# Patient Record
Sex: MALE | Race: WHITE | Employment: UNEMPLOYED | URBAN - METROPOLITAN AREA
[De-identification: names, ages, dates, MRNs, and addresses within clinical notes are randomized per-mention and may not be internally consistent; named-entity substitution may affect disease eponyms.]

---

## 2017-02-07 ENCOUNTER — TELEPHONE (OUTPATIENT)
Dept: PAIN MANAGEMENT | Age: 32
End: 2017-02-07

## 2017-02-08 ENCOUNTER — TELEPHONE (OUTPATIENT)
Dept: PAIN MANAGEMENT | Age: 32
End: 2017-02-08

## 2017-02-10 ENCOUNTER — TELEPHONE (OUTPATIENT)
Dept: PAIN MANAGEMENT | Age: 32
End: 2017-02-10

## 2017-02-10 RX ORDER — METHADONE HYDROCHLORIDE 5 MG/1
5 TABLET ORAL EVERY 12 HOURS PRN
Qty: 30 TABLET | Refills: 0 | Status: SHIPPED | OUTPATIENT
Start: 2017-02-10 | End: 2017-02-14

## 2017-02-14 ENCOUNTER — HOSPITAL ENCOUNTER (OUTPATIENT)
Dept: PAIN MANAGEMENT | Age: 32
Discharge: HOME | End: 2017-02-14
Attending: PAIN MEDICINE | Admitting: PAIN MEDICINE
Payer: MEDICAID

## 2017-02-14 VITALS
HEART RATE: 104 BPM | SYSTOLIC BLOOD PRESSURE: 140 MMHG | RESPIRATION RATE: 16 BRPM | HEIGHT: 75 IN | OXYGEN SATURATION: 95 % | TEMPERATURE: 98 F | DIASTOLIC BLOOD PRESSURE: 87 MMHG | BODY MASS INDEX: 36.06 KG/M2 | WEIGHT: 290 LBS

## 2017-02-14 DIAGNOSIS — G43.919 INTRACTABLE MIGRAINE WITHOUT STATUS MIGRAINOSUS, UNSPECIFIED MIGRAINE TYPE: Primary | ICD-10-CM

## 2017-02-14 DIAGNOSIS — M54.2 CERVICALGIA: ICD-10-CM

## 2017-02-14 DIAGNOSIS — Q98.4 KLINEFELTER SYNDROME: ICD-10-CM

## 2017-02-14 DIAGNOSIS — Z79.891 ENCOUNTER FOR LONG-TERM OPIATE ANALGESIC USE: ICD-10-CM

## 2017-02-14 DIAGNOSIS — M47.812 CERVICAL SPONDYLOSIS WITHOUT MYELOPATHY: ICD-10-CM

## 2017-02-14 DIAGNOSIS — R51.9 BILATERAL HEADACHES: ICD-10-CM

## 2017-02-14 PROCEDURE — 99214 OFFICE O/P EST MOD 30 MIN: CPT | Performed by: PAIN MEDICINE

## 2017-02-14 PROCEDURE — 99213 OFFICE O/P EST LOW 20 MIN: CPT

## 2017-02-14 ASSESSMENT — PAIN DESCRIPTION - LOCATION: LOCATION: HEAD

## 2017-02-14 ASSESSMENT — ENCOUNTER SYMPTOMS
EYE WATERING: 0
NAUSEA: 0
GASTROINTESTINAL NEGATIVE: 1
BACK PAIN: 0
BLURRED VISION: 0
DOUBLE VISION: 1
EYE PAIN: 0
RESPIRATORY NEGATIVE: 1
VOMITING: 0

## 2017-02-14 ASSESSMENT — PAIN DESCRIPTION - PAIN TYPE: TYPE: CHRONIC PAIN

## 2017-02-14 ASSESSMENT — PAIN DESCRIPTION - ONSET: ONSET: ON-GOING

## 2017-02-14 ASSESSMENT — PAIN DESCRIPTION - ORIENTATION: ORIENTATION: RIGHT;LEFT

## 2017-02-14 ASSESSMENT — PAIN SCALES - GENERAL: PAINLEVEL_OUTOF10: 7

## 2017-02-14 ASSESSMENT — PAIN DESCRIPTION - PROGRESSION: CLINICAL_PROGRESSION: NOT CHANGED

## 2017-02-14 ASSESSMENT — PAIN DESCRIPTION - FREQUENCY: FREQUENCY: CONTINUOUS

## 2017-03-20 ENCOUNTER — HOSPITAL ENCOUNTER (OUTPATIENT)
Dept: PAIN MANAGEMENT | Age: 32
Discharge: HOME | End: 2017-03-20
Payer: MEDICAID

## 2017-03-20 VITALS
HEART RATE: 93 BPM | RESPIRATION RATE: 16 BRPM | SYSTOLIC BLOOD PRESSURE: 121 MMHG | DIASTOLIC BLOOD PRESSURE: 81 MMHG | BODY MASS INDEX: 36.06 KG/M2 | WEIGHT: 290 LBS | TEMPERATURE: 98 F | OXYGEN SATURATION: 95 % | HEIGHT: 75 IN

## 2017-03-20 DIAGNOSIS — R51.9 BILATERAL HEADACHES: ICD-10-CM

## 2017-03-20 DIAGNOSIS — Z79.891 ENCOUNTER FOR LONG-TERM OPIATE ANALGESIC USE: ICD-10-CM

## 2017-03-20 DIAGNOSIS — G43.919 INTRACTABLE MIGRAINE WITHOUT STATUS MIGRAINOSUS, UNSPECIFIED MIGRAINE TYPE: Primary | ICD-10-CM

## 2017-03-20 DIAGNOSIS — M47.812 CERVICAL SPONDYLOSIS WITHOUT MYELOPATHY: ICD-10-CM

## 2017-03-20 DIAGNOSIS — M54.2 CERVICALGIA: ICD-10-CM

## 2017-03-20 DIAGNOSIS — Q98.4 KLINEFELTER SYNDROME: ICD-10-CM

## 2017-03-20 DIAGNOSIS — R51.9 GENERALIZED HEADACHES: ICD-10-CM

## 2017-03-20 PROCEDURE — 99214 OFFICE O/P EST MOD 30 MIN: CPT | Performed by: PAIN MEDICINE

## 2017-03-20 PROCEDURE — 99213 OFFICE O/P EST LOW 20 MIN: CPT

## 2017-03-20 RX ORDER — BUPRENORPHINE 5 UG/H
1 PATCH TRANSDERMAL WEEKLY
Qty: 4 PATCH | Refills: 0 | Status: SHIPPED | OUTPATIENT
Start: 2017-03-20 | End: 2017-05-12 | Stop reason: SDUPTHER

## 2017-03-20 ASSESSMENT — ENCOUNTER SYMPTOMS
EYES NEGATIVE: 1
EYE WATERING: 0
BACK PAIN: 0
SHORTNESS OF BREATH: 0
VOMITING: 0
EYE PAIN: 0
BLURRED VISION: 0
ABDOMINAL PAIN: 0
GASTROINTESTINAL NEGATIVE: 1
COUGH: 0
RESPIRATORY NEGATIVE: 1
CONSTIPATION: 0
NAUSEA: 0

## 2017-03-20 ASSESSMENT — PAIN DESCRIPTION - ORIENTATION: ORIENTATION: RIGHT;LEFT

## 2017-03-20 ASSESSMENT — PAIN SCALES - GENERAL: PAINLEVEL_OUTOF10: 7

## 2017-03-20 ASSESSMENT — PAIN DESCRIPTION - PAIN TYPE: TYPE: CHRONIC PAIN

## 2017-03-20 ASSESSMENT — PAIN DESCRIPTION - PROGRESSION: CLINICAL_PROGRESSION: NOT CHANGED

## 2017-03-20 ASSESSMENT — PAIN DESCRIPTION - ONSET: ONSET: ON-GOING

## 2017-03-20 ASSESSMENT — PAIN DESCRIPTION - FREQUENCY: FREQUENCY: CONTINUOUS

## 2017-03-20 ASSESSMENT — PAIN DESCRIPTION - LOCATION: LOCATION: HEAD

## 2017-03-28 ENCOUNTER — TELEPHONE (OUTPATIENT)
Dept: PAIN MANAGEMENT | Age: 32
End: 2017-03-28

## 2017-03-29 ENCOUNTER — TELEPHONE (OUTPATIENT)
Dept: PAIN MANAGEMENT | Age: 32
End: 2017-03-29

## 2017-03-31 ENCOUNTER — TELEPHONE (OUTPATIENT)
Dept: PAIN MANAGEMENT | Age: 32
End: 2017-03-31

## 2017-05-12 ENCOUNTER — HOSPITAL ENCOUNTER (OUTPATIENT)
Dept: PAIN MANAGEMENT | Age: 32
Discharge: HOME | End: 2017-05-12
Payer: MEDICAID

## 2017-05-12 VITALS
RESPIRATION RATE: 16 BRPM | BODY MASS INDEX: 36.06 KG/M2 | HEART RATE: 108 BPM | OXYGEN SATURATION: 97 % | SYSTOLIC BLOOD PRESSURE: 116 MMHG | WEIGHT: 290 LBS | DIASTOLIC BLOOD PRESSURE: 85 MMHG | TEMPERATURE: 98 F | HEIGHT: 75 IN

## 2017-05-12 DIAGNOSIS — M54.2 CERVICALGIA: ICD-10-CM

## 2017-05-12 DIAGNOSIS — Z79.891 ENCOUNTER FOR LONG-TERM OPIATE ANALGESIC USE: ICD-10-CM

## 2017-05-12 DIAGNOSIS — R51.9 BILATERAL HEADACHES: ICD-10-CM

## 2017-05-12 DIAGNOSIS — G43.919 INTRACTABLE MIGRAINE WITHOUT STATUS MIGRAINOSUS, UNSPECIFIED MIGRAINE TYPE: Primary | ICD-10-CM

## 2017-05-12 DIAGNOSIS — R51.9 GENERALIZED HEADACHES: ICD-10-CM

## 2017-05-12 DIAGNOSIS — Q98.4 KLINEFELTER SYNDROME: ICD-10-CM

## 2017-05-12 DIAGNOSIS — M47.812 CERVICAL SPONDYLOSIS WITHOUT MYELOPATHY: ICD-10-CM

## 2017-05-12 PROCEDURE — 99214 OFFICE O/P EST MOD 30 MIN: CPT | Performed by: PAIN MEDICINE

## 2017-05-12 PROCEDURE — G0463 HOSPITAL OUTPT CLINIC VISIT: HCPCS

## 2017-05-12 PROCEDURE — 99213 OFFICE O/P EST LOW 20 MIN: CPT

## 2017-05-12 RX ORDER — BUPRENORPHINE 5 UG/H
1 PATCH TRANSDERMAL WEEKLY
Qty: 4 PATCH | Refills: 0 | Status: SHIPPED | OUTPATIENT
Start: 2017-06-13 | End: 2017-08-08 | Stop reason: SDUPTHER

## 2017-05-12 ASSESSMENT — PAIN DESCRIPTION - FREQUENCY: FREQUENCY: CONTINUOUS

## 2017-05-12 ASSESSMENT — ENCOUNTER SYMPTOMS
EYE PAIN: 0
BACK PAIN: 0
PHOTOPHOBIA: 1
EYE WATERING: 0
BLURRED VISION: 0
NAUSEA: 0
GASTROINTESTINAL NEGATIVE: 1
HEARTBURN: 0
VOMITING: 0
SHORTNESS OF BREATH: 1
CONSTIPATION: 0
COUGH: 1
ABDOMINAL PAIN: 0

## 2017-05-12 ASSESSMENT — PAIN SCALES - GENERAL: PAINLEVEL_OUTOF10: 5

## 2017-05-12 ASSESSMENT — PAIN DESCRIPTION - LOCATION: LOCATION: HEAD

## 2017-05-12 ASSESSMENT — PAIN DESCRIPTION - PROGRESSION: CLINICAL_PROGRESSION: NOT CHANGED

## 2017-05-12 ASSESSMENT — PAIN DESCRIPTION - DESCRIPTORS: DESCRIPTORS: HEADACHE

## 2017-05-12 ASSESSMENT — PAIN DESCRIPTION - PAIN TYPE: TYPE: CHRONIC PAIN

## 2017-05-12 ASSESSMENT — PAIN DESCRIPTION - ONSET: ONSET: ON-GOING

## 2017-05-12 ASSESSMENT — PAIN DESCRIPTION - ORIENTATION: ORIENTATION: RIGHT;LEFT

## 2017-05-23 ENCOUNTER — HOSPITAL ENCOUNTER (OUTPATIENT)
Dept: PAIN MANAGEMENT | Age: 32
Discharge: HOME | End: 2017-05-23
Payer: MEDICAID

## 2017-05-23 DIAGNOSIS — G43.919 INTRACTABLE MIGRAINE WITHOUT STATUS MIGRAINOSUS, UNSPECIFIED MIGRAINE TYPE: Primary | ICD-10-CM

## 2017-05-23 DIAGNOSIS — R51.9 BILATERAL HEADACHES: ICD-10-CM

## 2017-06-07 ENCOUNTER — HOSPITAL ENCOUNTER (OUTPATIENT)
Dept: PAIN MANAGEMENT | Age: 32
Discharge: HOME | End: 2017-06-07
Payer: MEDICAID

## 2017-06-07 DIAGNOSIS — R51.9 BILATERAL HEADACHES: ICD-10-CM

## 2017-06-07 DIAGNOSIS — R51.9 GENERALIZED HEADACHES: ICD-10-CM

## 2017-06-07 DIAGNOSIS — M54.2 CERVICALGIA: ICD-10-CM

## 2017-06-07 DIAGNOSIS — Z79.891 ENCOUNTER FOR LONG-TERM OPIATE ANALGESIC USE: ICD-10-CM

## 2017-06-07 DIAGNOSIS — G43.919 INTRACTABLE MIGRAINE WITHOUT STATUS MIGRAINOSUS, UNSPECIFIED MIGRAINE TYPE: Primary | ICD-10-CM

## 2017-06-07 PROCEDURE — G0463 HOSPITAL OUTPT CLINIC VISIT: HCPCS

## 2017-06-07 PROCEDURE — 99213 OFFICE O/P EST LOW 20 MIN: CPT

## 2017-06-07 PROCEDURE — 99213 OFFICE O/P EST LOW 20 MIN: CPT | Performed by: NURSE PRACTITIONER

## 2017-07-11 ENCOUNTER — HOSPITAL ENCOUNTER (OUTPATIENT)
Dept: PAIN MANAGEMENT | Age: 32
Discharge: HOME | End: 2017-07-11
Payer: MEDICAID

## 2017-07-11 VITALS
TEMPERATURE: 98.2 F | RESPIRATION RATE: 16 BRPM | WEIGHT: 290 LBS | SYSTOLIC BLOOD PRESSURE: 145 MMHG | BODY MASS INDEX: 36.06 KG/M2 | DIASTOLIC BLOOD PRESSURE: 94 MMHG | OXYGEN SATURATION: 96 % | HEART RATE: 102 BPM | HEIGHT: 75 IN

## 2017-07-11 DIAGNOSIS — M47.812 CERVICAL SPONDYLOSIS WITHOUT MYELOPATHY: Primary | ICD-10-CM

## 2017-07-11 DIAGNOSIS — Z79.891 ENCOUNTER FOR LONG-TERM OPIATE ANALGESIC USE: ICD-10-CM

## 2017-07-11 DIAGNOSIS — M54.2 CERVICALGIA: ICD-10-CM

## 2017-07-11 DIAGNOSIS — R51.9 GENERALIZED HEADACHES: ICD-10-CM

## 2017-07-11 DIAGNOSIS — R51.9 BILATERAL HEADACHES: ICD-10-CM

## 2017-07-11 DIAGNOSIS — G43.919 INTRACTABLE MIGRAINE WITHOUT STATUS MIGRAINOSUS, UNSPECIFIED MIGRAINE TYPE: ICD-10-CM

## 2017-07-11 PROCEDURE — 99213 OFFICE O/P EST LOW 20 MIN: CPT | Performed by: NURSE PRACTITIONER

## 2017-07-11 PROCEDURE — 99213 OFFICE O/P EST LOW 20 MIN: CPT

## 2017-07-11 ASSESSMENT — ENCOUNTER SYMPTOMS
GASTROINTESTINAL NEGATIVE: 1
EYES NEGATIVE: 1
SHORTNESS OF BREATH: 1

## 2017-08-08 ENCOUNTER — HOSPITAL ENCOUNTER (OUTPATIENT)
Dept: PAIN MANAGEMENT | Age: 32
Discharge: HOME | End: 2017-08-08
Payer: MEDICAID

## 2017-08-08 VITALS
HEIGHT: 75 IN | BODY MASS INDEX: 34.82 KG/M2 | SYSTOLIC BLOOD PRESSURE: 139 MMHG | HEART RATE: 94 BPM | WEIGHT: 280 LBS | RESPIRATION RATE: 16 BRPM | DIASTOLIC BLOOD PRESSURE: 84 MMHG

## 2017-08-08 DIAGNOSIS — Z79.891 ENCOUNTER FOR LONG-TERM OPIATE ANALGESIC USE: Primary | ICD-10-CM

## 2017-08-08 DIAGNOSIS — R51.9 GENERALIZED HEADACHES: ICD-10-CM

## 2017-08-08 DIAGNOSIS — G43.519 INTRACTABLE PERSISTENT MIGRAINE AURA WITHOUT CEREBRAL INFARCTION AND WITHOUT STATUS MIGRAINOSUS: ICD-10-CM

## 2017-08-08 PROCEDURE — 99213 OFFICE O/P EST LOW 20 MIN: CPT

## 2017-08-08 PROCEDURE — 80307 DRUG TEST PRSMV CHEM ANLYZR: CPT

## 2017-08-08 PROCEDURE — 99214 OFFICE O/P EST MOD 30 MIN: CPT | Performed by: NURSE PRACTITIONER

## 2017-08-08 RX ORDER — BUPRENORPHINE 5 UG/H
1 PATCH TRANSDERMAL WEEKLY
Qty: 4 PATCH | Refills: 0 | Status: SHIPPED | OUTPATIENT
Start: 2017-08-11 | End: 2017-10-10 | Stop reason: SDUPTHER

## 2017-08-08 ASSESSMENT — PAIN SCALES - GENERAL: PAINLEVEL_OUTOF10: 6

## 2017-08-08 ASSESSMENT — ENCOUNTER SYMPTOMS
BLURRED VISION: 0
SHORTNESS OF BREATH: 0
COUGH: 0
PHOTOPHOBIA: 1
CONSTIPATION: 0

## 2017-08-19 LAB
6-ACETYLMORPHINE, UR: NOT DETECTED
7-AMINOCLONAZEPAM, URINE: NOT DETECTED
ALPHA-OH-ALPRAZ, URINE: NOT DETECTED
ALPRAZOLAM, URINE: NOT DETECTED
AMPHETAMINES, URINE: NOT DETECTED
BARBITURATES, URINE: NOT DETECTED
BENZOYLECGONINE, UR: NOT DETECTED
BUPRENORPHINE URINE: NOT DETECTED
CARISOPRODOL, UR: NOT DETECTED
CLONAZEPAM, URINE: NOT DETECTED
CODEINE, URINE: NOT DETECTED
CREATININE URINE: 190.1 MG/DL (ref 20–400)
DIAZEPAM, URINE: NOT DETECTED
DRUGS EXPECTED, UR: NORMAL
EER HI RES INTERP UR: NORMAL
ETHYL GLUCURONIDE UR: NOT DETECTED
FENTANYL URINE: NOT DETECTED
HYDROCODONE, URINE: NOT DETECTED
HYDROMORPHONE, URINE: NOT DETECTED
LORAZEPAM, URINE: NOT DETECTED
MARIJUANA METAB, UR: NOT DETECTED
MDA, UR: NOT DETECTED
MDEA, EVE, UR: NOT DETECTED
MDMA URINE: NOT DETECTED
MEPERIDINE METAB, UR: NOT DETECTED
METHADONE, URINE: NOT DETECTED
METHAMPHETAMINE, URINE: NOT DETECTED
METHYLPHENIDATE: NOT DETECTED
MIDAZOLAM, URINE: NOT DETECTED
MORPHINE URINE: NOT DETECTED
NORBUPRENORPHINE, URINE: NOT DETECTED
NORDIAZEPAM, URINE: NOT DETECTED
NORFENTANYL, URINE: NOT DETECTED
NORHYDROCODONE, URINE: NOT DETECTED
NOROXYCODONE, URINE: NOT DETECTED
NOROXYMORPHONE, URINE: NOT DETECTED
OXAZEPAM, URINE: NOT DETECTED
OXYCODONE URINE: NOT DETECTED
OXYMORPHONE, URINE: NOT DETECTED
PAIN MANAGEMENT DRUG PANEL INTERP, URINE: NORMAL
PAIN MGT DRUG PANEL, HI RES, UR: NORMAL
PCP,URINE: NOT DETECTED
PHENTERMINE, UR: NOT DETECTED
PROPOXYPHENE, URINE: NOT DETECTED
TAPENTADOL, URINE: PRESENT
TAPENTADOL-O-SULFATE, URINE: PRESENT
TEMAZEPAM, URINE: NOT DETECTED
TRAMADOL, URINE: NOT DETECTED
ZOLPIDEM, URINE: NOT DETECTED

## 2017-09-11 ENCOUNTER — TELEPHONE (OUTPATIENT)
Dept: PAIN MANAGEMENT | Age: 32
End: 2017-09-11

## 2017-09-12 ENCOUNTER — HOSPITAL ENCOUNTER (OUTPATIENT)
Dept: PAIN MANAGEMENT | Age: 32
Discharge: HOME | End: 2017-09-12
Payer: MEDICAID

## 2017-09-12 VITALS — SYSTOLIC BLOOD PRESSURE: 140 MMHG | RESPIRATION RATE: 16 BRPM | DIASTOLIC BLOOD PRESSURE: 85 MMHG | HEART RATE: 95 BPM

## 2017-09-12 DIAGNOSIS — Z79.891 ENCOUNTER FOR LONG-TERM OPIATE ANALGESIC USE: ICD-10-CM

## 2017-09-12 DIAGNOSIS — G43.519 INTRACTABLE PERSISTENT MIGRAINE AURA WITHOUT CEREBRAL INFARCTION AND WITHOUT STATUS MIGRAINOSUS: Primary | ICD-10-CM

## 2017-09-12 PROCEDURE — 99213 OFFICE O/P EST LOW 20 MIN: CPT | Performed by: NURSE PRACTITIONER

## 2017-09-12 PROCEDURE — 99213 OFFICE O/P EST LOW 20 MIN: CPT

## 2017-09-12 ASSESSMENT — ENCOUNTER SYMPTOMS
NAUSEA: 0
SHORTNESS OF BREATH: 0
COUGH: 0
PHOTOPHOBIA: 1
CONSTIPATION: 0
BACK PAIN: 1
BLURRED VISION: 0

## 2017-10-09 ENCOUNTER — HOSPITAL ENCOUNTER (OUTPATIENT)
Dept: PAIN MANAGEMENT | Age: 32
Discharge: HOME | End: 2017-10-09
Payer: MEDICAID

## 2017-10-09 VITALS
OXYGEN SATURATION: 98 % | HEIGHT: 75 IN | TEMPERATURE: 98.4 F | DIASTOLIC BLOOD PRESSURE: 77 MMHG | HEART RATE: 96 BPM | WEIGHT: 280 LBS | BODY MASS INDEX: 34.82 KG/M2 | SYSTOLIC BLOOD PRESSURE: 137 MMHG | RESPIRATION RATE: 18 BRPM

## 2017-10-09 DIAGNOSIS — Z79.891 ENCOUNTER FOR LONG-TERM OPIATE ANALGESIC USE: ICD-10-CM

## 2017-10-09 DIAGNOSIS — R51.9 GENERALIZED HEADACHES: ICD-10-CM

## 2017-10-09 DIAGNOSIS — G43.919 INTRACTABLE MIGRAINE WITHOUT STATUS MIGRAINOSUS, UNSPECIFIED MIGRAINE TYPE: ICD-10-CM

## 2017-10-09 DIAGNOSIS — Q98.4 KLINEFELTER SYNDROME: ICD-10-CM

## 2017-10-09 DIAGNOSIS — R51.9 BILATERAL HEADACHES: ICD-10-CM

## 2017-10-09 DIAGNOSIS — G43.519 INTRACTABLE PERSISTENT MIGRAINE AURA WITHOUT CEREBRAL INFARCTION AND WITHOUT STATUS MIGRAINOSUS: Primary | ICD-10-CM

## 2017-10-09 DIAGNOSIS — M54.2 CERVICALGIA: ICD-10-CM

## 2017-10-09 DIAGNOSIS — M47.812 CERVICAL SPONDYLOSIS WITHOUT MYELOPATHY: ICD-10-CM

## 2017-10-09 PROCEDURE — 99213 OFFICE O/P EST LOW 20 MIN: CPT

## 2017-10-09 PROCEDURE — 99214 OFFICE O/P EST MOD 30 MIN: CPT | Performed by: PAIN MEDICINE

## 2017-10-09 RX ORDER — BUPRENORPHINE 10 UG/H
PATCH, EXTENDED RELEASE TRANSDERMAL
Refills: 0 | COMMUNITY
Start: 2017-08-11 | End: 2017-10-09 | Stop reason: SDUPTHER

## 2017-10-09 RX ORDER — TESTOSTERONE CYPIONATE 200 MG/ML
INJECTION INTRAMUSCULAR
Refills: 5 | COMMUNITY
Start: 2017-09-21 | End: 2018-11-28 | Stop reason: ALTCHOICE

## 2017-10-09 RX ORDER — BUPRENORPHINE 10 UG/H
PATCH, EXTENDED RELEASE TRANSDERMAL
Qty: 1 PATCH | Refills: 0 | Status: SHIPPED | OUTPATIENT
Start: 2017-10-09 | End: 2017-10-09 | Stop reason: SDUPTHER

## 2017-10-09 RX ORDER — BUPRENORPHINE 10 UG/H
PATCH, EXTENDED RELEASE TRANSDERMAL
Qty: 4 PATCH | Refills: 0 | Status: SHIPPED | OUTPATIENT
Start: 2017-10-09 | End: 2017-10-10 | Stop reason: CLARIF

## 2017-10-09 RX ORDER — SERTRALINE HYDROCHLORIDE 100 MG/1
TABLET, FILM COATED ORAL
Refills: 5 | COMMUNITY
Start: 2017-09-12 | End: 2019-09-13

## 2017-10-09 ASSESSMENT — PAIN SCALES - GENERAL: PAINLEVEL_OUTOF10: 6

## 2017-10-09 ASSESSMENT — PAIN DESCRIPTION - ONSET: ONSET: ON-GOING

## 2017-10-09 ASSESSMENT — PAIN DESCRIPTION - LOCATION: LOCATION: HEAD

## 2017-10-09 ASSESSMENT — PAIN DESCRIPTION - DESCRIPTORS: DESCRIPTORS: HEADACHE

## 2017-10-09 ASSESSMENT — ENCOUNTER SYMPTOMS
COUGH: 1
EYE PAIN: 0
SHORTNESS OF BREATH: 1
PHOTOPHOBIA: 1
VOMITING: 0
BACK PAIN: 0
BLURRED VISION: 0
NAUSEA: 0
EYE WATERING: 0
ABDOMINAL PAIN: 0

## 2017-10-09 ASSESSMENT — PAIN DESCRIPTION - PAIN TYPE: TYPE: CHRONIC PAIN

## 2017-10-09 ASSESSMENT — PAIN DESCRIPTION - ORIENTATION: ORIENTATION: RIGHT

## 2017-10-09 ASSESSMENT — PAIN DESCRIPTION - PROGRESSION: CLINICAL_PROGRESSION: NOT CHANGED

## 2017-10-09 ASSESSMENT — PAIN DESCRIPTION - FREQUENCY: FREQUENCY: CONTINUOUS

## 2017-10-09 NOTE — PROGRESS NOTES
tried triptans, oral narcotics, acetaminophen, antidepressants, NSAIDs, darkened room, cold packs, Excedrin and beta blockers for the symptoms. The treatment provided mild relief. His past medical history is significant for migraine headaches and migraines in the family. There is no history of recent head traumas. Patient relates current medications are helping the pain. Patient reports taking pain medications as prescribed, denies obtaining medications from different sources and denies use of illegal drugs. Patient denies side effects from medications like nausea, vomiting, constipation or drowsiness. Patient reports current activities of daily living ar possible due to medications and would like to continue them. OARRS compliant? yes  Concern for prescription abuse?no    Current Pain Assessment  Pain Assessment  Pain Assessment: 0-10  Pain Level: 6  Pain Type: Chronic pain  Pain Location: Head  Pain Orientation: Right  Pain Radiating Towards: none  Pain Descriptors: Headache  Pain Frequency: Continuous  Pain Onset: On-going  Clinical Progression: Not changed  Effect of Pain on Daily Activities: unable to get out of bed most days till pain from headache subsided  Patient's Stated Pain Goal: 2 (decrease pain and increase activty)  Pain Intervention(s): Medication (see eMar), Cold applied                    ADVERSE MEDICATION EFFECTS:   Constipation: no  Bowel Regimen: no  Diet: common adult  Appetite:  ok  Sedation:  Yes, Butrans does    Urinary Retention: no     FOCUSED PAIN SCALE:  Highest : 10  Lowest :2  Average: Range-depending on headache  When and What  was your last procedure:  Did have injections in the past by a different pain clinic   Was your procedure effective:  did not help     ACTIVITY/SOCIAL/EMOTIONAL:  Sleep Pattern: 8 hours per night.  generally restful sleep  Energy Level:  Tired/Fatigued  Currently attending Physical Therapy:  No  Home Exercises: tries to walk  Mobility: no current mobility problem   Currently seeing a Psychiatrist or Psychologist:  No  Emotional Issues: denies                      Mood: depressed , on zoloft, denies suicidal thoughts     ABERRANT BEHAVIORS SINCE LAST VISIT:  Have you ever been treated in another Pain Clinic no  Refills for prescriptions appropriate: yes  Lost rx/pills: yes, keeps them safe now  Taking more medication than prescribed:  no  Are you receiving PAIN medications from  other doctors: no  Last Urine/Serum Drug Screen : 8/2017DRUGS EXPECTED:   BUTRANS (BUPRENORPHINE) [7/27/17]   NUCYNTA (TAPENTADOL) [8/8/17 AM]   ________________________________________________________________   CONSISTENT with medications and timing provided:   NUCYNTA (TAPENTADOL) : based on tapentadol, tapentadol-o-sulfate   BUTRANS (BUPRENORPHINE) : based on the absence of buprenorphine   and metabolites   Was Serum/UDS as anticipated? yes  Brought pill bottles in :yes, no butrans since he did not get any last month  Was Pill count appropriate? :yes   Are currently pregnant? not applicable  Recent ER visits: No           Past Medical History      Diagnosis Date    Asthma     Depression     Headache     Klinefelter syndrome        Surgical History  Past Surgical History:   Procedure Laterality Date    UPPP UVULOPALATOPHARYGOPLASTY         Medications  Current Outpatient Prescriptions   Medication Sig Dispense Refill    [START ON 10/12/2017] tapentadol (NUCYNTA) 50 MG TABS Take 1 tablet by mouth every 8 hours .  Earliest Fill Date: 10/12/17 90 tablet 0    BUTRANS 10 MCG/HR PTWK APPLY ONE PATCH TO A HAIRLESS AREA EVERY WEEK 1 patch 0    sertraline (ZOLOFT) 100 MG tablet TAKE ONE TABLET BY MOUTH ONCE A DAY EVERY MORNING  5    testosterone cypionate (DEPOTESTOTERONE CYPIONATE) 200 MG/ML injection INJECT 1CC  INTRA-MUSCULARLY EVERY 14 DAYS  5    Albuterol (VENTOLIN IN) Inhale into the lungs      baclofen (LIORESAL) 10 MG tablet Take 10 mg by mouth 3 times daily      Constitutional: He is oriented to person, place, and time. He appears well-developed and well-nourished. Has sunglasses on due to photophobia   HENT:   Head: Normocephalic and atraumatic. Palpation did not reveal any tenderness over the temporal arteries are over the frontal sinuses. There   Eyes: Conjunctivae and EOM are normal. Pupils are equal, round, and reactive to light. Neck: Normal range of motion. Neck supple. No JVD present. No tracheal deviation present. No thyromegaly present. Cardiovascular: Normal rate and regular rhythm. Pulmonary/Chest: Breath sounds normal. No apnea. No respiratory distress. Abdominal: He exhibits no distension. Musculoskeletal: Normal range of motion. He exhibits no edema or tenderness. Neurological: He is alert and oriented to person, place, and time. He has normal strength and normal reflexes. He displays no atrophy and no tremor. No cranial nerve deficit. He exhibits normal muscle tone. He displays a negative Romberg sign. Coordination normal.   Reflex Scores:       Tricep reflexes are 2+ on the right side and 2+ on the left side. Bicep reflexes are 2+ on the right side and 2+ on the left side. Skin: Skin is warm, dry and intact. No rash noted. No erythema. Psychiatric: He has a normal mood and affect. His speech is normal and behavior is normal. Judgment and thought content normal. Cognition and memory are normal.   Nursing note and vitals reviewed. Back Exam     Tenderness   The patient is experiencing no tenderness. Range of Motion   Extension: normal   Flexion: normal   Lateral Bend Right: normal   Lateral Bend Left: normal   Rotation Right: normal   Rotation Left: normal     Comments:  Palpation did not reveal tenderness over the paraspinal region in the cervical area. There is a slight decrease in the cervical lordosis. Moments of the neck are painless and normal in range.             DATA  Labs:    8/19/2017 12:34 AM - Bull, [place] Incoming Lab Results From [facility]     Component Results     Component Value Ref Range & Units Status Collected Lab   Pain Management Drug Panel Interp, Urine Consistent   Final 08/08/2017  1:30 PM [facility]   (NOTE)   ________________________________________________________________   DRUGS EXPECTED:   BUTRANS (BUPRENORPHINE) [7/27/17]   NUCYNTA (TAPENTADOL) [8/8/17 AM]   ________________________________________________________________   CONSISTENT with medications and timing provided:   NUCYNTA (TAPENTADOL) : based on tapentadol, tapentadol-o-sulfate   BUTRANS (BUPRENORPHINE) : based on the absence of buprenorphine   and metabolites   ________________________________________________________________   INTERPRETIVE INFORMATION:Pain Mgt Abel, High Res/EMIT, Ur, Interp   Interpretation depends on accuracy and completeness of patient        Imaging:  Radiology Images and Reports reviewed where indicated and necessary  X-ray of the cervical spine:  Findings:  There is normal alignment without significant alignment shift upon flexion.  There is no acute fracture or subluxation.  The dens is intact. Oblique views demonstrate the neural foramen to be widely patent bilaterally       The vertebral bodies demonstrate normal height.  The intervertebral disc spaces are well maintained.  There is no prevertebral soft tissue swelling.       Impression: Unremarkable cervical spine radiographs.       Final report electronically signed by Mann Coker M.D. on 9/21/2016       ASSESSMENT    Rossy Cardona is a 36 y.o. male with     1. Intractable persistent migraine aura without cerebral infarction and without status migrainosus    2. Encounter for long-term opiate analgesic use    3. Generalized headaches    4. Intractable migraine without status migrainosus, unspecified migraine type    5. Cervicalgia    6. Bilateral headaches    7. Cervical spondylosis without myelopathy    8.  Klinefelter syndrome          Patient Active Problem List Diagnosis    Intractable persistent migraine aura without cerebral infarction and without status migrainosus    Bilateral headaches    Encounter for long-term opiate analgesic use    Cervicalgia    Cervical spondylosis without myelopathy       PLAN    We will continue current pain medications  Current medications are being tolerated without any Adverse side effects. Orders Placed This Encounter   Medications    tapentadol (NUCYNTA) 50 MG TABS     Sig: Take 1 tablet by mouth every 8 hours . Earliest Fill Date: 10/12/17     Dispense:  90 tablet     Refill:  0    BUTRANS 10 MCG/HR PTWK     Sig: APPLY ONE PATCH TO A HAIRLESS AREA EVERY WEEK     Dispense:  1 patch     Refill:  0     Urine drug screens have been appropriate. No aberrant activity noted. Analgesia is achieved. Activities of daily living are possible because of medications. Safe use of medications explained to patient. Counselling/Preventive measures for pain  Control:    [x]  Spine strengthening exercises are discussed with patient in detail. [x] Ill effects of being on chronic pain medications such as sleep disturbances, hormonal changes, withdrawal symptoms,  chronic opioid dependence and tolerance were discussed with patient. I had asked the patient to minimize medication use and utilize pain medications only for uncontrolled rest pain or pain with exertional activities. I advised patient not to self escalate pain medications without consulting with us. At each of patient's future visits we will try to taper pain medications, while adjusting the adjunct medications, and re-evaluating for Physical Therapy to improve spinal and joint strength. We will continue to have discussions to decrease pain medications as tolerated. We discussed the same at today's visit and have not been to implement it, as the patient's pain is not under control with current medications.      Decision Making Process : Patient's health history and

## 2017-10-10 ENCOUNTER — TELEPHONE (OUTPATIENT)
Dept: PAIN MANAGEMENT | Age: 32
End: 2017-10-10

## 2017-10-10 RX ORDER — BUPRENORPHINE 5 UG/H
1 PATCH TRANSDERMAL WEEKLY
Qty: 4 PATCH | Refills: 0 | Status: SHIPPED | OUTPATIENT
Start: 2017-10-10 | End: 2017-11-13 | Stop reason: SDUPTHER

## 2017-11-13 ENCOUNTER — HOSPITAL ENCOUNTER (OUTPATIENT)
Dept: PAIN MANAGEMENT | Age: 32
Discharge: HOME | End: 2017-11-13
Payer: MEDICAID

## 2017-11-13 VITALS
SYSTOLIC BLOOD PRESSURE: 128 MMHG | OXYGEN SATURATION: 96 % | RESPIRATION RATE: 16 BRPM | WEIGHT: 280 LBS | BODY MASS INDEX: 34.82 KG/M2 | TEMPERATURE: 98.3 F | HEART RATE: 74 BPM | DIASTOLIC BLOOD PRESSURE: 67 MMHG | HEIGHT: 75 IN

## 2017-11-13 DIAGNOSIS — Z79.891 ENCOUNTER FOR LONG-TERM OPIATE ANALGESIC USE: ICD-10-CM

## 2017-11-13 DIAGNOSIS — G43.919 INTRACTABLE MIGRAINE WITHOUT STATUS MIGRAINOSUS, UNSPECIFIED MIGRAINE TYPE: ICD-10-CM

## 2017-11-13 DIAGNOSIS — R51.9 BILATERAL HEADACHES: ICD-10-CM

## 2017-11-13 DIAGNOSIS — G43.519 INTRACTABLE PERSISTENT MIGRAINE AURA WITHOUT CEREBRAL INFARCTION AND WITHOUT STATUS MIGRAINOSUS: Primary | ICD-10-CM

## 2017-11-13 DIAGNOSIS — M54.2 CERVICALGIA: ICD-10-CM

## 2017-11-13 DIAGNOSIS — R51.9 GENERALIZED HEADACHES: ICD-10-CM

## 2017-11-13 PROCEDURE — 99213 OFFICE O/P EST LOW 20 MIN: CPT

## 2017-11-13 PROCEDURE — 99213 OFFICE O/P EST LOW 20 MIN: CPT | Performed by: NURSE PRACTITIONER

## 2017-11-13 RX ORDER — BUPRENORPHINE 5 UG/H
1 PATCH TRANSDERMAL WEEKLY
Qty: 4 PATCH | Refills: 0 | Status: SHIPPED | OUTPATIENT
Start: 2017-11-21 | End: 2018-01-15 | Stop reason: SDUPTHER

## 2017-11-13 ASSESSMENT — ENCOUNTER SYMPTOMS
GASTROINTESTINAL NEGATIVE: 1
PHOTOPHOBIA: 1
SHORTNESS OF BREATH: 1

## 2017-11-13 NOTE — PROGRESS NOTES
[facility]  Progress  Note    Patient is here today to review medication contract. Chief Complaint: headache    PMH      HPI: Patient to return to the pain clinic with a chief complaint of headaches. The headache involves entire head patient's headache is constant. He is cystoscopy chronic migraine headaches. The headaches are associated with photophobia as well as phonophobia. Patient apparently has been to headache [place] in [facility]. Patient also gives history of occipital nerve blocks without much improvement in the pain. Patient currently is on Butrans as well as Nucynta for control of headaches. They're helping a certain extent. Patient uses Butrans patch 15 days on and 15 days off in order to decrease the chance of developing tolerance patient reports this regimen is helping to control some of his symptoms and he is more functional. Patient denies any change in his symptoms. No Ed visits. Sleep is good. Activity the same. Pain a \"6\" today, left sides. Migraine    This is a chronic problem. The current episode started more than 1 year ago. The problem occurs constantly. The problem has been unchanged. The pain does not radiate. The pain quality is similar to prior headaches. The quality of the pain is described as throbbing. The pain is at a severity of 6/10. Associated symptoms include photophobia. The symptoms are aggravated by bright light. He has tried darkened room and cold packs for the symptoms. The treatment provided mild relief. His past medical history is significant for migraine headaches.      Possible side effects, risk of tolerance and or dependence and alternative treatments discussed    Obtaining appropriate analgesic effect of treatment   No signs of potential drug abuse or diversion identified    Treatment goals:  Functional status:  Get rid of headaches       Aberrancy  None   Analgesia pain  Pain 6  Adverse  Effects :  none  ADL;s :some walking        Patient denies any new neurological symptoms. No bowel or bladder incontinence, no weakness, and no falling. Controlled Substances Monitoring:     Attestation: The Prescription Monitoring Report for this patient was reviewed today. KASSANDRA Linares)  Documentation: Possible medication side effects, risk of tolerance and/or dependence, and alternative treatments discussed., Obtaining appropriate analgesic effect of treatment., No signs of potential drug abuse or diversion identified., Existing medication contract. (Linh Sweeney, KASSANDRA)  Pill count: appropriate 1 butrans patch left, to apply tomorrow    Review of OARRS does not show any aberrant prescription behavior. Medication is helping the patient stay active. Patient denies any side effects and reports adequate analgesia. No sign of misuse/abuse. Past Medical History:   Diagnosis Date    Asthma     Depression     Headache(784.0)     Klinefelter syndrome        Past Surgical History:   Procedure Laterality Date    UPPP UVULOPALATOPHARYGOPLASTY         Allergies   Allergen Reactions    Food      Bananas, eggs, milk, cherries    Seasonal      Hay fever           Current Outpatient Prescriptions:     tapentadol (NUCYNTA) 50 MG TABS, Take 1 tablet by mouth every 8 hours . , Disp: 90 tablet, Rfl: 0    [START ON 11/21/2017] buprenorphine (BUTRANS) 5 MCG/HR PTWK, Place 1 patch onto the skin once a week ., Disp: 4 patch, Rfl: 0    sertraline (ZOLOFT) 100 MG tablet, TAKE ONE TABLET BY MOUTH ONCE A DAY EVERY MORNING, Disp: , Rfl: 5    testosterone cypionate (DEPOTESTOTERONE CYPIONATE) 200 MG/ML injection, INJECT 1CC  INTRA-MUSCULARLY EVERY 14 DAYS, Disp: , Rfl: 5    anastrozole (ARIMIDEX) 1 MG tablet, Take 1 mg by mouth daily. , Disp: , Rfl:     Albuterol (VENTOLIN IN), Inhale into the lungs, Disp: , Rfl:     baclofen (LIORESAL) 10 MG tablet, Take 10 mg by mouth 3 times daily, Disp: , Rfl:     cyclobenzaprine (FLEXERIL) 10 MG tablet, , Disp: , Rfl:     Family History   Problem Relation Age of Onset    High Blood Pressure Father     Mental Illness Sister     High Blood Pressure Paternal Grandmother     Cancer Paternal Grandmother     Heart Disease Paternal Grandfather     High Blood Pressure Paternal Grandfather     Stroke Paternal Grandfather        Social History     Social History    Marital status: Single     Spouse name: N/A    Number of children: N/A    Years of education: N/A     Occupational History    disability      Social History Main Topics    Smoking status: Never Smoker    Smokeless tobacco: Never Used    Alcohol use Yes      Comment: rare , hard cider or wine    Drug use: No    Sexual activity: No     Other Topics Concern    Not on file     Social History Narrative    No narrative on file       Review of Systems:  Review of Systems   Constitution: Negative. HENT: Negative. Eyes: Positive for photophobia. Cardiovascular: Negative. Respiratory: Positive for shortness of breath. Skin: Negative. Musculoskeletal: Negative. Gastrointestinal: Negative. Neurological: Negative. Psychiatric/Behavioral:        MANAGING         Physical Exam:  /67   Pulse 74   Temp 98.3 °F (36.8 °C) (Oral)   Resp 16   Ht 6' 3\" (1.905 m)   Wt 280 lb (127 kg)   SpO2 96%   BMI 35.00 kg/m²     Physical Exam   Constitutional: He is oriented to person, place, and time and well-developed, well-nourished, and in no distress. obese   HENT:   Head: Normocephalic. No tenderness on palpation   Eyes: EOM are normal. Pupils are equal, round, and reactive to light. Neck: Normal range of motion. Neck supple. Neurological: He is alert and oriented to person, place, and time. He has normal strength. Gait normal.   Reflex Scores:       Tricep reflexes are 2+ on the right side and 2+ on the left side. Bicep reflexes are 2+ on the right side and 2+ on the left side.        Brachioradialis reflexes are 2+ on the clinical use. The results are not intended to be used as the sole means for   clinical diagnosis or patient management decisions. EER Hi Res Interp Ur See Note   Final 08/08/2017  1:30 PM [facility]   (NOTE)   Access [facility] Enhanced Report using either link below:   -Direct access: [email or web address]   -Enter Username, Password: [email or web address]   Username: [email or web address]   Password: [email or web address]   Performed by [place] [id number], [address] [facility] [phone]   www. Aleja Ltitlejohn MD, Lab. Director   Performed at [facility]: MARIA ELENA WALSH [address] [place] [address] [phone]    Testing Performed By       Assessment:  Problem List Items Addressed This Visit     Intractable persistent migraine aura without cerebral infarction and without status migrainosus - Primary    Relevant Medications    tapentadol (NUCYNTA) 50 MG TABS    buprenorphine (BUTRANS) 5 MCG/HR PTWK (Start on 11/21/2017)    Bilateral headaches    Relevant Medications    tapentadol (NUCYNTA) 50 MG TABS    buprenorphine (BUTRANS) 5 MCG/HR PTWK (Start on 11/21/2017)    Encounter for long-term opiate analgesic use    Cervicalgia      Other Visit Diagnoses     Generalized headaches        Relevant Medications    tapentadol (NUCYNTA) 50 MG TABS    buprenorphine (BUTRANS) 5 MCG/HR PTWK (Start on 11/21/2017)    Intractable migraine without status migrainosus, unspecified migraine type        Relevant Medications    tapentadol (NUCYNTA) 50 MG TABS    buprenorphine (BUTRANS) 5 MCG/HR PTWK (Start on 11/21/2017)            Treatment Plan:  DISCUSSION: Treatment options discussed with patient and all questions answered to patient's satisfaction.     Patient\"s mother brought letter from [employer] , patient needs prior authorization for [employer], will give to  and ask her to call pt;s mother  Once approved or if not approved  TREATMENT OPTIONS:   Return in 4 weeks  Continue opioid

## 2017-12-12 ENCOUNTER — HOSPITAL ENCOUNTER (OUTPATIENT)
Dept: PAIN MANAGEMENT | Age: 32
Discharge: HOME | End: 2017-12-12
Payer: MEDICAID

## 2017-12-12 VITALS
OXYGEN SATURATION: 98 % | HEART RATE: 90 BPM | WEIGHT: 280 LBS | RESPIRATION RATE: 16 BRPM | BODY MASS INDEX: 34.82 KG/M2 | HEIGHT: 75 IN | SYSTOLIC BLOOD PRESSURE: 116 MMHG | DIASTOLIC BLOOD PRESSURE: 73 MMHG

## 2017-12-12 DIAGNOSIS — Z79.891 ENCOUNTER FOR LONG-TERM OPIATE ANALGESIC USE: ICD-10-CM

## 2017-12-12 DIAGNOSIS — M54.2 CERVICALGIA: ICD-10-CM

## 2017-12-12 DIAGNOSIS — G43.519 INTRACTABLE PERSISTENT MIGRAINE AURA WITHOUT CEREBRAL INFARCTION AND WITHOUT STATUS MIGRAINOSUS: Primary | ICD-10-CM

## 2017-12-12 DIAGNOSIS — G43.919 INTRACTABLE MIGRAINE WITHOUT STATUS MIGRAINOSUS, UNSPECIFIED MIGRAINE TYPE: ICD-10-CM

## 2017-12-12 DIAGNOSIS — M47.812 CERVICAL SPONDYLOSIS WITHOUT MYELOPATHY: ICD-10-CM

## 2017-12-12 DIAGNOSIS — R51.9 BILATERAL HEADACHES: ICD-10-CM

## 2017-12-12 PROCEDURE — 99213 OFFICE O/P EST LOW 20 MIN: CPT

## 2017-12-12 PROCEDURE — 99213 OFFICE O/P EST LOW 20 MIN: CPT | Performed by: NURSE PRACTITIONER

## 2017-12-12 ASSESSMENT — ENCOUNTER SYMPTOMS
RESPIRATORY NEGATIVE: 1
EYES NEGATIVE: 1
GASTROINTESTINAL NEGATIVE: 1

## 2017-12-12 NOTE — PROGRESS NOTES
KASSANDRA Whitt)  Review of OARRS does not show any aberrant prescription behavior. Medication is helping the patient stay active. Patient denies any side effects and reports adequate analgesia. No sign of misuse/abuse. Past Medical History:   Diagnosis Date    Asthma     Depression     Headache(784.0)     Klinefelter syndrome        Past Surgical History:   Procedure Laterality Date    UPPP UVULOPALATOPHARYGOPLASTY         Allergies   Allergen Reactions    Food      Bananas, eggs, milk, cherries    Seasonal      Hay fever           Current Outpatient Prescriptions:     [START ON 12/13/2017] tapentadol (NUCYNTA) 50 MG TABS, Take 1 tablet by mouth every 8 hours . Earliest Fill Date: 12/13/17, Disp: 90 tablet, Rfl: 0    buprenorphine (BUTRANS) 5 MCG/HR PTWK, Place 1 patch onto the skin once a week ., Disp: 4 patch, Rfl: 0    sertraline (ZOLOFT) 100 MG tablet, TAKE ONE TABLET BY MOUTH ONCE A DAY EVERY MORNING, Disp: , Rfl: 5    testosterone cypionate (DEPOTESTOTERONE CYPIONATE) 200 MG/ML injection, INJECT 1CC  INTRA-MUSCULARLY EVERY 14 DAYS, Disp: , Rfl: 5    anastrozole (ARIMIDEX) 1 MG tablet, Take 1 mg by mouth daily. , Disp: , Rfl:     Albuterol (VENTOLIN IN), Inhale into the lungs, Disp: , Rfl:     baclofen (LIORESAL) 10 MG tablet, Take 10 mg by mouth 3 times daily, Disp: , Rfl:     cyclobenzaprine (FLEXERIL) 10 MG tablet, , Disp: , Rfl:     Family History   Problem Relation Age of Onset    High Blood Pressure Father     Mental Illness Sister     High Blood Pressure Paternal Grandmother     Cancer Paternal Grandmother     Heart Disease Paternal Grandfather     High Blood Pressure Paternal Grandfather     Stroke Paternal Grandfather        Social History     Social History    Marital status: Single     Spouse name: N/A    Number of children: N/A    Years of education: N/A     Occupational History    disability      Social History Main Topics    Smoking status: Never Smoker    Smokeless tobacco: Never Used    Alcohol use Yes      Comment: rare , hard cider or wine    Drug use: No    Sexual activity: No     Other Topics Concern    Not on file     Social History Narrative    No narrative on file       Review of Systems:  Review of Systems   Constitution: Negative. HENT: Negative. Eyes: Negative. Cardiovascular: Negative. Respiratory: Negative. Skin: Negative. Musculoskeletal: Negative. Gastrointestinal: Negative. Genitourinary: Negative. Neurological: Positive for headaches. Psychiatric/Behavioral: Positive for depression. Managing         Physical Exam:  /73   Pulse 90   Resp 16   Ht 6' 3\" (1.905 m)   Wt 280 lb (127 kg)   SpO2 98%   BMI 35.00 kg/m²     Physical Exam   Constitutional: He is oriented to person, place, and time and well-developed, well-nourished, and in no distress. HENT:   Head: Normocephalic. Eyes: Pupils are equal, round, and reactive to light. Neck: Normal range of motion. Neck supple. Pulmonary/Chest: Effort normal.   Musculoskeletal:        Cervical back: Normal.   Neurological: He is alert and oriented to person, place, and time. He has normal strength. Gait normal.   Reflex Scores:       Tricep reflexes are 2+ on the right side and 2+ on the left side. Bicep reflexes are 2+ on the right side and 2+ on the left side. Brachioradialis reflexes are 2+ on the right side and 2+ on the left side. Skin: Skin is warm, dry and intact.    Psychiatric: Affect and judgment normal.       Record/Diagnostics Review:    As above, I did review the imaging    Ass8/19/2017 12:34 AM - Bull, pn Incoming Lab Results From [employer] [facility]     Component Results     Component Value Ref Range & Units Status Collected Lab   Pain Management Drug Panel Interp, Urine Consistent   [facility] 08[facility]08/2017  1[facility]30 PM [facility]   ([facility]   ________________________________________________________________   DRUGS EXPECTED:   BUTRANS (BUPRENORPHINE) [7/27/17] Res, Ur See Below   Final 08/08/2017  1:30 PM [facility]   (NOTE)   Methodology: Qualitative Enzyme Immunoassay and Qualitative Liquid   Chromatography-Time of Flight-Mass Spectrometry, Quantitative   Spectrophotometry   The absence of expected drug(s) and/or drug metabolite(s) may   indicate non-compliance, inappropriate timing of specimen   collection relative to drug administration, poor drug absorption,   diluted/adulterated urine, or limitations of testing. The   concentration must be greater than or equal to the cutoff to be   reported as present.  If specific drug concentrations are   required, contact the laboratory within two weeks of specimen   collection to request quantification by a second analytical   technique. Interpretive questions should be directed to the   laboratory. Results based on immunoassay detection that do not match clinical   expectations should be   interpreted with caution. Confirmatory testing by mass   spectrometry for immunoassay-based results is available, if   ordered within two weeks of specimen collection. Additional   charges apply. For medical purposes only; not valid for forensic use. This test was developed and its performance characteristics   determined by Darin Walter. The U.S. Food and Drug   Administration has not approved or cleared this test; however, FDA   clearance or approval is not currently required for clinical use. The results are not intended to be used as the sole means for   clinical diagnosis or patient management decisions. EER Hi Res Interp Ur See Note   Final 08/08/2017  1:30 PM [facility]   (NOTE)   Access [facility] Enhanced Report using either link below:   -Direct access: [email or web address]   -Enter Username, Password: [email or web address]   Username: [email or web address]   Password: [email or web address]   Performed by Lexi Amador , [address] [facility] [phone]   www. Alina Otoole MD, Lab.  Director

## 2018-01-15 ENCOUNTER — HOSPITAL ENCOUNTER (OUTPATIENT)
Dept: PAIN MANAGEMENT | Age: 33
Discharge: HOME | End: 2018-01-15
Payer: MEDICAID

## 2018-01-15 VITALS
HEART RATE: 87 BPM | SYSTOLIC BLOOD PRESSURE: 112 MMHG | BODY MASS INDEX: 36.06 KG/M2 | RESPIRATION RATE: 16 BRPM | OXYGEN SATURATION: 96 % | WEIGHT: 290 LBS | HEIGHT: 75 IN | TEMPERATURE: 97.9 F | DIASTOLIC BLOOD PRESSURE: 78 MMHG

## 2018-01-15 DIAGNOSIS — M47.812 CERVICAL SPONDYLOSIS WITHOUT MYELOPATHY: ICD-10-CM

## 2018-01-15 DIAGNOSIS — M54.2 CERVICALGIA: ICD-10-CM

## 2018-01-15 DIAGNOSIS — Z79.891 ENCOUNTER FOR LONG-TERM OPIATE ANALGESIC USE: ICD-10-CM

## 2018-01-15 DIAGNOSIS — R51.9 GENERALIZED HEADACHES: ICD-10-CM

## 2018-01-15 DIAGNOSIS — Q98.4 KLINEFELTER SYNDROME: ICD-10-CM

## 2018-01-15 DIAGNOSIS — G43.919 INTRACTABLE MIGRAINE WITHOUT STATUS MIGRAINOSUS, UNSPECIFIED MIGRAINE TYPE: ICD-10-CM

## 2018-01-15 DIAGNOSIS — R51.9 BILATERAL HEADACHES: ICD-10-CM

## 2018-01-15 DIAGNOSIS — G43.519 INTRACTABLE PERSISTENT MIGRAINE AURA WITHOUT CEREBRAL INFARCTION AND WITHOUT STATUS MIGRAINOSUS: Primary | ICD-10-CM

## 2018-01-15 PROCEDURE — 99214 OFFICE O/P EST MOD 30 MIN: CPT | Performed by: PAIN MEDICINE

## 2018-01-15 RX ORDER — BUPRENORPHINE 5 UG/H
1 PATCH TRANSDERMAL WEEKLY
Qty: 4 PATCH | Refills: 0 | Status: SHIPPED | OUTPATIENT
Start: 2018-01-22 | End: 2018-04-09 | Stop reason: SDUPTHER

## 2018-01-15 ASSESSMENT — ENCOUNTER SYMPTOMS
VOMITING: 0
EYE WATERING: 0
EYES NEGATIVE: 1
EYE PAIN: 0
GASTROINTESTINAL NEGATIVE: 1
BLURRED VISION: 0
BACK PAIN: 0
ABDOMINAL PAIN: 0
NAUSEA: 0
RESPIRATORY NEGATIVE: 1

## 2018-01-15 ASSESSMENT — PAIN SCALES - GENERAL: PAINLEVEL_OUTOF10: 7

## 2018-01-15 ASSESSMENT — PAIN DESCRIPTION - FREQUENCY: FREQUENCY: CONTINUOUS

## 2018-01-15 ASSESSMENT — PAIN DESCRIPTION - DESCRIPTORS: DESCRIPTORS: HEADACHE

## 2018-01-15 ASSESSMENT — PAIN DESCRIPTION - PAIN TYPE: TYPE: CHRONIC PAIN

## 2018-01-15 ASSESSMENT — PAIN DESCRIPTION - PROGRESSION: CLINICAL_PROGRESSION: NOT CHANGED

## 2018-01-15 ASSESSMENT — PAIN DESCRIPTION - LOCATION: LOCATION: HEAD

## 2018-01-25 ENCOUNTER — TELEPHONE (OUTPATIENT)
Dept: PAIN MANAGEMENT | Age: 33
End: 2018-01-25

## 2018-01-30 ENCOUNTER — TELEPHONE (OUTPATIENT)
Dept: PAIN MANAGEMENT | Age: 33
End: 2018-01-30

## 2018-02-07 ENCOUNTER — TELEPHONE (OUTPATIENT)
Dept: PAIN MANAGEMENT | Age: 33
End: 2018-02-07

## 2018-02-08 ENCOUNTER — TELEPHONE (OUTPATIENT)
Dept: PAIN MANAGEMENT | Age: 33
End: 2018-02-08

## 2018-02-14 ENCOUNTER — HOSPITAL ENCOUNTER (OUTPATIENT)
Dept: PAIN MANAGEMENT | Age: 33
Discharge: HOME | End: 2018-02-14
Payer: MEDICAID

## 2018-02-14 VITALS
DIASTOLIC BLOOD PRESSURE: 92 MMHG | RESPIRATION RATE: 16 BRPM | BODY MASS INDEX: 36.68 KG/M2 | OXYGEN SATURATION: 97 % | HEIGHT: 75 IN | TEMPERATURE: 97.8 F | HEART RATE: 105 BPM | SYSTOLIC BLOOD PRESSURE: 130 MMHG | WEIGHT: 295 LBS

## 2018-02-14 DIAGNOSIS — G43.519 INTRACTABLE PERSISTENT MIGRAINE AURA WITHOUT CEREBRAL INFARCTION AND WITHOUT STATUS MIGRAINOSUS: ICD-10-CM

## 2018-02-14 DIAGNOSIS — R51.9 BILATERAL HEADACHES: ICD-10-CM

## 2018-02-14 DIAGNOSIS — G43.919 INTRACTABLE MIGRAINE WITHOUT STATUS MIGRAINOSUS, UNSPECIFIED MIGRAINE TYPE: ICD-10-CM

## 2018-02-14 DIAGNOSIS — Q98.4 KLINEFELTER SYNDROME: ICD-10-CM

## 2018-02-14 DIAGNOSIS — M54.2 CERVICALGIA: ICD-10-CM

## 2018-02-14 DIAGNOSIS — Z79.891 ENCOUNTER FOR LONG-TERM OPIATE ANALGESIC USE: ICD-10-CM

## 2018-02-14 DIAGNOSIS — M47.812 CERVICAL SPONDYLOSIS WITHOUT MYELOPATHY: ICD-10-CM

## 2018-02-14 DIAGNOSIS — R51.9 GENERALIZED HEADACHES: ICD-10-CM

## 2018-02-14 PROCEDURE — 99213 OFFICE O/P EST LOW 20 MIN: CPT

## 2018-02-14 PROCEDURE — 99213 OFFICE O/P EST LOW 20 MIN: CPT | Performed by: NURSE PRACTITIONER

## 2018-02-14 ASSESSMENT — ENCOUNTER SYMPTOMS
NAUSEA: 1
PHOTOPHOBIA: 1
RESPIRATORY NEGATIVE: 1

## 2018-02-14 NOTE — PROGRESS NOTES
Rfl:     Family History   Problem Relation Age of Onset    High Blood Pressure Father     Mental Illness Sister     High Blood Pressure Paternal Grandmother     Cancer Paternal Grandmother     Heart Disease Paternal Grandfather     High Blood Pressure Paternal Grandfather     Stroke Paternal Grandfather        Social History     Social History    Marital status: Single     Spouse name: N/A    Number of children: N/A    Years of education: N/A     Occupational History    disability      Social History Main Topics    Smoking status: Never Smoker    Smokeless tobacco: Never Used    Alcohol use Yes      Comment: rare , hard cider or wine    Drug use: No    Sexual activity: No     Other Topics Concern    Not on file     Social History Narrative    No narrative on file       Review of Systems:  Review of Systems   Constitution: Negative. Eyes: Positive for photophobia. Respiratory: Negative. Skin: Negative. Musculoskeletal: Negative. Gastrointestinal: Positive for nausea. Genitourinary: Negative. Neurological: Positive for vertigo. Psychiatric/Behavioral: Positive for depression. Physical Exam:  BP (!) 130/92   Pulse 105   Temp 97.8 °F (36.6 °C) (Oral)   Resp 16   Ht 6' 3\" (1.905 m)   Wt 295 lb (133.8 kg)   SpO2 97%   BMI 36.87 kg/m²     Physical Exam  Constitutional: He is oriented to person, place, and time and well-developed, well-nourished, and in no distress. Sunglasses on due to photophobia  HENT:   Head: Normocephalic. Eyes: Pupils are equal, round, and reactive to light. Neck: Normal range of motion. Neck supple. Pulmonary/Chest: Effort normal.   Musculoskeletal:        Cervical back: Normal.   Neurological: He is alert and oriented to person, place, and time. He has normal strength. Gait normal.   Reflex Scores:       Tricep reflexes are 2+ on the right side and 2+ on the left side.        Bicep reflexes are 2+ on the right side and 2+ on the left side.       Brachioradialis reflexes are 2+ on the right side and 2+ on the left side. Skin: Skin is warm, dry and intact. Psychiatric: Affect and judgment normal.     Record/Diagnostics Review:    As ab8/19/2017 12:34 AM - Bull, pn Incoming Lab Results From [facility]     Component Results     Component Value Ref Range & Units Status Collected Lab   Pain Management Drug Panel Interp, Urine Consistent   Final 08/08/2017  1:30 PM [facility]   (NOTE)   ________________________________________________________________   DRUGS EXPECTED:   BUTRANS (BUPRENORPHINE) [7/27/17]   NUCYNTA (TAPENTADOL) [8/8/17 AM]   ________________________________________________________________   CONSISTENT with medications and timing provided:   NUCYNTA (TAPENTADOL) : based on tapentadol, tapentadol-o-sulfate   BUTRANS (BUPRENORPHINE) : based on the absence of buprenorphine   and metabolites   ________________________________________________________________   INTERPRETIVE INFORMATION:Pain Mgt Abel, High Res/EMIT, Ur, Interp   Interpretation depends on accuracy and completeness of patient   medication information submitted by client. 6-Acetylmorphine, Ur Not Detected   Final 08/08/2017  1:30 PM [facility]   7-Aminoclonazepam, Urine Not Detected   Final 08/08/2017  1:30 PM [facility]   Alpha-OH-Alpraz, Urine Not Detected   Final 08/08/2017  1:30 PM [facility]   Alprazolam, Urine Not Detected   Final 08/08/2017  1:30 PM [facility]   Amphetamines, urine Not Detected   Final 08/08/2017  1:30 PM [facility]   Barbiturates, Ur Not Detected   Final 08/08/2017  1:30 PM [facility]   Benzoylecgonine, Ur Not Detected   Final 08/08/2017  1:30 PM [facility]   Buprenorphine Urine Not Detected   Final 08/08/2017  1:30 PM [facility]   Carisoprodol, Ur Not Detected   Final 08/08/2017  1:30 PM [facility]   (NOTE)   The carisoprodol immunoassay has cross-reactivity to carisoprodol   and meprobamate.     Clonazepam, Urine Not Detected   Final 08/08/2017  1:30 PM [facility]   Codeine, Urine Not Detected   Final 08/08/2017  1:30 PM [facility]   MDA, Ur Not Detected   Final 08/08/2017  1:30 PM [facility]   Diazepam, Urine Not Detected   Final 08/08/2017  1:30 PM [facility]   Ethyl Glucuronide Ur Not Detected   Final 08/08/2017  1:30 PM [facility]   Fentanyl, Ur Not Detected   Final 08/08/2017  1:30 PM [facility]   Hydrocodone, Urine Not Detected   Final 08/08/2017  1:30 PM [facility]   Hydromorphone, Urine Not Detected   Final 08/08/2017  1:30 PM [facility]   Lorazepam, Urine Not Detected   Final 08/08/2017  1:30 PM [facility]   Marijuana Metab, Ur Not Detected   Final 08/08/2017  1:30 PM [facility]   MDEA, CINDY, Ur Not Detected   Final 08/08/2017  1:30 PM [facility]   MDMA URINE Not Detected   Final 08/08/2017  1:30 PM [facility]   Meperidine Metab, Ur Not Detected   Final 08/08/2017  1:30 PM [facility]   Methadone, Urine Not Detected   Final 08/08/2017  1:30 PM [facility]   Methamphetamine, Urine Not Detected   Final 08/08/2017  1:30 PM [facility]   Methylphenidate Not Detected   Final 08/08/2017  1:30 PM [facility]   Midazolam, Urine Not Detected   Final 08/08/2017  1:30 PM [facility]   Morphine Urine Not Detected   Final 08/08/2017  1:30 PM [facility]   Norbuprenorphine, Urine Not Detected   Final 08/08/2017  1:30 PM [facility]   Nordiazepam, Urine Not Detected   Final 08/08/2017  1:30 PM [facility]   Norfentanyl, Urine Not Detected   Final 08/08/2017  1:30 PM [facility]   NORHYDROCODONE, URINE Not Detected   Final 08/08/2017  1:30 PM [facility]   Noroxycodone, Urine Not Detected   Final 08/08/2017  1:30 PM [facility]   NOROXYMORPHONE, URINE Not Detected   Final 08/08/2017  1:30 PM [facility]   Oxazepam, Urine Not Detected   Final 08/08/2017  1:30 PM [facility]   Oxycodone Urine Not Detected   Final 08/08/2017  1:30 PM [facility]   Oxymorphone, Urine Not Detected   Final 08/08/2017  1:30 PM [facility]   PCP, Urine Not Detected   Final 08/08/2017  1:30 PM [facility]   Phentermine, Ur Not Detected   Final 08/08/2017  1:30 PM [facility]   Propoxyphene, Urine Not Detected   Final 08/08/2017 medications only for uncontrolled rest pain or pain with exertional activities. I advised patient not to self escalate pain medications without consulting with us. At each of patient's future visits we will try to taper pain medications, while adjusting the adjunct medications, and re-evaluating for Physical Therapy to improve spinal and joint strength. We will continue to have discussions to decrease pain medications as tolerated.      Butrans patch has been denied by his [employer], patient and his mother  Were told prior authorization forms were completed and faxed to Insurance Co    TREATMENT OPTIONS:   Continue opioid therapy. Script written for [employer] requirements met. Satisfactory pain management plan. Medication helps with personal goals and self care needs. Patient is stable on current regimen of meds and medication is effectively managing pain, we will continue current medications without changes. As always, we encourage daily stretching and strengthening exercises, and recommend minimizing use of pain medications unless patient cannot get through daily activities due to pain.   Follow up appointment made for 4 weeks  Return in 4 weeks

## 2018-02-20 ENCOUNTER — TELEPHONE (OUTPATIENT)
Dept: PAIN MANAGEMENT | Age: 33
End: 2018-02-20

## 2018-02-20 NOTE — TELEPHONE ENCOUNTER
Retrieved voice message left by pt's mother yesterday. She reports they have received determination r/t appeal submitted before 02/07/2018. Butrans patch is now covered for 6 months.  Beck Bustillos RN

## 2018-03-13 ENCOUNTER — HOSPITAL ENCOUNTER (OUTPATIENT)
Dept: PAIN MANAGEMENT | Age: 33
Discharge: HOME | End: 2018-03-13
Payer: MEDICAID

## 2018-03-13 VITALS
SYSTOLIC BLOOD PRESSURE: 151 MMHG | WEIGHT: 295 LBS | DIASTOLIC BLOOD PRESSURE: 76 MMHG | HEIGHT: 75 IN | HEART RATE: 81 BPM | BODY MASS INDEX: 36.68 KG/M2 | OXYGEN SATURATION: 97 % | RESPIRATION RATE: 16 BRPM | TEMPERATURE: 98.5 F

## 2018-03-13 DIAGNOSIS — Q98.4 KLINEFELTER SYNDROME: ICD-10-CM

## 2018-03-13 DIAGNOSIS — Z79.891 ENCOUNTER FOR LONG-TERM OPIATE ANALGESIC USE: ICD-10-CM

## 2018-03-13 DIAGNOSIS — M47.812 CERVICAL SPONDYLOSIS WITHOUT MYELOPATHY: ICD-10-CM

## 2018-03-13 DIAGNOSIS — M54.2 CERVICALGIA: ICD-10-CM

## 2018-03-13 DIAGNOSIS — R51.9 BILATERAL HEADACHES: ICD-10-CM

## 2018-03-13 DIAGNOSIS — G43.919 INTRACTABLE MIGRAINE WITHOUT STATUS MIGRAINOSUS, UNSPECIFIED MIGRAINE TYPE: ICD-10-CM

## 2018-03-13 DIAGNOSIS — G43.519 INTRACTABLE PERSISTENT MIGRAINE AURA WITHOUT CEREBRAL INFARCTION AND WITHOUT STATUS MIGRAINOSUS: Primary | ICD-10-CM

## 2018-03-13 DIAGNOSIS — R51.9 GENERALIZED HEADACHES: ICD-10-CM

## 2018-03-13 PROCEDURE — 99213 OFFICE O/P EST LOW 20 MIN: CPT

## 2018-03-13 PROCEDURE — 99213 OFFICE O/P EST LOW 20 MIN: CPT | Performed by: NURSE PRACTITIONER

## 2018-03-13 RX ORDER — BUPRENORPHINE 5 UG/H
PATCH, EXTENDED RELEASE TRANSDERMAL
Refills: 0 | COMMUNITY
Start: 2018-02-19 | End: 2018-06-11 | Stop reason: SDUPTHER

## 2018-03-13 ASSESSMENT — ENCOUNTER SYMPTOMS
EYES NEGATIVE: 1
RESPIRATORY NEGATIVE: 1
GASTROINTESTINAL NEGATIVE: 1

## 2018-03-13 NOTE — PROGRESS NOTES
Hydromorphone, Urine Not Detected    Final 08/08/2017  1:30 PM [facility]   Lorazepam, Urine Not Detected    Final 08/08/2017  1:30 PM [facility]   Marijuana Metab, Ur Not Detected    Final 08/08/2017  1:30 PM [facility]   MDEA, CINDY, Ur Not Detected    Final 08/08/2017  1:30 PM [facility]   MDMA URINE Not Detected    Final 08/08/2017  1:30 PM [facility]   Meperidine Metab, Ur Not Detected    Final 08/08/2017  1:30 PM [facility]   Methadone, Urine Not Detected    Final 08/08/2017  1:30 PM [facility]   Methamphetamine, Urine Not Detected    Final 08/08/2017  1:30 PM [facility]   Methylphenidate Not Detected    Final 08/08/2017  1:30 PM [facility]   Midazolam, Urine Not Detected    Final 08/08/2017  1:30 PM [facility]   Morphine Urine Not Detected    Final 08/08/2017  1:30 PM [facility]   Norbuprenorphine, Urine Not Detected    Final 08/08/2017  1:30 PM [facility]   Nordiazepam, Urine Not Detected    Final 08/08/2017  1:30 PM [facility]   Norfentanyl, Urine Not Detected    Final 08/08/2017  1:30 PM [facility]   NORHYDROCODONE, URINE Not Detected    Final 08/08/2017  1:30 PM [facility]   Noroxycodone, Urine Not Detected    Final 08/08/2017  1:30 PM [facility]   NOROXYMORPHONE, URINE Not Detected    Final 08/08/2017  1:30 PM [facility]   Oxazepam, Urine Not Detected    Final 08/08/2017  1:30 PM [facility]   Oxycodone Urine Not Detected    Final 08/08/2017  1:30 PM [facility]   Oxymorphone, Urine Not Detected    Final 08/08/2017  1:30 PM [facility]   PCP, Urine Not Detected    Final 08/08/2017  1:30 PM [facility]   Phentermine, Ur Not Detected    Final 08/08/2017  1:30 PM [facility]   Propoxyphene, Urine Not Detected    Final 08/08/2017  1:30 PM [facility]   Tapentadol-O-Sulfate, Urine Present    Final 08/08/2017  1:30 PM [facility]   Tapentadol, Urine Present    Final 08/08/2017  1:30 PM [facility]   Temazepam, Urine Not Detected    Final 08/08/2017  1:30 PM [facility]   Tramadol, Urine Not Detected    Final 08/08/2017  1:30 PM [facility]   Zolpidem, Urine Not Detected    Final 08/08/2017  1:30 PM

## 2018-04-09 ENCOUNTER — HOSPITAL ENCOUNTER (OUTPATIENT)
Dept: PAIN MANAGEMENT | Age: 33
Discharge: HOME | End: 2018-04-09
Payer: MEDICAID

## 2018-04-09 DIAGNOSIS — M47.812 CERVICAL SPONDYLOSIS WITHOUT MYELOPATHY: ICD-10-CM

## 2018-04-09 DIAGNOSIS — R51.9 BILATERAL HEADACHES: ICD-10-CM

## 2018-04-09 DIAGNOSIS — G43.519 INTRACTABLE PERSISTENT MIGRAINE AURA WITHOUT CEREBRAL INFARCTION AND WITHOUT STATUS MIGRAINOSUS: ICD-10-CM

## 2018-04-09 DIAGNOSIS — M54.2 CERVICALGIA: ICD-10-CM

## 2018-04-09 DIAGNOSIS — R51.9 GENERALIZED HEADACHES: ICD-10-CM

## 2018-04-09 DIAGNOSIS — G43.919 INTRACTABLE MIGRAINE WITHOUT STATUS MIGRAINOSUS, UNSPECIFIED MIGRAINE TYPE: ICD-10-CM

## 2018-04-09 DIAGNOSIS — Q98.4 KLINEFELTER SYNDROME: ICD-10-CM

## 2018-04-09 DIAGNOSIS — Z79.891 ENCOUNTER FOR LONG-TERM OPIATE ANALGESIC USE: Primary | ICD-10-CM

## 2018-04-09 PROCEDURE — 99213 OFFICE O/P EST LOW 20 MIN: CPT

## 2018-04-09 PROCEDURE — 99213 OFFICE O/P EST LOW 20 MIN: CPT | Performed by: NURSE PRACTITIONER

## 2018-04-09 RX ORDER — BUPRENORPHINE 5 UG/H
1 PATCH TRANSDERMAL WEEKLY
Qty: 4 PATCH | Refills: 0 | Status: SHIPPED | OUTPATIENT
Start: 2018-04-09 | End: 2018-05-09

## 2018-04-09 ASSESSMENT — ENCOUNTER SYMPTOMS
EYE PAIN: 0
COUGH: 0
SHORTNESS OF BREATH: 0
BACK PAIN: 1
CONSTIPATION: 0

## 2018-05-07 ENCOUNTER — HOSPITAL ENCOUNTER (OUTPATIENT)
Dept: PAIN MANAGEMENT | Age: 33
Discharge: HOME | End: 2018-05-07
Payer: MEDICAID

## 2018-05-07 DIAGNOSIS — M47.812 CERVICAL SPONDYLOSIS WITHOUT MYELOPATHY: ICD-10-CM

## 2018-05-07 DIAGNOSIS — M54.2 CERVICALGIA: ICD-10-CM

## 2018-05-07 DIAGNOSIS — R51.9 GENERALIZED HEADACHES: ICD-10-CM

## 2018-05-07 DIAGNOSIS — G43.919 INTRACTABLE MIGRAINE WITHOUT STATUS MIGRAINOSUS, UNSPECIFIED MIGRAINE TYPE: ICD-10-CM

## 2018-05-07 DIAGNOSIS — G43.519 INTRACTABLE PERSISTENT MIGRAINE AURA WITHOUT CEREBRAL INFARCTION AND WITHOUT STATUS MIGRAINOSUS: ICD-10-CM

## 2018-05-07 DIAGNOSIS — Q98.4 KLINEFELTER SYNDROME: ICD-10-CM

## 2018-05-07 DIAGNOSIS — Z79.891 ENCOUNTER FOR LONG-TERM OPIATE ANALGESIC USE: ICD-10-CM

## 2018-05-07 DIAGNOSIS — R51.9 BILATERAL HEADACHES: Primary | ICD-10-CM

## 2018-05-07 PROCEDURE — 99213 OFFICE O/P EST LOW 20 MIN: CPT | Performed by: NURSE PRACTITIONER

## 2018-05-07 PROCEDURE — 99213 OFFICE O/P EST LOW 20 MIN: CPT

## 2018-05-07 ASSESSMENT — ENCOUNTER SYMPTOMS
SHORTNESS OF BREATH: 0
CONSTIPATION: 0
COUGH: 0

## 2018-06-11 ENCOUNTER — HOSPITAL ENCOUNTER (OUTPATIENT)
Dept: PAIN MANAGEMENT | Age: 33
Discharge: HOME | End: 2018-06-11
Payer: MEDICAID

## 2018-06-11 VITALS
DIASTOLIC BLOOD PRESSURE: 89 MMHG | RESPIRATION RATE: 16 BRPM | OXYGEN SATURATION: 98 % | WEIGHT: 295 LBS | SYSTOLIC BLOOD PRESSURE: 143 MMHG | HEART RATE: 116 BPM | TEMPERATURE: 98.6 F | BODY MASS INDEX: 36.68 KG/M2 | HEIGHT: 75 IN

## 2018-06-11 DIAGNOSIS — G43.519 INTRACTABLE PERSISTENT MIGRAINE AURA WITHOUT CEREBRAL INFARCTION AND WITHOUT STATUS MIGRAINOSUS: ICD-10-CM

## 2018-06-11 DIAGNOSIS — R51.9 BILATERAL HEADACHES: Primary | ICD-10-CM

## 2018-06-11 DIAGNOSIS — M47.812 CERVICAL SPONDYLOSIS WITHOUT MYELOPATHY: ICD-10-CM

## 2018-06-11 DIAGNOSIS — Q98.4 KLINEFELTER SYNDROME: ICD-10-CM

## 2018-06-11 DIAGNOSIS — Z79.891 ENCOUNTER FOR LONG-TERM OPIATE ANALGESIC USE: ICD-10-CM

## 2018-06-11 DIAGNOSIS — M54.2 CERVICALGIA: ICD-10-CM

## 2018-06-11 DIAGNOSIS — G43.919 INTRACTABLE MIGRAINE WITHOUT STATUS MIGRAINOSUS, UNSPECIFIED MIGRAINE TYPE: ICD-10-CM

## 2018-06-11 DIAGNOSIS — R51.9 GENERALIZED HEADACHES: ICD-10-CM

## 2018-06-11 PROCEDURE — 99213 OFFICE O/P EST LOW 20 MIN: CPT

## 2018-06-11 PROCEDURE — 99213 OFFICE O/P EST LOW 20 MIN: CPT | Performed by: NURSE PRACTITIONER

## 2018-06-11 RX ORDER — BUPRENORPHINE 5 UG/H
PATCH, EXTENDED RELEASE TRANSDERMAL
Qty: 4 PATCH | Refills: 0 | Status: SHIPPED | OUTPATIENT
Start: 2018-06-11 | End: 2018-07-10 | Stop reason: SDUPTHER

## 2018-06-11 ASSESSMENT — ENCOUNTER SYMPTOMS
SHORTNESS OF BREATH: 1
GASTROINTESTINAL NEGATIVE: 1

## 2018-07-10 ENCOUNTER — HOSPITAL ENCOUNTER (OUTPATIENT)
Dept: PAIN MANAGEMENT | Age: 33
Discharge: HOME | End: 2018-07-10
Payer: MEDICAID

## 2018-07-10 VITALS
TEMPERATURE: 98 F | RESPIRATION RATE: 16 BRPM | HEART RATE: 122 BPM | DIASTOLIC BLOOD PRESSURE: 93 MMHG | HEIGHT: 75 IN | BODY MASS INDEX: 36.68 KG/M2 | OXYGEN SATURATION: 95 % | SYSTOLIC BLOOD PRESSURE: 151 MMHG | WEIGHT: 295 LBS

## 2018-07-10 DIAGNOSIS — M47.812 CERVICAL SPONDYLOSIS WITHOUT MYELOPATHY: ICD-10-CM

## 2018-07-10 DIAGNOSIS — M54.2 CERVICALGIA: ICD-10-CM

## 2018-07-10 DIAGNOSIS — R51.9 BILATERAL HEADACHES: ICD-10-CM

## 2018-07-10 DIAGNOSIS — Z79.891 ENCOUNTER FOR LONG-TERM OPIATE ANALGESIC USE: ICD-10-CM

## 2018-07-10 DIAGNOSIS — G43.919 INTRACTABLE MIGRAINE WITHOUT STATUS MIGRAINOSUS, UNSPECIFIED MIGRAINE TYPE: ICD-10-CM

## 2018-07-10 DIAGNOSIS — R51.9 GENERALIZED HEADACHES: ICD-10-CM

## 2018-07-10 DIAGNOSIS — Q98.4 KLINEFELTER SYNDROME: ICD-10-CM

## 2018-07-10 DIAGNOSIS — G43.519 INTRACTABLE PERSISTENT MIGRAINE AURA WITHOUT CEREBRAL INFARCTION AND WITHOUT STATUS MIGRAINOSUS: ICD-10-CM

## 2018-07-10 LAB
AMPHETAMINE SCREEN URINE: POSITIVE
BARBITURATE SCREEN URINE: NEGATIVE
BENZODIAZEPINE SCREEN, URINE: NEGATIVE
BUPRENORPHINE URINE: ABNORMAL
CANNABINOID SCREEN URINE: NEGATIVE
COCAINE METABOLITE, URINE: NEGATIVE
MDMA URINE: ABNORMAL
METHADONE SCREEN, URINE: NEGATIVE
METHAMPHETAMINE, URINE: ABNORMAL
OPIATES, URINE: NEGATIVE
OXYCODONE SCREEN URINE: NEGATIVE
PHENCYCLIDINE, URINE: NEGATIVE
PROPOXYPHENE, URINE: ABNORMAL
TEST INFORMATION: ABNORMAL
TRICYCLIC ANTIDEPRESSANTS, UR: ABNORMAL

## 2018-07-10 PROCEDURE — 80307 DRUG TEST PRSMV CHEM ANLYZR: CPT

## 2018-07-10 PROCEDURE — 99214 OFFICE O/P EST MOD 30 MIN: CPT | Performed by: PAIN MEDICINE

## 2018-07-10 PROCEDURE — 99213 OFFICE O/P EST LOW 20 MIN: CPT

## 2018-07-10 RX ORDER — BUPRENORPHINE 5 UG/H
PATCH, EXTENDED RELEASE TRANSDERMAL
Qty: 4 PATCH | Refills: 0 | Status: SHIPPED | OUTPATIENT
Start: 2018-07-10 | End: 2018-08-06

## 2018-07-10 ASSESSMENT — ENCOUNTER SYMPTOMS
EYE WATERING: 0
BLURRED VISION: 0
RESPIRATORY NEGATIVE: 1
BACK PAIN: 0
VOMITING: 0
HEARTBURN: 1
ABDOMINAL PAIN: 0
EYE PAIN: 0
NAUSEA: 0
EYES NEGATIVE: 1

## 2018-07-10 ASSESSMENT — PAIN DESCRIPTION - PAIN TYPE: TYPE: CHRONIC PAIN

## 2018-07-10 ASSESSMENT — PAIN DESCRIPTION - PROGRESSION: CLINICAL_PROGRESSION: GRADUALLY WORSENING

## 2018-07-10 ASSESSMENT — PAIN DESCRIPTION - FREQUENCY: FREQUENCY: CONTINUOUS

## 2018-07-10 ASSESSMENT — PAIN SCALES - GENERAL: PAINLEVEL_OUTOF10: 6

## 2018-07-10 ASSESSMENT — PAIN DESCRIPTION - LOCATION: LOCATION: HEAD

## 2018-07-10 ASSESSMENT — PAIN DESCRIPTION - ORIENTATION: ORIENTATION: LEFT

## 2018-07-10 ASSESSMENT — PAIN DESCRIPTION - ONSET: ONSET: ON-GOING

## 2018-07-11 ASSESSMENT — ENCOUNTER SYMPTOMS
SHORTNESS OF BREATH: 0
COUGH: 0

## 2018-07-13 LAB
6-ACETYLMORPHINE, UR: NOT DETECTED
7-AMINOCLONAZEPAM, URINE: NOT DETECTED
ALPHA-OH-ALPRAZ, URINE: NOT DETECTED
ALPRAZOLAM, URINE: NOT DETECTED
AMPHETAMINES, URINE: NOT DETECTED
BARBITURATES, URINE: NOT DETECTED
BENZOYLECGONINE, UR: NOT DETECTED
BUPRENORPHINE URINE: NOT DETECTED
CARISOPRODOL, UR: NOT DETECTED
CLONAZEPAM, URINE: NOT DETECTED
CODEINE, URINE: NOT DETECTED
CREATININE URINE: 341 MG/DL (ref 20–400)
DIAZEPAM, URINE: NOT DETECTED
DRUGS EXPECTED, UR: NORMAL
EER HI RES INTERP UR: NORMAL
ETHYL GLUCURONIDE UR: NOT DETECTED
FENTANYL URINE: NOT DETECTED
HYDROCODONE, URINE: NOT DETECTED
HYDROMORPHONE, URINE: NOT DETECTED
LORAZEPAM, URINE: NOT DETECTED
MARIJUANA METAB, UR: NOT DETECTED
MDA, UR: NOT DETECTED
MDEA, EVE, UR: NOT DETECTED
MDMA URINE: NOT DETECTED
MEPERIDINE METAB, UR: NOT DETECTED
METHADONE, URINE: NOT DETECTED
METHAMPHETAMINE, URINE: NOT DETECTED
METHYLPHENIDATE: NOT DETECTED
MIDAZOLAM, URINE: NOT DETECTED
MORPHINE URINE: NOT DETECTED
NORBUPRENORPHINE, URINE: NOT DETECTED
NORDIAZEPAM, URINE: NOT DETECTED
NORFENTANYL, URINE: NOT DETECTED
NORHYDROCODONE, URINE: NOT DETECTED
NOROXYCODONE, URINE: NOT DETECTED
NOROXYMORPHONE, URINE: NOT DETECTED
OXAZEPAM, URINE: NOT DETECTED
OXYCODONE URINE: NOT DETECTED
OXYMORPHONE, URINE: NOT DETECTED
PAIN MANAGEMENT DRUG PANEL INTERP, URINE: NORMAL
PAIN MGT DRUG PANEL, HI RES, UR: NORMAL
PCP,URINE: NOT DETECTED
PHENTERMINE, UR: NOT DETECTED
PROPOXYPHENE, URINE: NOT DETECTED
TAPENTADOL, URINE: PRESENT
TAPENTADOL-O-SULFATE, URINE: PRESENT
TEMAZEPAM, URINE: NOT DETECTED
TRAMADOL, URINE: NOT DETECTED
ZOLPIDEM, URINE: NOT DETECTED

## 2018-07-16 ENCOUNTER — TELEPHONE (OUTPATIENT)
Dept: PAIN MANAGEMENT | Age: 33
End: 2018-07-16

## 2018-07-16 NOTE — TELEPHONE ENCOUNTER
[id number] Retrieved from  a fax from [employer], indicating patient's Zoe Crowder is approved for coverage until 10/11/2018. Left voice message at [facility] [facility], [facility], [place] advising of same.  Clint Hopper

## 2018-08-06 ENCOUNTER — HOSPITAL ENCOUNTER (OUTPATIENT)
Dept: PAIN MANAGEMENT | Age: 33
Discharge: HOME | End: 2018-08-06
Payer: MEDICAID

## 2018-08-06 VITALS
TEMPERATURE: 98.4 F | RESPIRATION RATE: 16 BRPM | HEIGHT: 75 IN | BODY MASS INDEX: 34.82 KG/M2 | HEART RATE: 92 BPM | DIASTOLIC BLOOD PRESSURE: 79 MMHG | WEIGHT: 280 LBS | OXYGEN SATURATION: 97 % | SYSTOLIC BLOOD PRESSURE: 142 MMHG

## 2018-08-06 DIAGNOSIS — Z79.891 ENCOUNTER FOR LONG-TERM OPIATE ANALGESIC USE: ICD-10-CM

## 2018-08-06 DIAGNOSIS — R51.9 GENERALIZED HEADACHES: ICD-10-CM

## 2018-08-06 DIAGNOSIS — G43.919 INTRACTABLE MIGRAINE WITHOUT STATUS MIGRAINOSUS, UNSPECIFIED MIGRAINE TYPE: ICD-10-CM

## 2018-08-06 DIAGNOSIS — Q98.4 KLINEFELTER SYNDROME: ICD-10-CM

## 2018-08-06 DIAGNOSIS — M54.2 CERVICALGIA: ICD-10-CM

## 2018-08-06 DIAGNOSIS — M47.812 CERVICAL SPONDYLOSIS WITHOUT MYELOPATHY: ICD-10-CM

## 2018-08-06 DIAGNOSIS — R51.9 BILATERAL HEADACHES: Primary | ICD-10-CM

## 2018-08-06 DIAGNOSIS — G43.519 INTRACTABLE PERSISTENT MIGRAINE AURA WITHOUT CEREBRAL INFARCTION AND WITHOUT STATUS MIGRAINOSUS: ICD-10-CM

## 2018-08-06 PROCEDURE — 99213 OFFICE O/P EST LOW 20 MIN: CPT

## 2018-08-06 PROCEDURE — 99213 OFFICE O/P EST LOW 20 MIN: CPT | Performed by: NURSE PRACTITIONER

## 2018-08-06 ASSESSMENT — ENCOUNTER SYMPTOMS
GASTROINTESTINAL NEGATIVE: 1
SHORTNESS OF BREATH: 1
PHOTOPHOBIA: 1

## 2018-08-06 NOTE — PROGRESS NOTES
Not Detected   Final 07/10/2018  3:00 PM [facility]   Carisoprodol, Ur Not Detected   Final 07/10/2018  3:00 PM [facility]   (NOTE)   The carisoprodol immunoassay has cross-reactivity to carisoprodol   and meprobamate.     Clonazepam, Urine Not Detected   Final 07/10/2018  3:00 PM [facility]   Codeine, Urine Not Detected   Final 07/10/2018  3:00 PM [facility]   MDA, Ur Not Detected   Final 07/10/2018  3:00 PM [facility]   Diazepam, Urine Not Detected   Final 07/10/2018  3:00 PM [facility]   Ethyl Glucuronide Ur Not Detected   Final 07/10/2018  3:00 PM [facility]   Fentanyl, Ur Not Detected   Final 07/10/2018  3:00 PM [facility]   Hydrocodone, Urine Not Detected   Final 07/10/2018  3:00 PM [facility]   Hydromorphone, Urine Not Detected   Final 07/10/2018  3:00 PM [facility]   Lorazepam, Urine Not Detected   Final 07/10/2018  3:00 PM [facility]   Marijuana Metab, Ur Not Detected   Final 07/10/2018  3:00 PM [facility]   MDEA, CINDY, Ur Not Detected   Final 07/10/2018  3:00 PM [facility]   MDMA, Urine Not Detected   Final 07/10/2018  3:00 PM [facility]   Meperidine Metab, Ur Not Detected   Final 07/10/2018  3:00 PM [facility]   Methadone, Urine Not Detected   Final 07/10/2018  3:00 PM [facility]   Methamphetamine, Urine Not Detected   Final 07/10/2018  3:00 PM [facility]   Methylphenidate Not Detected   Final 07/10/2018  3:00 PM [facility]   Midazolam, Urine Not Detected   Final 07/10/2018  3:00 PM [facility]   Morphine Urine Not Detected   Final 07/10/2018  3:00 PM [facility]   Norbuprenorphine, Urine Not Detected   Final 07/10/2018  3:00 PM [facility]   Nordiazepam, Urine Not Detected   Final 07/10/2018  3:00 PM [facility]   Norfentanyl, Urine Not Detected   Final 07/10/2018  3:00 PM [facility]   NORHYDROCODONE, URINE Not Detected   Final 07/10/2018  3:00 PM [facility]   Noroxycodone, Urine Not Detected   Final 07/10/2018  3:00 PM [facility]   NOROXYMORPHONE, URINE Not Detected   Final 07/10/2018  3:00 PM [facility]   Oxazepam, Urine Not Detected   Final 07/10/2018  3:00 PM [facility]   Oxycodone Urine Not Detected   Final 07/10/2018  3:00 PM [facility]   Oxymorphone, Urine Not purposes only; not valid for forensic use. This test was developed and its performance characteristics   determined by Darin Walter. The U.S. Food and Drug   Administration has not approved or cleared this test; however, FDA   clearance or approval is not currently required for clinical use. The results are not intended to be used as the sole means for   clinical diagnosis or patient management decisions. EER Hi Res Interp Ur See Note   Final 07/10/2018  3:00 PM [facility]   (NOTE)   Access [facility] Enhanced Report using either link below:   -Direct access: [email or web address]   -Enter Username, Password: [email or web address]   Username: [email or web address]   Password: [email or web address]   Performed by Darin Walter,   [address] [phone]   www. Eloy Snow MD, Lab. Director            Past Medical History:   Diagnosis Date    Asthma     Depression     Headache(784.0)     Klinefelter syndrome        Past Surgical History:   Procedure Laterality Date    UPPP UVULOPALATOPHARYGOPLASTY         Allergies   Allergen Reactions    Food      Bananas, eggs, milk, cherries    Seasonal      Hay fever           Current Outpatient Prescriptions:     [START ON 8/10/2018] tapentadol (NUCYNTA) 50 MG TABS, Take 1 tablet by mouth every 8 hours for 30 days. ., Disp: 90 tablet, Rfl: 0    BUTRANS 5 MCG/HR PTWK, APPLY ONE PATCH TO A HAIRLESS AREA EVERY WEEK., Disp: 4 patch, Rfl: 0    loratadine-pseudoephedrine (CLARITIN-D 12 HOUR) 5-120 MG per extended release tablet, Take 1 tablet by mouth 2 times daily, Disp: , Rfl:     sertraline (ZOLOFT) 100 MG tablet, TAKE ONE TABLET BY MOUTH ONCE A DAY EVERY MORNING, Disp: , Rfl: 5    testosterone cypionate (DEPOTESTOTERONE CYPIONATE) 200 MG/ML injection, INJECT 1CC  INTRA-MUSCULARLY EVERY 14 DAYS, Disp: , Rfl: 5    anastrozole (ARIMIDEX) 1 MG tablet, Take 1 mg by mouth daily. , Disp: , Rfl:     VENTOLIN  (90 Base) MCG/ACT exertional activities. I advised patient not to self-escalate pain medications without consulting with us. At each of patient's future visits we will try to taper pain medications, while adjusting the adjunct medications, and re-evaluating for Physical Therapy to improve spinal and joint strength. We will continue to have discussions to decrease pain medications as tolerated. Counseled patient on effects their pain medication and /or their medical condition may have on their  ability to drive or operate machinery.  Instructed not to drive or operate machinery if drowsy    He did not need refill on Butrans patch,  He had 2 patches left TREATMENT OPTIONS:   Return in 4 weeks  Medication Agreement Requirements Met  Continue Opioid therapy  Script written for  nucynta  Follow up appointment made

## 2018-09-06 ENCOUNTER — HOSPITAL ENCOUNTER (OUTPATIENT)
Dept: PAIN MANAGEMENT | Age: 33
Discharge: HOME | End: 2018-09-06
Payer: MEDICAID

## 2018-09-06 VITALS
HEIGHT: 75 IN | BODY MASS INDEX: 35.43 KG/M2 | HEART RATE: 61 BPM | SYSTOLIC BLOOD PRESSURE: 122 MMHG | TEMPERATURE: 99.1 F | RESPIRATION RATE: 15 BRPM | OXYGEN SATURATION: 97 % | DIASTOLIC BLOOD PRESSURE: 72 MMHG | WEIGHT: 285 LBS

## 2018-09-06 DIAGNOSIS — M47.812 CERVICAL SPONDYLOSIS WITHOUT MYELOPATHY: ICD-10-CM

## 2018-09-06 DIAGNOSIS — G43.519 INTRACTABLE PERSISTENT MIGRAINE AURA WITHOUT CEREBRAL INFARCTION AND WITHOUT STATUS MIGRAINOSUS: ICD-10-CM

## 2018-09-06 DIAGNOSIS — R51.9 GENERALIZED HEADACHES: ICD-10-CM

## 2018-09-06 DIAGNOSIS — M54.2 CERVICALGIA: ICD-10-CM

## 2018-09-06 DIAGNOSIS — G43.919 INTRACTABLE MIGRAINE WITHOUT STATUS MIGRAINOSUS, UNSPECIFIED MIGRAINE TYPE: ICD-10-CM

## 2018-09-06 DIAGNOSIS — Z79.891 ENCOUNTER FOR LONG-TERM OPIATE ANALGESIC USE: ICD-10-CM

## 2018-09-06 DIAGNOSIS — R51.9 BILATERAL HEADACHES: ICD-10-CM

## 2018-09-06 DIAGNOSIS — Q98.4 KLINEFELTER SYNDROME: ICD-10-CM

## 2018-09-06 PROCEDURE — 99214 OFFICE O/P EST MOD 30 MIN: CPT | Performed by: PAIN MEDICINE

## 2018-09-06 PROCEDURE — 99213 OFFICE O/P EST LOW 20 MIN: CPT

## 2018-09-06 RX ORDER — NADOLOL 40 MG/1
TABLET ORAL
Refills: 5 | COMMUNITY
Start: 2018-08-17

## 2018-09-06 RX ORDER — BUPRENORPHINE 5 UG/H
1 PATCH TRANSDERMAL WEEKLY
Qty: 4 PATCH | Refills: 0 | Status: SHIPPED | OUTPATIENT
Start: 2018-09-07 | End: 2018-11-28 | Stop reason: SDUPTHER

## 2018-09-06 RX ORDER — BUPRENORPHINE 5 UG/H
PATCH, EXTENDED RELEASE TRANSDERMAL
Qty: 4 PATCH | Refills: 0 | Status: CANCELLED | OUTPATIENT
Start: 2018-09-06 | End: 2018-10-03

## 2018-09-06 ASSESSMENT — ENCOUNTER SYMPTOMS
SHORTNESS OF BREATH: 1
GASTROINTESTINAL NEGATIVE: 1
EYES NEGATIVE: 1
WHEEZING: 1

## 2018-09-06 ASSESSMENT — PAIN - FUNCTIONAL ASSESSMENT: PAIN_FUNCTIONAL_ASSESSMENT: 0-10

## 2018-09-06 ASSESSMENT — PAIN DESCRIPTION - DESCRIPTORS: DESCRIPTORS: ACHING

## 2018-09-06 NOTE — PROGRESS NOTES
Patient relates current medications are helping the pain. Patient reports taking pain medications as prescribed, denies obtaining medications from different sources and denies use of illegal drugs. Patient denies side effects from medications like nausea, vomiting, constipation or drowsiness. Patient reports current activities of daily living ar possible due to medications and would like to continue them. OARRS compliant? yes  Concern for prescription abuse?no    Current Pain Assessment  Pain Assessment  Pain Assessment: 0-10  Pain Level: 6  Pain Type: Chronic pain  Pain Location: Head  Pain Orientation: Left  Pain Radiating Towards: none  Pain Descriptors: Aching  Pain Frequency: Continuous  Pain Onset: On-going  Effect of Pain on Daily Activities: severe headaches makes if difficuot to do ADL  Patient's Stated Pain Goal: 2 (to be able to be actove without severe pain)  Pain Intervention(s): Medication (see eMar), Repositioned, Rest, Cold applied                    ADVERSE MEDICATION EFFECTS:   Constipation: no  Bowel Regimen: No:   Diet: common adult  Appetite:  ok  Sedation:  no  Urinary Retention: no    FOCUSED PAIN SCALE:  Highest : 10  Lowest :3  Average: Range-6  When and What  was your last procedure:      Was your procedure effective:  not applicable    ACTIVITY/SOCIAL/EMOTIONAL:  Sleep Pattern: 6 hours per night.  generally restful sleep  Energy Level:  Normal  Currently attending Physical Therapy:  No  Home Exercises: rarely housecleaning  Mobility: no current mobility problem   Currently seeing a Psychiatrist or Psychologist:  No  Emotional Issues: normal   Mood: appropriate     ABERRANT BEHAVIORS SINCE LAST VISIT:  Have you ever been treated in another Pain Clinic yes  Refills for prescriptions appropriate: yes  Lost rx/pills: no  Taking more medication than prescribed:  no  Are you receiving PAIN medications from  other doctors: no  Last Urine/Serum Drug Screen :7/10/18  Was Serum/UDS as edema. Neurological: He is alert and oriented to person, place, and time. He has normal reflexes. He displays no atrophy and no tremor. No cranial nerve deficit or sensory deficit. He exhibits normal muscle tone. Coordination and gait normal.   Reflex Scores:       Tricep reflexes are 2+ on the right side and 2+ on the left side. Bicep reflexes are 2+ on the right side and 2+ on the left side. Skin: Skin is warm and dry. Psychiatric: He has a normal mood and affect. His speech is normal and behavior is normal. Judgment and thought content normal. Cognition and memory are normal.    Ortho Exam    Nurses Notes and Vital Signs reviewed. DATA  Labs:  Benzodiazepine Screen, Urine   Date Value Ref Range Status   07/10/2018 NEGATIVE NEG Final     Comment:           (Positive cutoff 200 ng/mL)                      Imaging:  Radiology Images and Reports reviewed where indicated and necessary  Findings:  There is normal alignment without significant alignment shift upon flexion.  There is no acute fracture or subluxation.  The dens is intact. Oblique views demonstrate the neural foramen to be widely patent bilaterally       The vertebral bodies demonstrate normal height.  The intervertebral disc spaces are well maintained.  There is no prevertebral soft tissue swelling.       Impression: Unremarkable cervical spine radiographs.       Final report electronically signed by Edyta Greenwood M.D. on 9/21/2016       Patient Active Problem List   Diagnosis    Intractable migraine without status migrainosus    Bilateral headaches    Encounter for long-term opiate analgesic use    Cervicalgia    Cervical spondylosis without myelopathy    Generalized headaches    Klinefelter syndrome        ASSESSMENT    Bhavna Almodovar is a 29 y.o. male with     1. Intractable persistent migraine aura without cerebral infarction and without status migrainosus    2. Encounter for long-term opiate analgesic use    3.  Intractable

## 2018-09-07 ASSESSMENT — ENCOUNTER SYMPTOMS
EYE PAIN: 0
EYE WATERING: 0
ABDOMINAL PAIN: 0
COUGH: 0
BLURRED VISION: 0
VOMITING: 0
NAUSEA: 0
BACK PAIN: 0

## 2018-10-02 ENCOUNTER — HOSPITAL ENCOUNTER (OUTPATIENT)
Dept: PAIN MANAGEMENT | Age: 33
Discharge: HOME | End: 2018-10-02
Payer: MEDICAID

## 2018-10-02 VITALS
RESPIRATION RATE: 16 BRPM | WEIGHT: 280 LBS | SYSTOLIC BLOOD PRESSURE: 129 MMHG | HEART RATE: 60 BPM | OXYGEN SATURATION: 97 % | BODY MASS INDEX: 34.82 KG/M2 | DIASTOLIC BLOOD PRESSURE: 80 MMHG | TEMPERATURE: 98.2 F | HEIGHT: 75 IN

## 2018-10-02 DIAGNOSIS — R51.9 GENERALIZED HEADACHES: ICD-10-CM

## 2018-10-02 DIAGNOSIS — G43.519 INTRACTABLE PERSISTENT MIGRAINE AURA WITHOUT CEREBRAL INFARCTION AND WITHOUT STATUS MIGRAINOSUS: Primary | ICD-10-CM

## 2018-10-02 DIAGNOSIS — M54.2 CERVICALGIA: ICD-10-CM

## 2018-10-02 DIAGNOSIS — M47.812 CERVICAL SPONDYLOSIS WITHOUT MYELOPATHY: ICD-10-CM

## 2018-10-02 DIAGNOSIS — Q98.4 KLINEFELTER SYNDROME: ICD-10-CM

## 2018-10-02 DIAGNOSIS — Z79.891 ENCOUNTER FOR LONG-TERM OPIATE ANALGESIC USE: ICD-10-CM

## 2018-10-02 DIAGNOSIS — R51.9 BILATERAL HEADACHES: ICD-10-CM

## 2018-10-02 DIAGNOSIS — G43.919 INTRACTABLE MIGRAINE WITHOUT STATUS MIGRAINOSUS, UNSPECIFIED MIGRAINE TYPE: ICD-10-CM

## 2018-10-02 PROCEDURE — 99213 OFFICE O/P EST LOW 20 MIN: CPT | Performed by: NURSE PRACTITIONER

## 2018-10-02 PROCEDURE — 99213 OFFICE O/P EST LOW 20 MIN: CPT

## 2018-10-02 ASSESSMENT — ENCOUNTER SYMPTOMS
SHORTNESS OF BREATH: 1
GASTROINTESTINAL NEGATIVE: 1

## 2018-10-02 NOTE — PROGRESS NOTES
[facility] PROGRESS NOTE      Patient here today to review MEDICATION CONTRACT    Chief Complaint: headache    HPI: He c/o increased pain with headache, headache are the same. He is sleeping well. He just started new medication for migraines aimovig monthly. Headaches decrease to a 4 with nucynta helps for a few hours. and a 2 with butrans patch  for the first few days. NO Ed visits. His butrans patch was denied by his Insurance Co.    .    Migraine    This is a chronic problem. The current episode started more than 1 year ago. The problem occurs constantly. The problem has been gradually worsening. The pain is located in the right unilateral region. The pain does not radiate. The pain quality is similar to prior headaches. Quality: heavy. The pain is at a severity of 7/10. Associated symptoms comments: Aura before headache, vision changes. The symptoms are aggravated by bright light. He has tried NSAIDs, oral narcotics, darkened room, cold packs and beta blockers for the symptoms. The treatment provided mild relief. Patient denies any new neurological symptoms. No bowel or bladder incontinence, no weakness, and no falling. Treatment goals:  Functional status: make pain manageable      Aberrancy     Short on pills  yes  [] no    [x]   Alcohol use  Yes    []   No   [x]                  illicit drug use   Yes     []   No    [x]    Analgesia pain 7  Adverse  Effects :  drowsiness  ADL;s :less active        Pill count: appropriate    Morphine equivalent dose as reported on OARRS:60  Attestation: The Prescription Monitoring Report for this patient was reviewed today. KASSANDRA Santana CNP)  Documentation: Obtaining appropriate analgesic effect of treatment., No signs of potential drug abuse or diversion identified. KASSANDRA Santana CNP)  Medication Contracts: Existing medication contract.  KASSANDRA Santana CNP)  Review of OARRS does not show any aberrant prescription Additional   charges apply. For medical purposes only; not valid for forensic use. This test was developed and its performance characteristics   determined by Darin Walter. The U.S. Food and Drug   Administration has not approved or cleared this test; however, FDA   clearance or approval is not currently required for clinical use. The results are not intended to be used as the sole means for   clinical diagnosis or patient management decisions. EER Hi Res Interp Ur See Note   Final 07/10/2018  3:00 PM [facility]   (NOTE)   Access [facility] Enhanced Report using either link below:   -Direct access: [email or web address]   -Enter Username, Password: [email or web address]   Username: [email or web address]   Password: [email or web address]   Performed by Darin Walter,   [place] [address], [address] [facility] [phone]   www. Meli Campbell MD, Lab. Director          Past Medical History:   Diagnosis Date    Asthma     Depression     Headache(784.0)     Klinefelter syndrome        Past Surgical History:   Procedure Laterality Date    UPPP UVULOPALATOPHARYGOPLASTY         Allergies   Allergen Reactions    Food      Bananas, eggs, milk, cherries    Seasonal      Hay fever           Current Outpatient Prescriptions:     Erenumab-aooe (AIMOVIG) 70 MG/ML SOAJ, Inject into the skin monthly, Disp: , Rfl:     [START ON 10/3/2018] tapentadol (NUCYNTA) 50 MG TABS, Take 1 tablet by mouth every 8 hours for 30 days. ., Disp: 90 tablet, Rfl: 0    nadolol (CORGARD) 40 MG tablet, 1/4-1/2 AS DIRECTED FOR TREMOR, Disp: , Rfl: 5    loratadine-pseudoephedrine (CLARITIN-D 12 HOUR) 5-120 MG per extended release tablet, Take 1 tablet by mouth 2 times daily, Disp: , Rfl:     sertraline (ZOLOFT) 100 MG tablet, TAKE ONE TABLET BY MOUTH ONCE A DAY EVERY MORNING, Disp: , Rfl: 5    testosterone cypionate (DEPOTESTOTERONE CYPIONATE) 200 MG/ML injection, INJECT 1CC  INTRA-MUSCULARLY EVERY 14 DAYS, Disp: , Rfl: 5   Cervical back: Normal.   Neurological: He is alert and oriented to person, place, and time. Gait normal.   Reflex Scores:       Tricep reflexes are 2+ on the right side and 2+ on the left side. Bicep reflexes are 2+ on the right side and 2+ on the left side. Brachioradialis reflexes are 2+ on the right side and 2+ on the left side. Skin: Skin is warm, dry and intact. Psychiatric: Affect and judgment normal.         Assessment:    Problem List Items Addressed This Visit     Intractable migraine without status migrainosus - Primary    Relevant Medications    Erenumab-aooe (AIMOVIG) 70 MG/ML SOAJ    tapentadol (NUCYNTA) 50 MG TABS (Start on 10/3/2018)    Bilateral headaches    Relevant Medications    Erenumab-aooe (AIMOVIG) 70 MG/ML SOAJ    tapentadol (NUCYNTA) 50 MG TABS (Start on 10/3/2018)    Encounter for long-term opiate analgesic use    Relevant Medications    tapentadol (NUCYNTA) 50 MG TABS (Start on 10/3/2018)    Cervicalgia    Relevant Medications    tapentadol (NUCYNTA) 50 MG TABS (Start on 10/3/2018)    Cervical spondylosis without myelopathy    Relevant Medications    tapentadol (NUCYNTA) 50 MG TABS (Start on 10/3/2018)    Generalized headaches    Relevant Medications    Erenumab-aooe (AIMOVIG) 70 MG/ML SOAJ    tapentadol (NUCYNTA) 50 MG TABS (Start on 10/3/2018)    Klinefelter syndrome    Relevant Medications    tapentadol (NUCYNTA) 50 MG TABS (Start on 10/3/2018)          Treatment Plan:  DISCUSSION: Treatment options discussed with patient and all questions answered to patient's satisfaction.      Possible side effects, risk of tolerance and or dependence and alternative treatments discussed    Obtaining appropriate analgesic effect of treatment   No signs of potential drug abuse or diversion identified    [x] Ill effects of being on chronic pain medications such as sleep disturbances, respiratory depression, hormonal changes, withdrawal symptoms, chronic opioid dependence and

## 2018-10-31 ENCOUNTER — HOSPITAL ENCOUNTER (OUTPATIENT)
Dept: PAIN MANAGEMENT | Age: 33
Discharge: HOME | End: 2018-10-31
Payer: MEDICAID

## 2018-10-31 VITALS
TEMPERATURE: 98.2 F | HEIGHT: 75 IN | RESPIRATION RATE: 20 BRPM | SYSTOLIC BLOOD PRESSURE: 118 MMHG | BODY MASS INDEX: 34.82 KG/M2 | OXYGEN SATURATION: 97 % | DIASTOLIC BLOOD PRESSURE: 70 MMHG | HEART RATE: 62 BPM | WEIGHT: 280 LBS

## 2018-10-31 DIAGNOSIS — G43.519 INTRACTABLE PERSISTENT MIGRAINE AURA WITHOUT CEREBRAL INFARCTION AND WITHOUT STATUS MIGRAINOSUS: Primary | ICD-10-CM

## 2018-10-31 DIAGNOSIS — Q98.4 KLINEFELTER SYNDROME: ICD-10-CM

## 2018-10-31 DIAGNOSIS — R51.9 GENERALIZED HEADACHES: ICD-10-CM

## 2018-10-31 DIAGNOSIS — Z79.891 ENCOUNTER FOR LONG-TERM OPIATE ANALGESIC USE: ICD-10-CM

## 2018-10-31 DIAGNOSIS — M47.812 CERVICAL SPONDYLOSIS WITHOUT MYELOPATHY: ICD-10-CM

## 2018-10-31 DIAGNOSIS — R51.9 BILATERAL HEADACHES: ICD-10-CM

## 2018-10-31 DIAGNOSIS — M54.2 CERVICALGIA: ICD-10-CM

## 2018-10-31 DIAGNOSIS — G43.919 INTRACTABLE MIGRAINE WITHOUT STATUS MIGRAINOSUS, UNSPECIFIED MIGRAINE TYPE: ICD-10-CM

## 2018-10-31 PROCEDURE — 99213 OFFICE O/P EST LOW 20 MIN: CPT

## 2018-10-31 PROCEDURE — 99213 OFFICE O/P EST LOW 20 MIN: CPT | Performed by: NURSE PRACTITIONER

## 2018-10-31 ASSESSMENT — ENCOUNTER SYMPTOMS
RESPIRATORY NEGATIVE: 1
GASTROINTESTINAL NEGATIVE: 1
EYES NEGATIVE: 1

## 2018-10-31 NOTE — PROGRESS NOTES
the UDS appropriate:  yes      Record/Diagnostics Review:      As above, I did review the imaging  7/13/2018 11:53 AM - Bull, [facility] Incoming Lab Results From [facility]     Component Results     Component Value Ref Range & Units Status Collected Lab   Pain Management Drug Panel Interp, Urine Inconsistent   Final 07/10/2018  3:00 PM [facility]   (NOTE)   ________________________________________________________________   DRUGS EXPECTED:   BUTRANS (BUPRENORPHINE) [7/9/18]   Eileen Korbel (TAPENTADOL) [7/10/18]   ________________________________________________________________   CONSISTENT with medications provided:   Eileen Korbel (TAPENTADOL) : based on tapentadol, tapentadol-o-sulfate   ________________________________________________________________   INCONSISTENT with medications provided:   BUTRANS (BUPRENORPHINE) : based on the absence of buprenorphine   and metabolites   ________________________________________________________________   INTERPRETIVE INFORMATION: Pain Mgt Abel, Mass Spec/EMIT, Ur,                            Interp   Interpretation depends on accuracy and completeness of patient   medication information submitted by client. 6-Acetylmorphine, Ur Not Detected   Final 07/10/2018  3:00 PM [facility]   7-Aminoclonazepam, Urine Not Detected   Final 07/10/2018  3:00 PM [facility]   Alpha-OH-Alpraz, Urine Not Detected   Final 07/10/2018  3:00 PM [facility]   Alprazolam, Urine Not Detected   Final 07/10/2018  3:00 PM [facility]   Amphetamines, urine Not Detected   Final 07/10/2018  3:00 PM [facility]   Barbiturates, Ur Not Detected   Final 07/10/2018  3:00 PM [facility]   Benzoylecgonine, Ur Not Detected   Final 07/10/2018  3:00 PM [facility]   Buprenorphine Urine Not Detected   Final 07/10/2018  3:00 PM [facility]   Carisoprodol, Ur Not Detected   Final 07/10/2018  3:00 PM [facility]   (NOTE)   The carisoprodol immunoassay has cross-reactivity to carisoprodol   and meprobamate.     Clonazepam, Urine Not Detected   Final 07/10/2018  3:00 PM [facility]   Codeine, Urine Not Detected

## 2018-11-28 ENCOUNTER — HOSPITAL ENCOUNTER (OUTPATIENT)
Dept: PAIN MANAGEMENT | Age: 33
Discharge: HOME | End: 2018-11-28
Payer: MEDICAID

## 2018-11-28 VITALS
WEIGHT: 280 LBS | DIASTOLIC BLOOD PRESSURE: 73 MMHG | HEART RATE: 73 BPM | SYSTOLIC BLOOD PRESSURE: 124 MMHG | TEMPERATURE: 97.9 F | OXYGEN SATURATION: 98 % | HEIGHT: 75 IN | RESPIRATION RATE: 20 BRPM | BODY MASS INDEX: 34.82 KG/M2

## 2018-11-28 DIAGNOSIS — Z79.891 ENCOUNTER FOR LONG-TERM OPIATE ANALGESIC USE: ICD-10-CM

## 2018-11-28 DIAGNOSIS — Q98.4 KLINEFELTER SYNDROME: ICD-10-CM

## 2018-11-28 DIAGNOSIS — R51.9 BILATERAL HEADACHES: ICD-10-CM

## 2018-11-28 DIAGNOSIS — G43.919 INTRACTABLE MIGRAINE WITHOUT STATUS MIGRAINOSUS, UNSPECIFIED MIGRAINE TYPE: ICD-10-CM

## 2018-11-28 DIAGNOSIS — G43.519 INTRACTABLE PERSISTENT MIGRAINE AURA WITHOUT CEREBRAL INFARCTION AND WITHOUT STATUS MIGRAINOSUS: Primary | ICD-10-CM

## 2018-11-28 DIAGNOSIS — M54.2 CERVICALGIA: ICD-10-CM

## 2018-11-28 DIAGNOSIS — R51.9 GENERALIZED HEADACHES: ICD-10-CM

## 2018-11-28 DIAGNOSIS — M47.812 CERVICAL SPONDYLOSIS WITHOUT MYELOPATHY: ICD-10-CM

## 2018-11-28 PROCEDURE — 99213 OFFICE O/P EST LOW 20 MIN: CPT | Performed by: NURSE PRACTITIONER

## 2018-11-28 PROCEDURE — 99213 OFFICE O/P EST LOW 20 MIN: CPT

## 2018-11-28 RX ORDER — BUPRENORPHINE 5 UG/H
1 PATCH TRANSDERMAL WEEKLY
Qty: 4 PATCH | Refills: 0 | Status: SHIPPED | OUTPATIENT
Start: 2018-11-28 | End: 2019-01-02 | Stop reason: SDUPTHER

## 2018-11-28 ASSESSMENT — ENCOUNTER SYMPTOMS
NAUSEA: 1
EYES NEGATIVE: 1

## 2018-11-28 NOTE — PROGRESS NOTES
Urine Not Detected    Final 07/10/2018  3:00 PM [facility]   Codeine, Urine Not Detected    Final 07/10/2018  3:00 PM [facility]   MDA, Ur Not Detected    Final 07/10/2018  3:00 PM [facility]   Diazepam, Urine Not Detected    Final 07/10/2018  3:00 PM [facility]   Ethyl Glucuronide Ur Not Detected    Final 07/10/2018  3:00 PM [facility]   Fentanyl, Ur Not Detected    Final 07/10/2018  3:00 PM [facility]   Hydrocodone, Urine Not Detected    Final 07/10/2018  3:00 PM [facility]   Hydromorphone, Urine Not Detected    Final 07/10/2018  3:00 PM [facility]   Lorazepam, Urine Not Detected    Final 07/10/2018  3:00 PM [facility]   Marijuana Metab, Ur Not Detected    Final 07/10/2018  3:00 PM [facility]   MDEA, CINDY, Ur Not Detected    Final 07/10/2018  3:00 PM [facility]   MDMA, Urine Not Detected    Final 07/10/2018  3:00 PM [facility]   Meperidine Metab, Ur Not Detected    Final 07/10/2018  3:00 PM [facility]   Methadone, Urine Not Detected    Final 07/10/2018  3:00 PM [facility]   Methamphetamine, Urine Not Detected    Final 07/10/2018  3:00 PM [facility]   Methylphenidate Not Detected    Final 07/10/2018  3:00 PM [facility]   Midazolam, Urine Not Detected    Final 07/10/2018  3:00 PM [facility]   Morphine Urine Not Detected    Final 07/10/2018  3:00 PM [facility]   Norbuprenorphine, Urine Not Detected    Final 07/10/2018  3:00 PM [facility]   Nordiazepam, Urine Not Detected    Final 07/10/2018  3:00 PM [facility]   Norfentanyl, Urine Not Detected    Final 07/10/2018  3:00 PM [facility]   NORHYDROCODONE, URINE Not Detected    Final 07/10/2018  3:00 PM [facility]   Noroxycodone, Urine Not Detected    Final 07/10/2018  3:00 PM [facility]   NOROXYMORPHONE, URINE Not Detected    Final 07/10/2018  3:00 PM [facility]   Oxazepam, Urine Not Detected    Final 07/10/2018  3:00 PM [facility]   Oxycodone Urine Not Detected    Final 07/10/2018  3:00 PM [facility]   Oxymorphone, Urine Not Detected    Final 07/10/2018  3:00 PM [facility]   PCP, Urine Not Detected    Final 07/10/2018  3:00 PM [facility]   Phentermine, Ur Not Detected    Final 07/10/2018  3:00 PM [facility]   Propoxyphene, Urine Not Detected Mental Illness Sister     High Blood Pressure Paternal Grandmother     Cancer Paternal Grandmother     Heart Disease Paternal Grandfather     High Blood Pressure Paternal Grandfather     Stroke Paternal Grandfather        Social History     Social History    Marital status: Single     Spouse name: N/A    Number of children: N/A    Years of education: N/A     Occupational History    disability      Social History Main Topics    Smoking status: Never Smoker    Smokeless tobacco: Never Used    Alcohol use Yes      Comment: rare , hard cider or wine    Drug use: No    Sexual activity: No     Other Topics Concern    Not on file     Social History Narrative    No narrative on file       Review of Systems:  Review of Systems   Constitution: Negative. HENT: Negative. Eyes: Negative. Cardiovascular: Negative. Endocrine: Negative. Hematologic/Lymphatic: Negative. Skin: Negative. Musculoskeletal: Negative. Gastrointestinal: Positive for nausea. Genitourinary: Negative. Neurological: Positive for headaches. Psychiatric/Behavioral: Positive for depression. The patient is nervous/anxious. Managing         Physical Exam:  /73   Pulse 73   Temp 97.9 °F (36.6 °C) (Oral)   Resp 20   Ht 6' 3\" (1.905 m)   Wt 280 lb (127 kg)   SpO2 98%   BMI 35.00 kg/m²     Physical Exam   Constitutional: He is oriented to person, place, and time. He appears well-developed. HENT:   Head: Normocephalic. 1. Red, area of pain, no tenderness on palpation   Eyes: Pupils are equal, round, and reactive to light. EOM are normal.   Neck: Normal range of motion. Neck supple. Pulmonary/Chest: Effort normal.   Musculoskeletal: Normal range of motion. Neurological: He is alert and oriented to person, place, and time. He has normal strength. Reflex Scores:       Tricep reflexes are 2+ on the right side and 2+ on the left side.        Bicep reflexes are 2+ on the right side and 2+ on the left side. Brachioradialis reflexes are 2+ on the right side and 2+ on the left side. Skin: Skin is warm, dry and intact. Psychiatric: He has a normal mood and affect. His speech is normal and behavior is normal. Judgment and thought content normal. Cognition and memory are normal.         Assessment:      Problem List Items Addressed This Visit     Intractable migraine without status migrainosus - Primary    Relevant Medications    buprenorphine (BUTRANS) 5 MCG/HR PTWK    tapentadol (NUCYNTA) 50 MG TABS (Start on 11/30/2018)    Bilateral headaches    Relevant Medications    buprenorphine (BUTRANS) 5 MCG/HR PTWK    tapentadol (NUCYNTA) 50 MG TABS (Start on 11/30/2018)    Encounter for long-term opiate analgesic use    Relevant Medications    tapentadol (NUCYNTA) 50 MG TABS (Start on 11/30/2018)    Cervicalgia    Relevant Medications    buprenorphine (BUTRANS) 5 MCG/HR PTWK    tapentadol (NUCYNTA) 50 MG TABS (Start on 11/30/2018)    Cervical spondylosis without myelopathy    Relevant Medications    buprenorphine (BUTRANS) 5 MCG/HR PTWK    tapentadol (NUCYNTA) 50 MG TABS (Start on 11/30/2018)    Generalized headaches    Relevant Medications    buprenorphine (BUTRANS) 5 MCG/HR PTWK    tapentadol (NUCYNTA) 50 MG TABS (Start on 11/30/2018)    Klinefelter syndrome    Relevant Medications    tapentadol (NUCYNTA) 50 MG TABS (Start on 11/30/2018)            Treatment Plan:  DISCUSSION: Treatment options discussed withpatient and all questions answered to patient's satisfaction.      Possible side effects, risk of tolerance and or dependence and alternative treatments discussed    Obtaining appropriate analgesic effect of treatment   No signs of potential drug abuse or diversion identified    [x] Ill effects of being on chronic pain medications such as sleepdisturbances, respiratory depression, hormonal changes, withdrawal symptoms, chronic opioid dependence and tolerance as well as risk of taking opioids with

## 2019-01-02 ENCOUNTER — HOSPITAL ENCOUNTER (OUTPATIENT)
Dept: PAIN MANAGEMENT | Age: 34
Discharge: HOME | End: 2019-01-02
Payer: MEDICAID

## 2019-01-02 VITALS
WEIGHT: 285 LBS | HEIGHT: 75 IN | DIASTOLIC BLOOD PRESSURE: 76 MMHG | RESPIRATION RATE: 16 BRPM | SYSTOLIC BLOOD PRESSURE: 109 MMHG | OXYGEN SATURATION: 97 % | TEMPERATURE: 98.1 F | HEART RATE: 65 BPM | BODY MASS INDEX: 35.43 KG/M2

## 2019-01-02 DIAGNOSIS — G43.919 INTRACTABLE MIGRAINE WITHOUT STATUS MIGRAINOSUS, UNSPECIFIED MIGRAINE TYPE: ICD-10-CM

## 2019-01-02 DIAGNOSIS — Q98.4 KLINEFELTER SYNDROME: ICD-10-CM

## 2019-01-02 DIAGNOSIS — M54.2 CERVICALGIA: ICD-10-CM

## 2019-01-02 DIAGNOSIS — Z79.891 ENCOUNTER FOR LONG-TERM OPIATE ANALGESIC USE: ICD-10-CM

## 2019-01-02 DIAGNOSIS — R51.9 BILATERAL HEADACHES: ICD-10-CM

## 2019-01-02 DIAGNOSIS — G43.519 INTRACTABLE PERSISTENT MIGRAINE AURA WITHOUT CEREBRAL INFARCTION AND WITHOUT STATUS MIGRAINOSUS: Primary | ICD-10-CM

## 2019-01-02 DIAGNOSIS — M47.812 CERVICAL SPONDYLOSIS WITHOUT MYELOPATHY: ICD-10-CM

## 2019-01-02 DIAGNOSIS — R51.9 GENERALIZED HEADACHES: ICD-10-CM

## 2019-01-02 PROCEDURE — 99213 OFFICE O/P EST LOW 20 MIN: CPT

## 2019-01-02 RX ORDER — BUPRENORPHINE 5 UG/H
1 PATCH TRANSDERMAL WEEKLY
Qty: 4 PATCH | Refills: 0 | Status: SHIPPED | OUTPATIENT
Start: 2019-01-29 | End: 2019-03-20 | Stop reason: SDUPTHER

## 2019-01-02 ASSESSMENT — ENCOUNTER SYMPTOMS
GASTROINTESTINAL NEGATIVE: 1
SHORTNESS OF BREATH: 1
EYES NEGATIVE: 1

## 2019-01-29 ENCOUNTER — HOSPITAL ENCOUNTER (OUTPATIENT)
Dept: PAIN MANAGEMENT | Age: 34
Discharge: HOME | End: 2019-01-29
Payer: MEDICAID

## 2019-01-29 DIAGNOSIS — G43.919 INTRACTABLE MIGRAINE WITHOUT STATUS MIGRAINOSUS, UNSPECIFIED MIGRAINE TYPE: ICD-10-CM

## 2019-01-29 DIAGNOSIS — G43.519 INTRACTABLE PERSISTENT MIGRAINE AURA WITHOUT CEREBRAL INFARCTION AND WITHOUT STATUS MIGRAINOSUS: ICD-10-CM

## 2019-01-29 DIAGNOSIS — Z79.891 ENCOUNTER FOR LONG-TERM OPIATE ANALGESIC USE: ICD-10-CM

## 2019-01-29 DIAGNOSIS — M54.2 CERVICALGIA: ICD-10-CM

## 2019-01-29 DIAGNOSIS — M47.812 CERVICAL SPONDYLOSIS WITHOUT MYELOPATHY: Primary | ICD-10-CM

## 2019-01-29 DIAGNOSIS — R51.9 GENERALIZED HEADACHES: ICD-10-CM

## 2019-01-29 DIAGNOSIS — Q98.4 KLINEFELTER SYNDROME: ICD-10-CM

## 2019-01-29 DIAGNOSIS — R51.9 BILATERAL HEADACHES: ICD-10-CM

## 2019-01-29 PROCEDURE — 99213 OFFICE O/P EST LOW 20 MIN: CPT

## 2019-01-29 PROCEDURE — 99213 OFFICE O/P EST LOW 20 MIN: CPT | Performed by: NURSE PRACTITIONER

## 2019-01-29 ASSESSMENT — ENCOUNTER SYMPTOMS: BLURRED VISION: 0

## 2019-02-27 ENCOUNTER — HOSPITAL ENCOUNTER (OUTPATIENT)
Dept: PAIN MANAGEMENT | Age: 34
Discharge: HOME | End: 2019-02-27
Payer: MEDICAID

## 2019-02-27 VITALS
OXYGEN SATURATION: 98 % | WEIGHT: 285 LBS | DIASTOLIC BLOOD PRESSURE: 70 MMHG | SYSTOLIC BLOOD PRESSURE: 110 MMHG | BODY MASS INDEX: 35.43 KG/M2 | TEMPERATURE: 98.2 F | HEART RATE: 65 BPM | HEIGHT: 75 IN | RESPIRATION RATE: 18 BRPM

## 2019-02-27 DIAGNOSIS — Q98.4 KLINEFELTER SYNDROME: ICD-10-CM

## 2019-02-27 DIAGNOSIS — G43.519 INTRACTABLE PERSISTENT MIGRAINE AURA WITHOUT CEREBRAL INFARCTION AND WITHOUT STATUS MIGRAINOSUS: ICD-10-CM

## 2019-02-27 DIAGNOSIS — R51.9 GENERALIZED HEADACHES: Primary | ICD-10-CM

## 2019-02-27 DIAGNOSIS — M54.2 CERVICALGIA: ICD-10-CM

## 2019-02-27 DIAGNOSIS — Z79.899 ENCOUNTER FOR MEDICATION MANAGEMENT: ICD-10-CM

## 2019-02-27 DIAGNOSIS — G43.919 INTRACTABLE MIGRAINE WITHOUT STATUS MIGRAINOSUS, UNSPECIFIED MIGRAINE TYPE: ICD-10-CM

## 2019-02-27 DIAGNOSIS — M47.812 CERVICAL SPONDYLOSIS WITHOUT MYELOPATHY: ICD-10-CM

## 2019-02-27 DIAGNOSIS — Z79.891 ENCOUNTER FOR LONG-TERM OPIATE ANALGESIC USE: ICD-10-CM

## 2019-02-27 DIAGNOSIS — R51.9 BILATERAL HEADACHES: ICD-10-CM

## 2019-02-27 PROCEDURE — 99213 OFFICE O/P EST LOW 20 MIN: CPT

## 2019-02-27 PROCEDURE — 99214 OFFICE O/P EST MOD 30 MIN: CPT | Performed by: PAIN MEDICINE

## 2019-02-27 RX ORDER — DIVALPROEX SODIUM 250 MG/1
TABLET, DELAYED RELEASE ORAL
Refills: 0 | COMMUNITY
Start: 2019-02-07 | End: 2020-11-20

## 2019-02-27 ASSESSMENT — PAIN SCALES - GENERAL: PAINLEVEL_OUTOF10: 7

## 2019-02-27 ASSESSMENT — PAIN DESCRIPTION - ONSET: ONSET: ON-GOING

## 2019-02-27 ASSESSMENT — PAIN DESCRIPTION - FREQUENCY: FREQUENCY: CONTINUOUS

## 2019-02-27 ASSESSMENT — PAIN DESCRIPTION - LOCATION: LOCATION: HEAD

## 2019-02-27 ASSESSMENT — PAIN DESCRIPTION - PROGRESSION: CLINICAL_PROGRESSION: GRADUALLY WORSENING

## 2019-02-27 ASSESSMENT — PAIN - FUNCTIONAL ASSESSMENT: PAIN_FUNCTIONAL_ASSESSMENT: PREVENTS OR INTERFERES SOME ACTIVE ACTIVITIES AND ADLS

## 2019-02-27 ASSESSMENT — PAIN DESCRIPTION - PAIN TYPE: TYPE: CHRONIC PAIN

## 2019-02-27 ASSESSMENT — PAIN DESCRIPTION - ORIENTATION: ORIENTATION: LEFT;RIGHT

## 2019-02-27 ASSESSMENT — PAIN DESCRIPTION - DESCRIPTORS: DESCRIPTORS: ACHING

## 2019-02-27 ASSESSMENT — ENCOUNTER SYMPTOMS
PHOTOPHOBIA: 1
SINUS PAIN: 1
GASTROINTESTINAL NEGATIVE: 1
WHEEZING: 1
SINUS PRESSURE: 1
ALLERGIC/IMMUNOLOGIC NEGATIVE: 1

## 2019-02-28 ASSESSMENT — ENCOUNTER SYMPTOMS
NAUSEA: 0
VOMITING: 0
EYE WATERING: 0
CHOKING: 0
BLURRED VISION: 0
ABDOMINAL PAIN: 0
EYE DISCHARGE: 0
EYE PAIN: 1
BACK PAIN: 0
EYE ITCHING: 0
COUGH: 0

## 2019-03-20 ENCOUNTER — HOSPITAL ENCOUNTER (OUTPATIENT)
Dept: PAIN MANAGEMENT | Age: 34
Discharge: HOME | End: 2019-03-20
Payer: MEDICAID

## 2019-03-20 VITALS
SYSTOLIC BLOOD PRESSURE: 113 MMHG | HEIGHT: 75 IN | OXYGEN SATURATION: 98 % | TEMPERATURE: 98.1 F | WEIGHT: 290 LBS | HEART RATE: 72 BPM | RESPIRATION RATE: 16 BRPM | BODY MASS INDEX: 36.06 KG/M2 | DIASTOLIC BLOOD PRESSURE: 63 MMHG

## 2019-03-20 DIAGNOSIS — Q98.4 KLINEFELTER SYNDROME: ICD-10-CM

## 2019-03-20 DIAGNOSIS — G43.519 INTRACTABLE PERSISTENT MIGRAINE AURA WITHOUT CEREBRAL INFARCTION AND WITHOUT STATUS MIGRAINOSUS: Primary | ICD-10-CM

## 2019-03-20 DIAGNOSIS — M54.2 CERVICALGIA: ICD-10-CM

## 2019-03-20 DIAGNOSIS — M47.812 CERVICAL SPONDYLOSIS WITHOUT MYELOPATHY: ICD-10-CM

## 2019-03-20 DIAGNOSIS — R51.9 BILATERAL HEADACHES: ICD-10-CM

## 2019-03-20 DIAGNOSIS — G43.919 INTRACTABLE MIGRAINE WITHOUT STATUS MIGRAINOSUS, UNSPECIFIED MIGRAINE TYPE: ICD-10-CM

## 2019-03-20 DIAGNOSIS — Z79.899 ENCOUNTER FOR MEDICATION MANAGEMENT: ICD-10-CM

## 2019-03-20 DIAGNOSIS — Z79.891 ENCOUNTER FOR LONG-TERM OPIATE ANALGESIC USE: ICD-10-CM

## 2019-03-20 DIAGNOSIS — R51.9 GENERALIZED HEADACHES: ICD-10-CM

## 2019-03-20 PROCEDURE — 99213 OFFICE O/P EST LOW 20 MIN: CPT | Performed by: NURSE PRACTITIONER

## 2019-03-20 PROCEDURE — 99213 OFFICE O/P EST LOW 20 MIN: CPT

## 2019-03-20 RX ORDER — BUPRENORPHINE 5 UG/H
1 PATCH TRANSDERMAL WEEKLY
Qty: 4 PATCH | Refills: 0 | Status: SHIPPED | OUTPATIENT
Start: 2019-04-01 | End: 2019-05-23 | Stop reason: SDUPTHER

## 2019-03-20 ASSESSMENT — ENCOUNTER SYMPTOMS
SHORTNESS OF BREATH: 1
GASTROINTESTINAL NEGATIVE: 1
PHOTOPHOBIA: 1

## 2019-04-26 ENCOUNTER — HOSPITAL ENCOUNTER (OUTPATIENT)
Dept: PAIN MANAGEMENT | Age: 34
Discharge: HOME | End: 2019-04-26
Payer: MEDICAID

## 2019-04-26 VITALS
HEART RATE: 67 BPM | OXYGEN SATURATION: 98 % | HEIGHT: 75 IN | RESPIRATION RATE: 16 BRPM | DIASTOLIC BLOOD PRESSURE: 91 MMHG | SYSTOLIC BLOOD PRESSURE: 138 MMHG | BODY MASS INDEX: 36.68 KG/M2 | WEIGHT: 295 LBS | TEMPERATURE: 97.9 F

## 2019-04-26 DIAGNOSIS — Z79.891 ENCOUNTER FOR LONG-TERM OPIATE ANALGESIC USE: ICD-10-CM

## 2019-04-26 DIAGNOSIS — M54.2 CERVICALGIA: ICD-10-CM

## 2019-04-26 DIAGNOSIS — G43.519 INTRACTABLE PERSISTENT MIGRAINE AURA WITHOUT CEREBRAL INFARCTION AND WITHOUT STATUS MIGRAINOSUS: Primary | ICD-10-CM

## 2019-04-26 DIAGNOSIS — R51.9 BILATERAL HEADACHES: ICD-10-CM

## 2019-04-26 DIAGNOSIS — R51.9 GENERALIZED HEADACHES: ICD-10-CM

## 2019-04-26 DIAGNOSIS — G43.919 INTRACTABLE MIGRAINE WITHOUT STATUS MIGRAINOSUS, UNSPECIFIED MIGRAINE TYPE: ICD-10-CM

## 2019-04-26 DIAGNOSIS — M47.812 CERVICAL SPONDYLOSIS WITHOUT MYELOPATHY: ICD-10-CM

## 2019-04-26 DIAGNOSIS — Q98.4 KLINEFELTER SYNDROME: ICD-10-CM

## 2019-04-26 PROCEDURE — 99213 OFFICE O/P EST LOW 20 MIN: CPT

## 2019-04-26 PROCEDURE — 99213 OFFICE O/P EST LOW 20 MIN: CPT | Performed by: NURSE PRACTITIONER

## 2019-04-26 PROCEDURE — 80307 DRUG TEST PRSMV CHEM ANLYZR: CPT

## 2019-04-26 ASSESSMENT — ENCOUNTER SYMPTOMS
EYES NEGATIVE: 1
GASTROINTESTINAL NEGATIVE: 1
RESPIRATORY NEGATIVE: 1

## 2019-04-26 NOTE — PROGRESS NOTES
[facility] PROGRESS NOTE      Patient here today to review Medication Agreement    Chief Complaint:headache      HPI: He c/o right frontal headache since last visit. His headache is unchanged. He has headaches for 18 years. .   No changes in vision, he is on nucynta   and Butrans patch. His sleep is good. No Ed visits. Migraine    This is a chronic problem. The current episode started more than 1 year ago. The problem occurs constantly. The problem has been unchanged. The pain is located in the right unilateral and frontal region. The pain quality is similar to prior headaches. Quality: piercing. The pain is at a severity of 6/10. The symptoms are aggravated by bright light. He has tried cold packs and darkened room (pain medication) for the symptoms. The treatment provided moderate relief. Patient denies any new neurological symptoms. No bowel or bladder incontinence, no weakness, and no falling. Treatment goals:  Functional status:pain to be manageable         Aberrancy:   Any alcoholic beverages  no     Any illegal drugs   no        Analgesia:pain 6        Adverse  Effects :tired     ADL;s :activity low            Pill count: appropriate   18 nucynta    Morphine equivalent dose as reported on [facility]RS: 60.12  Attestation: The Prescription Monitoring Report for this patient was reviewed today. (KASSANDRA Ascencio - CNP)  Chronic Pain Routine Monitoring: Random urine drug screen sent today., Obtaining appropriate analgesic effect of treatment., No signs of potential drug abuse or diversion identified: otherwise, see note documentation KASSANDRA Ascencio - CNP)  Chronic Pain > 80 MEDD: Obtained or confirmed a written medication contract was on file. KASSANDRA Ascencio - CNP)  Review ofOARRS does not show any aberrant prescription behavior. Medication is helping the patient stay active. Patient denies any side effects and reports adequate analgesia.  No sign of misuse/abuse.               When was thelast UDS:    7-10-18         Was the UDS appropriate:  yes        Record/Diagnostics Review:       As above, I did review the imaging  7/13/2018 11:53 AM - Kash Gottlieb Incoming Lab Results From [facility]      Component Results      Component Value Ref Range & Units Status Collected Lab   Pain Management Drug Panel Interp, Urine Inconsistent    Final 07/10/2018  3:00 PM [facility]   (NOTE)   ________________________________________________________________   DRUGS EXPECTED:   BUTRANS (BUPRENORPHINE) [7/9/18]   Chelsea Socks (TAPENTADOL) [7/10/18]   ________________________________________________________________   CONSISTENT with medications provided:   Chelsea Socks (TAPENTADOL) : based on tapentadol, tapentadol-o-sulfate   ________________________________________________________________   INCONSISTENT with medications provided:   BUTRANS (BUPRENORPHINE) : based on the absence of buprenorphine   and metabolites   ________________________________________________________________   INTERPRETIVE INFORMATION: Pain Mgt Abel, Mass Spec/EMIT, Ur,                            Interp   Interpretation depends on accuracy and completeness of patient   medication information submitted by client. 6-Acetylmorphine, Ur Not Detected    Final 07/10/2018  3:00 PM [facility]   7-Aminoclonazepam, Urine Not Detected    Final 07/10/2018  3:00 PM [facility]   Alpha-OH-Alpraz, Urine Not Detected    Final 07/10/2018  3:00 PM [facility]   Alprazolam, Urine Not Detected    Final 07/10/2018  3:00 PM [facility]   Amphetamines, urine Not Detected    Final 07/10/2018  3:00 PM [facility]   Barbiturates, Ur Not Detected    Final 07/10/2018  3:00 PM [facility]   Benzoylecgonine, Ur Not Detected    Final 07/10/2018  3:00 PM [facility]   Buprenorphine Urine Not Detected    Final 07/10/2018  3:00 PM [facility]   Carisoprodol, Ur Not Detected    Final 07/10/2018  3:00 PM [facility]   (NOTE)   The carisoprodol immunoassay has cross-reactivity to carisoprodol   and meprobamate. Clonazepam, Urine Not Detected    Final 07/10/2018  3:00 PM [facility]   Codeine, Urine Not Detected    Final 07/10/2018  3:00 PM [facility]   MDA, Ur Not Detected    Final 07/10/2018  3:00 PM [facility]   Diazepam, Urine Not Detected    Final 07/10/2018  3:00 PM [facility]   Ethyl Glucuronide Ur Not Detected    Final 07/10/2018  3:00 PM [facility]   Fentanyl, Ur Not Detected    Final 07/10/2018  3:00 PM [facility]   Hydrocodone, Urine Not Detected    Final 07/10/2018  3:00 PM [facility]   Hydromorphone, Urine Not Detected    Final 07/10/2018  3:00 PM [facility]   Lorazepam, Urine Not Detected    Final 07/10/2018  3:00 PM [facility]   Marijuana Metab, Ur Not Detected    Final 07/10/2018  3:00 PM [facility]   MDEA, CINDY, Ur Not Detected    Final 07/10/2018  3:00 PM [facility]   MDMA, Urine Not Detected    Final 07/10/2018  3:00 PM [facility]   Meperidine Metab, Ur Not Detected    Final 07/10/2018  3:00 PM [facility]   Methadone, Urine Not Detected    Final 07/10/2018  3:00 PM [facility]   Methamphetamine, Urine Not Detected    Final 07/10/2018  3:00 PM [facility]   Methylphenidate Not Detected    Final 07/10/2018  3:00 PM [facility]   Midazolam, Urine Not Detected    Final 07/10/2018  3:00 PM [facility]   Morphine Urine Not Detected    Final 07/10/2018  3:00 PM [facility]   Norbuprenorphine, Urine Not Detected    Final 07/10/2018  3:00 PM [facility]   Nordiazepam, Urine Not Detected    Final 07/10/2018  3:00 PM [facility]   Norfentanyl, Urine Not Detected    Final 07/10/2018  3:00 PM [facility]   NORHYDROCODONE, URINE Not Detected    Final 07/10/2018  3:00 PM [facility]   Noroxycodone, Urine Not Detected    Final 07/10/2018  3:00 PM [facility]   NOROXYMORPHONE, URINE Not Detected    Final 07/10/2018  3:00 PM [facility]   Oxazepam, Urine Not Detected    Final 07/10/2018  3:00 PM [facility]   Oxycodone Urine Not Detected    Final 07/10/2018  3:00 PM [facility]   Oxymorphone, Urine Not Detected    Final 07/10/2018  3:00 PM [facility]   PCP, Urine Not Detected    Final 07/10/2018  3:00 PM [facility]   Phentermine, Ur Not Detected    Final 07/10/2018  3:00 PM [facility]   Propoxyphene, Urine Not Detected    Final 07/10/2018  3:00 PM [facility]   Tapentadol-O-Sulfate, Urine Present    Final 07/10/2018  3:00 PM [facility]   Tapentadol, Urine Present    Final 07/10/2018  3:00 PM [facility]   Temazepam, Urine Not Detected    Final 07/10/2018  3:00 PM [facility]   Tramadol, Urine Not Detected    Final 07/10/2018  3:00 PM [facility]   Zolpidem, Urine Not Detected    Final 07/10/2018  3:00 PM [facility]   Drugs Expected, Ur     Final 07/10/2018  3:00 PM [address], PLACED 7/9/18, NUCYNTA LAST DOSE 1PM 7/10/18    Creatinine, Ur 341.0  20.0 - 400.0 mg/dL Final 07/10/2018  3:00 PM [facility]   Pain Mgt Drug Panel, Hi Res, Ur See Below    Final 07/10/2018  3:00 PM [facility]   (NOTE)   Methodology: Qualitative Enzyme Immunoassay and Qualitative Liquid   Chromatography-Time of Flight-Mass Spectrometry or Tandem Mass   Spectrometry, Quantitative Spectrophotometry   The absence of expected drug(s) and/or drug metabolite(s) may   indicate non-compliance, inappropriate timing of specimen   collection relative to drug administration, poor drug absorption,   diluted/adulterated urine, or limitations of testing. The   concentration must be greater than or equal to the cutoff to be   reported as present.  If specific drug concentrations are   required, contact the laboratory within two weeks of specimen   collection to request quantification by a second analytical   technique. Interpretive questions should be directed to the   laboratory. Results based on immunoassay detection that do not match clinical   expectations should be   interpreted with caution. Confirmatory testing by mass   spectrometry for immunoassay-based results is available, if   ordered within two weeks of specimen collection. Additional   charges apply. For medical purposes only; not valid for forensic use. This test was developed and its performance characteristics   determined by [facility]. The U.[place]  Food and Drug   Administration has not approved or cleared this test; however, FDA   clearance or approval is not currently required for clinical use. The results are not intended to be used as the sole means for   clinical diagnosis or patient management decisions. EER Hi Res Interp Ur See Note    Final 07/10/2018  3:00 PM [facility]   (NOTE)   Access [facility] Enhanced Report using either link below:   -Direct access: [email or web address]   -Enter Username, Password: [email or web address]   Username: [email or web address]   Password: [email or web address]   Performed by [address] [id number]             Past Medical History:   Diagnosis Date    Asthma     Depression     Headache(784.0)     Klinefelter syndrome        Past Surgical History:   Procedure Laterality Date    UPPP UVULOPALATOPHARYGOPLASTY         Allergies   Allergen Reactions    Food      Bananas, eggs, milk, cherries    Seasonal      Hay fever           Current Outpatient Medications:     [START ON 5/2/2019] tapentadol (NUCYNTA) 50 MG TABS, Take 1 tablet by mouth every 8 hours for 30 days. , Disp: 90 tablet, Rfl: 0    divalproex (DEPAKOTE) 250 MG DR tablet, TAKE ONE TABLET BY MOUTH TWICE DAILY, Disp: , Rfl: 0    NONFORMULARY, New rx testosterone implant, Disp: , Rfl:     nadolol (CORGARD) 40 MG tablet, 1/4-1/2 AS DIRECTED FOR TREMOR, Disp: , Rfl: 5    loratadine-pseudoephedrine (CLARITIN-D 12 HOUR) 5-120 MG per extended release tablet, Take 1 tablet by mouth 2 times daily, Disp: , Rfl:     sertraline (ZOLOFT) 100 MG tablet, TAKE ONE TABLET BY MOUTH ONCE A DAY EVERY MORNING, Disp: , Rfl: 5    VENTOLIN  (90 Base) MCG/ACT inhaler, TWO SPRAYS BY MOUTH FOUR TIMES A DAY AS NEEDED, Disp: , Rfl: 5    baclofen (LIORESAL) 10 MG tablet, Take 10 mg by mouth 3 times daily, Disp: , Rfl:     cyclobenzaprine (FLEXERIL) 10 MG tablet, , Disp: , Rfl:     Family History   Problem Relation Age of Onset    High Blood Pressure Father     Mental Illness Sister     High Blood Pressure Paternal Grandmother     Cancer Paternal Grandmother     Heart Disease Paternal Grandfather     High Blood Pressure Paternal Grandfather     Stroke Paternal Grandfather        Social History     Socioeconomic History    Marital status: Single     Spouse name: Not on file    Number of children: Not on file    Years of education: Not on file    Highest education level: Not on file   Occupational History    Occupation: disability   Social Needs    Financial resource strain: Not on file    Food insecurity:     Worry: Not on file     Inability: Not on file    Transportation needs:     Medical: Not on file     Non-medical: Not on file   Tobacco Use    Smoking status: Never Smoker    Smokeless tobacco: Never Used   Substance and Sexual Activity    Alcohol use: Yes     Comment: rare , hard cider or wine    Drug use: No    Sexual activity: Never   Lifestyle    Physical activity:     Days per week: Not on file     Minutes per session: Not on file    Stress: Not on file   Relationships    Social connections:     Talks on phone: Not on file     Gets together: Not on file     Attends Church service: Not on file     Active member of club or organization: Not on file     Attends meetings of clubs or organizations: Not on file     Relationship status: Not on file    Intimate partner violence:     Fear of current or ex partner: Not on file     Emotionally abused: Not on file     Physically abused: Not on file     Forced sexual activity: Not on file   Other Topics Concern    Not on file   Social History Narrative    Not on file       Review of Systems:  Review of Systems   Constitution: Negative. HENT: Negative. Eyes: Negative. Cardiovascular: Negative. Respiratory: Negative. Endocrine: Negative. Skin: Negative. Musculoskeletal: Negative. Gastrointestinal: Negative. Genitourinary: Negative. Neurological: Positive for headaches. Psychiatric/Behavioral: Positive for depression. Manageable         Physical Exam:  BP (!) 138/91   Pulse 67   Temp 97.9 °F (36.6 °C) (Oral)   Resp 16   Ht 6' 3\" (1.905 m)   Wt 295 lb (133.8 kg)   SpO2 98%   BMI 36.87 kg/m²     Physical Exam   Constitutional: He appears well-developed. HENT:   Head: Normocephalic. Eyes: EOM are normal.   Neck: Normal range of motion. Neck supple. Pulmonary/Chest: Effort normal.   Musculoskeletal:        Cervical back: Normal.   Neurological: He is alert. He has normal strength. Reflex Scores:       Tricep reflexes are 2+ on the right side and 2+ on the left side. Bicep reflexes are 2+ on the right side and 2+ on the left side. Brachioradialis reflexes are 2+ on the right side and 2+ on the left side. Skin: Skin is warm, dry and intact. Psychiatric: He has a normal mood and affect.  His speech is normal and behavior is normal. Judgment and thought content normal. Cognition and memory are normal.         Assessment:      Problem List Items Addressed This Visit     Klinefelter syndrome    Relevant Medications    tapentadol (NUCYNTA) 50 MG TABS (Start on 5/2/2019)    Intractable migraine without status migrainosus - Primary    Relevant Medications    tapentadol (NUCYNTA) 50 MG TABS (Start on 5/2/2019)    Generalized headaches    Relevant Medications    tapentadol (NUCYNTA) 50 MG TABS (Start on 5/2/2019)    Encounter for long-term opiate analgesic use    Relevant Medications    tapentadol (NUCYNTA) 50 MG TABS (Start on 5/2/2019)    Cervicalgia    Relevant Medications    tapentadol (NUCYNTA) 50 MG TABS (Start on 5/2/2019)    Cervical spondylosis without myelopathy    Relevant Medications    tapentadol (NUCYNTA) 50 MG TABS (Start on 5/2/2019)    Bilateral headaches    Relevant Medications    tapentadol (NUCYNTA) 50 MG TABS (Start on 5/2/2019)            Treatment Plan:  DISCUSSION: Treatment options discussed withpatient and all questions answered drugs  or any medications that can depress breathing  Patient is also advised to tell us if there is any changes in their medications from other physicians.     UDT today  Had nucynta 1 pm today, butrans patch was taken off 4-15-19  Will let  know, he needs prior auth on Butrans patch    TREATMENT OPTIONS:   Medication agreement updated  Return in 4 weeks  Medication Agreement Requirements Met  Continue Opioid therapy  Script written for nucynta  Follow up appointment made

## 2019-05-03 LAB
6-ACETYLMORPHINE, UR: NOT DETECTED
7-AMINOCLONAZEPAM, URINE: NOT DETECTED
ALPHA-OH-ALPRAZ, URINE: NOT DETECTED
ALPRAZOLAM, URINE: NOT DETECTED
AMPHETAMINES, URINE: NOT DETECTED
BARBITURATES, URINE: NOT DETECTED
BENZOYLECGONINE, UR: NOT DETECTED
BUPRENORPHINE URINE: NOT DETECTED
CARISOPRODOL, UR: NOT DETECTED
CLONAZEPAM, URINE: NOT DETECTED
CODEINE, URINE: NOT DETECTED
CREATININE URINE: 162.5 MG/DL (ref 20–400)
DIAZEPAM, URINE: NOT DETECTED
DRUGS EXPECTED, UR: NORMAL
EER HI RES INTERP UR: NORMAL
ETHYL GLUCURONIDE UR: NOT DETECTED
FENTANYL URINE: NOT DETECTED
HYDROCODONE, URINE: NOT DETECTED
HYDROMORPHONE, URINE: NOT DETECTED
LORAZEPAM, URINE: NOT DETECTED
MARIJUANA METAB, UR: NOT DETECTED
MDA, UR: NOT DETECTED
MDEA, EVE, UR: NOT DETECTED
MDMA URINE: NOT DETECTED
MEPERIDINE METAB, UR: NOT DETECTED
METHADONE, URINE: NOT DETECTED
METHAMPHETAMINE, URINE: NOT DETECTED
METHYLPHENIDATE: NOT DETECTED
MIDAZOLAM, URINE: NOT DETECTED
MORPHINE URINE: NOT DETECTED
NORBUPRENORPHINE, URINE: NOT DETECTED
NORDIAZEPAM, URINE: NOT DETECTED
NORFENTANYL, URINE: NOT DETECTED
NORHYDROCODONE, URINE: NOT DETECTED
NOROXYCODONE, URINE: NOT DETECTED
NOROXYMORPHONE, URINE: NOT DETECTED
OXAZEPAM, URINE: NOT DETECTED
OXYCODONE URINE: NOT DETECTED
OXYMORPHONE, URINE: NOT DETECTED
PAIN MANAGEMENT DRUG PANEL INTERP, URINE: NORMAL
PAIN MGT DRUG PANEL, HI RES, UR: NORMAL
PCP,URINE: NOT DETECTED
PHENTERMINE, UR: NOT DETECTED
PROPOXYPHENE, URINE: NOT DETECTED
TAPENTADOL, URINE: PRESENT
TAPENTADOL-O-SULFATE, URINE: PRESENT
TEMAZEPAM, URINE: NOT DETECTED
TRAMADOL, URINE: NOT DETECTED
ZOLPIDEM, URINE: NOT DETECTED

## 2019-05-23 ENCOUNTER — HOSPITAL ENCOUNTER (OUTPATIENT)
Dept: PAIN MANAGEMENT | Age: 34
Discharge: HOME | End: 2019-05-23
Payer: MEDICAID

## 2019-05-23 VITALS
TEMPERATURE: 98.2 F | WEIGHT: 300 LBS | OXYGEN SATURATION: 99 % | HEART RATE: 73 BPM | BODY MASS INDEX: 37.3 KG/M2 | DIASTOLIC BLOOD PRESSURE: 38 MMHG | HEIGHT: 75 IN | RESPIRATION RATE: 16 BRPM | SYSTOLIC BLOOD PRESSURE: 128 MMHG

## 2019-05-23 DIAGNOSIS — G43.519 INTRACTABLE PERSISTENT MIGRAINE AURA WITHOUT CEREBRAL INFARCTION AND WITHOUT STATUS MIGRAINOSUS: ICD-10-CM

## 2019-05-23 DIAGNOSIS — Q98.4 KLINEFELTER SYNDROME: ICD-10-CM

## 2019-05-23 DIAGNOSIS — G43.919 INTRACTABLE MIGRAINE WITHOUT STATUS MIGRAINOSUS, UNSPECIFIED MIGRAINE TYPE: ICD-10-CM

## 2019-05-23 DIAGNOSIS — M47.812 CERVICAL SPONDYLOSIS WITHOUT MYELOPATHY: ICD-10-CM

## 2019-05-23 DIAGNOSIS — R51.9 BILATERAL HEADACHES: ICD-10-CM

## 2019-05-23 DIAGNOSIS — R51.9 GENERALIZED HEADACHES: ICD-10-CM

## 2019-05-23 DIAGNOSIS — Z79.891 ENCOUNTER FOR LONG-TERM OPIATE ANALGESIC USE: Primary | ICD-10-CM

## 2019-05-23 DIAGNOSIS — M54.2 CERVICALGIA: ICD-10-CM

## 2019-05-23 PROCEDURE — 99213 OFFICE O/P EST LOW 20 MIN: CPT | Performed by: NURSE PRACTITIONER

## 2019-05-23 PROCEDURE — 99213 OFFICE O/P EST LOW 20 MIN: CPT

## 2019-05-23 RX ORDER — BUPRENORPHINE 5 UG/H
1 PATCH TRANSDERMAL WEEKLY
Qty: 4 PATCH | Refills: 0 | Status: SHIPPED | OUTPATIENT
Start: 2019-05-24 | End: 2019-08-21 | Stop reason: SDUPTHER

## 2019-05-23 ASSESSMENT — ENCOUNTER SYMPTOMS
GASTROINTESTINAL NEGATIVE: 1
PHOTOPHOBIA: 1

## 2019-05-23 NOTE — PROGRESS NOTES
. . [facility] [address] PROGRESS NOTE      Patient here today to review Medication Agreement    Chief Complaint:  headache      HPI: He c/o headache which is like his previous headaches, he has some neck pain. He has long standing history of migraines. He is managing with butrans patch and nucynta. He uses the butrans patch 2 weeks on and 2 weeks off. Sleep is good. He has had no Ed visits. Patient's mother asking if prior auth obtained on Butrans patch. I told her I will have  come talk to her, Last visit  informed of need  for prior auth. Headache    This is a chronic problem. The current episode started more than 1 year ago. The problem occurs constantly. The problem has been unchanged. The pain is located in the parietal, occipital, bilateral and frontal region. The pain does not radiate. The pain quality is similar to prior headaches. The quality of the pain is described as pulsating. The pain is at a severity of 7/10. The pain is moderate. Associated symptoms include phonophobia and photophobia. The symptoms are aggravated by bright light and noise. He has tried darkened room, oral narcotics and cold packs for the symptoms. The treatment provided mild relief. His past medical history is significant for migraine headaches. Patient denies any new neurological symptoms. No bowel or bladder incontinence, no weakness, and no falling. Treatment goals:  Functional status:pain to be manageable         Aberrancy:   Any alcoholic beverages  no     Any illegal drugs   no        Analgesia:pain7      Adverse  Effects :tired     ADL;s :activity low                Pill count: appropriate  #27 nucynta tabs left, states will need to apply  butrans patch on 5-27-19    Morphine equivalent dose as reported on [facility]RS: 60.12  Attestation: The Prescription Monitoring Report for this patient was reviewed today.  Hima Cuadra, APRN - CNP)  Chronic Pain Routine Monitoring: No signs of potential drug abuse or diversion identified: otherwise, see note documentation, Obtaining appropriate analgesic effect of treatment. (KASSANDRA Hall CNP)  Chronic Pain > 80 MEDD: Obtained or confirmed a written medication contract was on file. KASSANDRA Hall CNP)  Review of[place]ARRS does not show any aberrant prescription behavior. Medication is helping the patient stay active. Patient denies any side effects and reports adequate analgesia. No sign of misuse/abuse. When was thelast UDS:  4-26-19           Was the UDS appropriate:yes      Record/Diagnostics Review:      As above, I did review the imaging    5/3/2019  2:51 PM - Bull, Kash Incoming Lab Results From [facility]     Component Value Ref Range & Units Status Collected Lab   Pain Management Drug Panel Interp, Urine Consistent   Final 04/26/2019  2:45 PM [facility]   (NOTE)   ________________________________________________________________   DRUGS EXPECTED:   TAPENTADOL [4/26/2019]   BUPRENORPHINE [4/15/19]   ________________________________________________________________   CONSISTENT with medications provided:   TAPENTADOL : based on tapentadol, tapentadol-o-sulfate   BUPRENORPHINE : based on the absence of buprenorphine and   metabolites   ________________________________________________________________   INTERPRETIVE INFORMATION: Pain Mgt Abel, [place] Spec/EMIT, Ur,                            Interp   Interpretation depends on accuracy and completeness of patient   medication information submitted by client.     6-Acetylmorphine, Ur Not Detected   Final 04/26/2019  2:45 PM [facility]   7-Aminoclonazepam, Urine Not Detected   Final 04/26/2019  2:45 PM [facility]   Alpha-OH-Alpraz, Urine Not Detected   Final 04/26/2019  2:45 PM [facility]   Alprazolam, Urine Not Detected   Final 04/26/2019  2:45 PM [facility]   Amphetamines, urine Not Detected   Final 04/26/2019  2:45 PM [facility]   Barbiturates, Ur Not Detected   Final 04/26/2019  2:45 PM [facility]   Benzoylecgonine, Ur Not Detected Final 04/26/2019  2:45 PM [facility]   Buprenorphine Urine Not Detected   Final 04/26/2019  2:45 PM [facility]   Carisoprodol, Ur Not Detected   Final 04/26/2019  2:45 PM [facility]   (NOTE)   The carisoprodol immunoassay has cross-reactivity to carisoprodol   and meprobamate.     Clonazepam, Urine Not Detected   Final 04/26/2019  2:45 PM [facility]   Codeine, Urine Not Detected   Final 04/26/2019  2:45 PM [facility]   MDA, Ur Not Detected   Final 04/26/2019  2:45 PM [facility]   Diazepam, Urine Not Detected   Final 04/26/2019  2:45 PM [facility]   Ethyl Glucuronide Ur Not Detected   Final 04/26/2019  2:45 PM [facility]   Fentanyl, Ur Not Detected   Final 04/26/2019  2:45 PM [facility]   Hydrocodone, Urine Not Detected   Final 04/26/2019  2:45 PM [facility]   Hydromorphone, Urine Not Detected   Final 04/26/2019  2:45 PM [facility]   Lorazepam, Urine Not Detected   Final 04/26/2019  2:45 PM [facility]   Marijuana Metab, Ur Not Detected   Final 04/26/2019  2:45 PM [facility]   MDEA, CINDY, Ur Not Detected   Final 04/26/2019  2:45 PM [facility]   MDMA, Urine Not Detected   Final 04/26/2019  2:45 PM [facility]   Meperidine Metab, Ur Not Detected   Final 04/26/2019  2:45 PM [facility]   Methadone, Urine Not Detected   Final 04/26/2019  2:45 PM [facility]   Methamphetamine, Urine Not Detected   Final 04/26/2019  2:45 PM [facility]   Methylphenidate Not Detected   Final 04/26/2019  2:45 PM [facility]   Midazolam, Urine Not Detected   Final 04/26/2019  2:45 PM [facility]   Morphine Urine Not Detected   Final 04/26/2019  2:45 PM [facility]   Norbuprenorphine, Urine Not Detected   Final 04/26/2019  2:45 PM [facility]   Nordiazepam, Urine Not Detected   Final 04/26/2019  2:45 PM [facility]   Norfentanyl, Urine Not Detected   Final 04/26/2019  2:45 PM [facility]   NORHYDROCODONE, URINE Not Detected   Final 04/26/2019  2:45 PM [facility]   Noroxycodone, Urine Not Detected   Final 04/26/2019  2:45 PM [facility]   NOROXYMORPHONE, URINE Not Detected   Final 04/26/2019  2:45 PM [facility]   Oxazepam, Urine Not Detected   Final 04/26/2019  2:45 PM [facility]   Oxycodone Urine Not Detected   Final charges apply. For medical purposes only; not valid for forensic use. This test was developed and its performance characteristics   determined by Darin Walter. The U.S. Food and Drug   Administration has not approved or cleared this test; however, FDA   clearance or approval is not currently required for clinical use. The results are not intended to be used as the sole means for   clinical diagnosis or patient management decisions. EER Hi Res Interp Ur See Note   Final 2019  2:45 PM [facility]   (NOTE)   Access [facility] Enhanced Report using either link below:   -Direct access: [email or web address]   -Enter Username, Password: [email or web address]   Username: [email or web address]   Password: [email or web address]   Performed by Darin Walter,   [place] [id number]   www. Magaly Portillo MD, Lab. Director           Past Medical History:   Diagnosis Date    Asthma     Depression     Headache(784.0)     Klinefelter syndrome        Past Surgical History:   Procedure Laterality Date    UPPP UVULOPALATOPHARYGOPLASTY         Allergies   Allergen Reactions    Food      Bananas, eggs, milk, cherries    Seasonal      Hay fever           Current Outpatient Medications:     [START ON 2019] tapentadol (NUCYNTA) 50 MG TABS, Take 1 tablet by mouth every 8 hours for 30 days. , Disp: 90 tablet, Rfl: 0    [START ON 2019] buprenorphine (BUTRANS) 5 MCG/HR PTWK, Place 1 patch onto the skin once a week for 4 doses. , Disp: 4 patch, Rfl: 0    divalproex (DEPAKOTE) 250 MG DR tablet, TAKE ONE TABLET BY MOUTH TWICE DAILY, Disp: , Rfl: 0    NONFORMULARY, New rx testosterone implant, Disp: , Rfl:     nadolol (CORGARD) 40 MG tablet, /-1/2 AS DIRECTED FOR TREMOR, Disp: , Rfl: 5    loratadine-pseudoephedrine (CLARITIN-D 12 HOUR) 5-120 MG per extended release tablet, Take 1 tablet by mouth 2 times daily, Disp: , Rfl:     sertraline (ZOLOFT) 100 MG tablet, TAKE ONE TABLET BY MOUTH ONCE A DAY EVERY MORNING, Disp: , Rfl: 5    VENTOLIN  (90 Base) MCG/ACT inhaler, TWO SPRAYS BY MOUTH FOUR TIMES A DAY AS NEEDED, Disp: , Rfl: 5    baclofen (LIORESAL) 10 MG tablet, Take 10 mg by mouth 3 times daily, Disp: , Rfl:     cyclobenzaprine (FLEXERIL) 10 MG tablet, , Disp: , Rfl:     Family History   Problem Relation Age of Onset    High Blood Pressure Father     Mental Illness Sister     High Blood Pressure Paternal Grandmother     Cancer Paternal Grandmother     Heart Disease Paternal Grandfather     High Blood Pressure Paternal Grandfather     Stroke Paternal Grandfather        Social History     Socioeconomic History    Marital status: Single     Spouse name: Not on file    Number of children: Not on file    Years of education: Not on file    Highest education level: Not on file   Occupational History    Occupation: disability   Social Needs    Financial resource strain: Not on file    Food insecurity:     Worry: Not on file     Inability: Not on file    Transportation needs:     Medical: Not on file     Non-medical: Not on file   Tobacco Use    Smoking status: Never Smoker    Smokeless tobacco: Never Used   Substance and Sexual Activity    Alcohol use: Yes     Comment: rare , hard cider or wine    Drug use: No    Sexual activity: Never   Lifestyle    Physical activity:     Days per week: Not on file     Minutes per session: Not on file    Stress: Not on file   Relationships    Social connections:     Talks on phone: Not on file     Gets together: Not on file     Attends Gnosticism service: Not on file     Active member of club or organization: Not on file     Attends meetings of clubs or organizations: Not on file     Relationship status: Not on file    Intimate partner violence:     Fear of current or ex partner: Not on file     Emotionally abused: Not on file     Physically abused: Not on file     Forced sexual activity: Not on file   Other Topics Concern    Not on file   Social History Narrative    Not on file       Review of Systems:  Review of Systems   Constitution: Negative. HENT: Negative. Eyes: Positive for photophobia. Cardiovascular: Negative. Endocrine: Negative. Hematologic/Lymphatic: Negative. Skin: Negative. Musculoskeletal: Negative. Gastrointestinal: Negative. Genitourinary: Negative. Neurological: Positive for headaches. Psychiatric/Behavioral: Positive for depression. Managing         Physical Exam:  BP (!) 128/38   Pulse 73   Temp 98.2 °F (36.8 °C) (Oral)   Resp 16   Ht 6' 3\" (1.905 m)   Wt 300 lb (136.1 kg)   SpO2 99%   BMI 37.50 kg/m²     Physical Exam   Constitutional: He appears well-developed. overweight   HENT:   Head: Normocephalic. Eyes: EOM are normal.   Neck: Normal range of motion. Neck supple. Pulmonary/Chest: Effort normal.   Musculoskeletal:        Cervical back: Normal.   Neurological: He is alert. He has normal strength. Reflex Scores:       Tricep reflexes are 2+ on the right side and 2+ on the left side. Bicep reflexes are 2+ on the right side and 2+ on the left side. Brachioradialis reflexes are 2+ on the right side and 2+ on the left side. Skin: Skin is warm, dry and intact.    Psychiatric: His speech is normal and behavior is normal. Judgment and thought content normal. Cognition and memory are normal.         Assessment:      Problem List Items Addressed This Visit     Klinefelter syndrome    Relevant Medications    tapentadol (NUCYNTA) 50 MG TABS (Start on 5/31/2019)    Intractable migraine without status migrainosus    Relevant Medications    tapentadol (NUCYNTA) 50 MG TABS (Start on 5/31/2019)    buprenorphine (BUTRANS) 5 MCG/HR PTWK (Start on 5/24/2019)    Generalized headaches    Relevant Medications    tapentadol (NUCYNTA) 50 MG TABS (Start on 5/31/2019)    buprenorphine (Jeris Cure) 5 MCG/HR PTWK (Start on 5/24/2019)    Encounter for long-term opiate analgesic use - Primary    Relevant Medications    tapentadol (NUCYNTA) 50 MG TABS (Start on 5/31/2019)    Cervicalgia    Relevant Medications    tapentadol (NUCYNTA) 50 MG TABS (Start on 5/31/2019)    buprenorphine (Marisol Floss) 5 MCG/HR PTWK (Start on 5/24/2019)    Cervical spondylosis without myelopathy    Relevant Medications    tapentadol (NUCYNTA) 50 MG TABS (Start on 5/31/2019)    buprenorphine (Marisol Floss) 5 MCG/HR PTWK (Start on 5/24/2019)    Bilateral headaches    Relevant Medications    tapentadol (NUCYNTA) 50 MG TABS (Start on 5/31/2019)    buprenorphine (Mraisol Floss) 5 MCG/HR PTWK (Start on 5/24/2019)            Treatment Plan:  DISCUSSION: Treatment options discussed withpatient and all questions answered to patient's satisfaction. Possible side effects, risk of tolerance and or dependence and alternative treatments discussed    Obtaining appropriate analgesic effect of treatment   No signs of potential drug abuse or diversion identified    [x] Ill effects of being on chronic pain medications such as sleep disturbances, respiratory depression, hormonal changes, withdrawal symptoms, chronic opioid dependence and tolerance as well as risk of taking opioids with Benzodiazepines and taking opioids along with alcohol,  werediscussed with patient. I had asked the patient to minimize medication use and utilize pain medications only for uncontrolled rest pain or pain with exertional activities. I advised patient not to self-escalate painmedications without consulting with us. At each of patient's future visits we will try to taper pain medications, while adjusting the adjunct medications, and re-evaluating for Physical Therapy to improve spinal andjoint strength. We will continue to have discussions to decrease pain medications as tolerated. Counseled patient on effects their pain medication and /or their medical condition mayhave on their  ability to drive or operate machinery.  Instructed not to drive or operate machinery if drowsy     I also discussed with the patient regarding the dangers of combining narcotic pain medication with tranquilizers, alcohol or illegal drugs or taking the medication any way other than prescribed. The dangers were discussed  including respiratory depression and death. Patient was told to tell  all  physicians regarding the medications he is getting from pain clinic. Patient is warned not to take any unprescribed medications over-the-countermedications that can depress breathing . Patient is advised to talk to the pharmacist or physicians if planning to take any over-the-counter medications before  takeing them. Patient is strongly advised to avoid tranquilizers or  relaxants, illegal drugs  or any medications that can depress breathing  Patient is also advised to tell us if there is any changes in their medications from other physicians.         TREATMENT OPTIONS:     Return in 4 weeks  Medication Agreement Requirements Met  Continue Opioid therapy  Script written for  nucynta and butrans patch  Follow up appointment made

## 2019-06-20 ENCOUNTER — HOSPITAL ENCOUNTER (OUTPATIENT)
Dept: PAIN MANAGEMENT | Age: 34
Discharge: HOME | End: 2019-06-20
Payer: MEDICAID

## 2019-06-20 VITALS
OXYGEN SATURATION: 95 % | TEMPERATURE: 98.3 F | RESPIRATION RATE: 16 BRPM | HEIGHT: 75 IN | HEART RATE: 82 BPM | WEIGHT: 300 LBS | BODY MASS INDEX: 37.3 KG/M2

## 2019-06-20 DIAGNOSIS — G43.919 INTRACTABLE MIGRAINE WITHOUT STATUS MIGRAINOSUS, UNSPECIFIED MIGRAINE TYPE: ICD-10-CM

## 2019-06-20 DIAGNOSIS — Z79.891 ENCOUNTER FOR LONG-TERM OPIATE ANALGESIC USE: ICD-10-CM

## 2019-06-20 DIAGNOSIS — M54.2 CERVICALGIA: ICD-10-CM

## 2019-06-20 DIAGNOSIS — M47.812 CERVICAL SPONDYLOSIS WITHOUT MYELOPATHY: ICD-10-CM

## 2019-06-20 DIAGNOSIS — G43.519 INTRACTABLE PERSISTENT MIGRAINE AURA WITHOUT CEREBRAL INFARCTION AND WITHOUT STATUS MIGRAINOSUS: Primary | ICD-10-CM

## 2019-06-20 DIAGNOSIS — R51.9 GENERALIZED HEADACHES: ICD-10-CM

## 2019-06-20 DIAGNOSIS — R51.9 BILATERAL HEADACHES: ICD-10-CM

## 2019-06-20 PROCEDURE — 99213 OFFICE O/P EST LOW 20 MIN: CPT | Performed by: NURSE PRACTITIONER

## 2019-06-20 PROCEDURE — 99213 OFFICE O/P EST LOW 20 MIN: CPT

## 2019-06-20 ASSESSMENT — ENCOUNTER SYMPTOMS
PHOTOPHOBIA: 1
SHORTNESS OF BREATH: 1
GASTROINTESTINAL NEGATIVE: 1

## 2019-06-20 NOTE — PROGRESS NOTES
[facility] [address] PROGRESS NOTE      Patient here today to review Medication Agreement    Chief Complaint: headache      HPI: He c/o headache which are chronic. He was seen at [facility] and seen by neurologists in the past. He hd been on morphine and methadone in 2017 and had been on topamax. He tried aimovig for 2 months which did not improve his headaches. . He has been having headaches since age since age 17. Butrans patch helps improve the headaches. He has been on Butrans patch  for almost 3 years. There have been no discrepancies in his pill counts and Urine Drug Tests. This is a chronic condition. He c/o headache which is like his previous headaches, he has some neck pain. He has long standing history of migraines. He is managing with butrans patch and nucynta. He uses the butrans patch 2 weeks on and 2 weeks off. Sleep is good. He has had no Ed visits. He will need prior auth again on Butrans patch for next refill. _Prior treatments included  Acupressure was in [address] [place] [address] several years ( 15) years ago. Chiropractic treatment a few different Chiropractor visits, with neck adjustments x 15 years. Medical management of headaches include : Tramadol ( current) alternating with Butrans patches 5 mcg/hr, and Nucynta   at 50 mg upto every 6 hours  Migraine    This is a chronic problem. The current episode started more than 1 year ago. The problem occurs constantly. The problem has been unchanged. The pain is located in the frontal region. The pain does not radiate. The pain quality is similar to prior headaches. The quality of the pain is described as aching. The pain is at a severity of 6/10. Associated symptoms include phonophobia and photophobia. The symptoms are aggravated by bright light and noise. He has tried darkened room, oral narcotics, NSAIDs, beta blockers and antidepressants for the symptoms. The treatment provided mild relief.  His past medical history is significant for migraines in the family. Patient denies any new neurological symptoms. No bowel or bladder incontinence, no weakness, and no falling. Treatment goals:  Functional status:pain to be manageable         Aberrancy:   Any alcoholic beverages  no     Any illegal drugs   no        Analgesia:pain 6        Adverse  Effects  Drowsiness,        ADL;s :walks sometimes                 Pill count: appropriate    Morphine equivalent dose as reported on OARRS: 60.12     Review ofOARRS does not show any aberrant prescription behavior. Medication is helping the patient stay active. Patient denies any side effects and reports adequate analgesia. No sign of misuse/abuse. When was thelast UDS:  4-15-19           Was the UDS appropriate:yes      Record/Diagnostics Review:      As above, I did review the imaging    5/3/2019  2:51 PM - Bull, Kash Incoming Lab Results From [facility]     Component Value Ref Range & Units Status Collected Lab   Pain Management Drug Panel Interp, Urine Consistent   Final 04/26/2019  2:45 PM [facility]   (NOTE)   ________________________________________________________________   DRUGS EXPECTED:   TAPENTADOL [4/26/2019]   BUPRENORPHINE [4/15/19]   ________________________________________________________________   CONSISTENT with medications provided:   TAPENTADOL : based on tapentadol, tapentadol-o-sulfate   BUPRENORPHINE : based on the absence of buprenorphine and   metabolites   ________________________________________________________________   INTERPRETIVE INFORMATION: Pain Mgt Abel, Mass Spec/EMIT, Ur,                            Interp   Interpretation depends on accuracy and completeness of patient   medication information submitted by client.     6-Acetylmorphine, Ur Not Detected   Final 04/26/2019  2:45 PM [facility]   7-Aminoclonazepam, Urine Not Detected   Final 04/26/2019  2:45 PM [facility]   Alpha-OH-Alpraz, Urine Not Detected   Final 04/26/2019  2:45 PM [facility]   Alprazolam, Urine Not Detected   Final 04/26/2019  2:45 PM [facility]   Amphetamines, urine Not Detected   Final 04/26/2019  2:45 PM [facility]   Barbiturates, Ur Not Detected   Final 04/26/2019  2:45 PM [facility]   Benzoylecgonine, Ur Not Detected   Final 04/26/2019  2:45 PM [facility]   Buprenorphine Urine Not Detected   Final 04/26/2019  2:45 PM [facility]   Carisoprodol, Ur Not Detected   Final 04/26/2019  2:45 PM [facility]   (NOTE)   The carisoprodol immunoassay has cross-reactivity to carisoprodol   and meprobamate.     Clonazepam, Urine Not Detected   Final 04/26/2019  2:45 PM [facility]   Codeine, Urine Not Detected   Final 04/26/2019  2:45 PM [facility]   MDA, Ur Not Detected   Final 04/26/2019  2:45 PM [facility]   Diazepam, Urine Not Detected   Final 04/26/2019  2:45 PM [facility]   Ethyl Glucuronide Ur Not Detected   Final 04/26/2019  2:45 PM [facility]   Fentanyl, Ur Not Detected   Final 04/26/2019  2:45 PM [facility]   Hydrocodone, Urine Not Detected   Final 04/26/2019  2:45 PM [facility]   Hydromorphone, Urine Not Detected   Final 04/26/2019  2:45 PM [facility]   Lorazepam, Urine Not Detected   Final 04/26/2019  2:45 PM [facility]   Marijuana Metab, Ur Not Detected   Final 04/26/2019  2:45 PM [facility]   MDEA, CINDY, Ur Not Detected   Final 04/26/2019  2:45 PM [facility]   MDMA, Urine Not Detected   Final 04/26/2019  2:45 PM [facility]   Meperidine Metab, Ur Not Detected   Final 04/26/2019  2:45 PM [facility]   Methadone, Urine Not Detected   Final 04/26/2019  2:45 PM [facility]   Methamphetamine, Urine Not Detected   Final 04/26/2019  2:45 PM [facility]   Methylphenidate Not Detected   Final 04/26/2019  2:45 PM [facility]   Midazolam, Urine Not Detected   Final 04/26/2019  2:45 PM [facility]   Morphine Urine Not Detected   Final 04/26/2019  2:45 PM [facility]   Norbuprenorphine, Urine Not Detected   Final 04/26/2019  2:45 PM [facility]   Nordiazepam, Urine Not Detected   Final 04/26/2019  2:45 PM [facility]   Norfentanyl, Urine Not Detected   Final 04/26/2019  2:45 PM [facility]   NORHYDROCODONE, URINE Not Detected   Final 04/26/2019  2:45 PM [facility]   Noroxycodone, Urine Not Detected   Final 04/26/2019  2:45 PM [facility]   NOROXYMORPHONE, URINE Not Detected   Final 04/26/2019  2:45 PM [facility]   Oxazepam, Urine Not Detected   Final 04/26/2019  2:45 PM [facility]   Oxycodone Urine Not Detected   Final 04/26/2019  2:45 PM [facility]   Oxymorphone, Urine Not Detected   Final 04/26/2019  2:45 PM [facility]   PCP, Urine Not Detected   Final 04/26/2019  2:45 PM [facility]   Phentermine, Ur Not Detected   Final 04/26/2019  2:45 PM [facility]   Propoxyphene, Urine Not Detected   Final 04/26/2019  2:45 PM [facility]   Tapentadol-O-Sulfate, Urine Present   Final 04/26/2019  2:45 PM [facility]   Tapentadol, Urine Present   Final 04/26/2019  2:45 PM [facility]   Temazepam, Urine Not Detected   Final 04/26/2019  2:45 PM [facility]   Tramadol, Urine Not Detected   Final 04/26/2019  2:45 PM [facility]   Zolpidem, Urine Not Detected   Final 04/26/2019  2:45 PM [facility]   Drugs Expected, Ur   Final 04/26/2019  2:45  [facility]   TAPENTADOL (NUCYNTA) ON 4/15/19, BURENORPHINE ON 4/25/19 AT 1 PM    Creatinine, Ur 162.5  20.0 - 400.0 mg/dL Final 04/26/2019  2:45 PM [facility]   Pain Mgt Drug Panel, Hi Res, Ur See Below   Final 04/26/2019  2:45 PM [facility]   (NOTE)   Methodology: Qualitative Enzyme Immunoassay and Qualitative Liquid   Chromatography-Time of Flight-Mass Spectrometry or Tandem Mass   Spectrometry, Quantitative Spectrophotometry   The absence of expected drug(s) and/or drug metabolite(s) may   indicate non-compliance, inappropriate timing of specimen   collection relative to drug administration, poor drug absorption,   diluted/adulterated urine, or limitations of testing. The   concentration must be greater than or equal to the cutoff to be   reported as present.  If specific drug concentrations are   required, contact the laboratory within two weeks of specimen   collection to request quantification by a second analytical   technique. Interpretive questions should be directed to the   laboratory.    Results based on immunoassay detection that do not match clinical   expectations should be   interpreted with caution. Confirmatory testing by mass   spectrometry for immunoassay-based results is available, if   ordered within two weeks of specimen collection. Additional   charges apply. For medical purposes only; not valid for forensic use. This test was developed and its performance characteristics   determined by [facility]. The U.S. Food and Drug   Administration has not approved or cleared this test; however, FDA   clearance or approval is not currently required for clinical use. The results are not intended to be used as the sole means for   clinical diagnosis or patient management decisions. EER Hi Res Interp Ur See Note   Final 2019  2:45 PM [facility]   (NOTE)   Access [facility] Enhanced Report using either link below:   -Direct access: [email or web address]   -Enter Username, Password: [email or web address]   Username: [email or web address]   Password: [email or web address]   Performed by Darin Walter,   [place] [phone]   www. Lilly Burns MD, Lab. Director            Past Medical History:   Diagnosis Date    Asthma     Depression     Headache(784.0)     Klinefelter syndrome        Past Surgical History:   Procedure Laterality Date    UPPP UVULOPALATOPHARYGOPLASTY         Allergies   Allergen Reactions    Food      Bananas, eggs, milk, cherries    Seasonal      Hay fever           Current Outpatient Medications:     Pseudoeph-Doxylamine-DM-APAP (NYQUIL PO), Take by mouth, Disp: , Rfl:     [START ON 2019] tapentadol (NUCYNTA) 50 MG TABS, Take 1 tablet by mouth every 8 hours for 30 days. , Disp: 90 tablet, Rfl: 0    divalproex (DEPAKOTE) 250 MG DR tablet, TAKE ONE TABLET BY MOUTH TWICE DAILY, Disp: , Rfl: 0    nadolol (CORGARD) 40 MG tablet, /-1/2 AS DIRECTED FOR TREMOR, Disp: , Rfl: 5    loratadine-pseudoephedrine (CLARITIN-D 12 HOUR) 5-120 MG per extended release tablet, Take 1 tablet by mouth 2 times daily, Disp: , Rfl:     sertraline (ZOLOFT) 100 MG tablet, TAKE ONE TABLET BY MOUTH ONCE A DAY EVERY MORNING, Disp: , Rfl: 5    NONFORMULARY, New rx testosterone implant, Disp: , Rfl:     VENTOLIN  (90 Base) MCG/ACT inhaler, TWO SPRAYS BY MOUTH FOUR TIMES A DAY AS NEEDED, Disp: , Rfl: 5    baclofen (LIORESAL) 10 MG tablet, Take 10 mg by mouth 3 times daily, Disp: , Rfl:     cyclobenzaprine (FLEXERIL) 10 MG tablet, , Disp: , Rfl:     Family History   Problem Relation Age of Onset    High Blood Pressure Father     Mental Illness Sister     High Blood Pressure Paternal Grandmother     Cancer Paternal Grandmother     Heart Disease Paternal Grandfather     High Blood Pressure Paternal Grandfather     Stroke Paternal Grandfather        Social History     Socioeconomic History    Marital status: Single     Spouse name: Not on file    Number of children: Not on file    Years of education: Not on file    Highest education level: Not on file   Occupational History    Occupation: disability   Social Needs    Financial resource strain: Not on file    Food insecurity:     Worry: Not on file     Inability: Not on file    Transportation needs:     Medical: Not on file     Non-medical: Not on file   Tobacco Use    Smoking status: Never Smoker    Smokeless tobacco: Never Used   Substance and Sexual Activity    Alcohol use: Yes     Comment: rare , hard cider or wine    Drug use: No    Sexual activity: Never   Lifestyle    Physical activity:     Days per week: Not on file     Minutes per session: Not on file    Stress: Not on file   Relationships    Social connections:     Talks on phone: Not on file     Gets together: Not on file     Attends Nondenominational service: Not on file     Active member of club or organization: Not on file     Attends meetings of clubs or organizations: Not on file     Relationship status: Not on file    Intimate partner violence:     Fear of current or ex partner: getting from pain clinic. Patient is warned not to take any unprescribed medications over-the-countermedications that can depress breathing . Patient is advised to talk to the pharmacist or physicians if planning to take any over-the-counter medications before  takeing them. Patient is strongly advised to avoid tranquilizers or  relaxants, illegal drugs  or any medications that can depress breathing  Patient is also advised to tell us if there is any changes in their medications from other physicians.      He will need script for Butrans patch at next visit      TREATMENT OPTIONS:     Return in 4 weeks  Medication Agreement Requirements Met  Continue Opioid therapy  Script written for  nucynta  Follow up appointment made

## 2019-07-18 ENCOUNTER — HOSPITAL ENCOUNTER (OUTPATIENT)
Dept: PAIN MANAGEMENT | Age: 34
Discharge: HOME | End: 2019-07-18
Payer: MEDICAID

## 2019-07-18 VITALS — HEART RATE: 82 BPM | SYSTOLIC BLOOD PRESSURE: 129 MMHG | RESPIRATION RATE: 16 BRPM | DIASTOLIC BLOOD PRESSURE: 74 MMHG

## 2019-07-18 DIAGNOSIS — Z79.891 ENCOUNTER FOR LONG-TERM OPIATE ANALGESIC USE: ICD-10-CM

## 2019-07-18 DIAGNOSIS — M54.2 CERVICALGIA: ICD-10-CM

## 2019-07-18 DIAGNOSIS — G43.919 INTRACTABLE MIGRAINE WITHOUT STATUS MIGRAINOSUS, UNSPECIFIED MIGRAINE TYPE: ICD-10-CM

## 2019-07-18 DIAGNOSIS — R51.9 BILATERAL HEADACHES: ICD-10-CM

## 2019-07-18 DIAGNOSIS — M47.812 CERVICAL SPONDYLOSIS WITHOUT MYELOPATHY: ICD-10-CM

## 2019-07-18 DIAGNOSIS — G43.519 INTRACTABLE PERSISTENT MIGRAINE AURA WITHOUT CEREBRAL INFARCTION AND WITHOUT STATUS MIGRAINOSUS: Primary | ICD-10-CM

## 2019-07-18 DIAGNOSIS — R51.9 GENERALIZED HEADACHES: ICD-10-CM

## 2019-07-18 PROCEDURE — 99213 OFFICE O/P EST LOW 20 MIN: CPT | Performed by: NURSE PRACTITIONER

## 2019-07-18 PROCEDURE — 99213 OFFICE O/P EST LOW 20 MIN: CPT

## 2019-07-18 ASSESSMENT — ENCOUNTER SYMPTOMS
CONSTIPATION: 0
SHORTNESS OF BREATH: 0
BACK PAIN: 1
COUGH: 0
PHOTOPHOBIA: 1

## 2019-07-18 NOTE — PROGRESS NOTES
file    Years of education: Not on file    Highest education level: Not on file   Occupational History    Occupation: disability   Social Needs    Financial resource strain: Not on file    Food insecurity:     Worry: Not on file     Inability: Not on file    Transportation needs:     Medical: Not on file     Non-medical: Not on file   Tobacco Use    Smoking status: Never Smoker    Smokeless tobacco: Never Used   Substance and Sexual Activity    Alcohol use: Yes     Comment: rare , hard cider or wine    Drug use: No    Sexual activity: Never   Lifestyle    Physical activity:     Days per week: Not on file     Minutes per session: Not on file    Stress: Not on file   Relationships    Social connections:     Talks on phone: Not on file     Gets together: Not on file     Attends Orthodox service: Not on file     Active member of club or organization: Not on file     Attends meetings of clubs or organizations: Not on file     Relationship status: Not on file    Intimate partner violence:     Fear of current or ex partner: Not on file     Emotionally abused: Not on file     Physically abused: Not on file     Forced sexual activity: Not on file   Other Topics Concern    Not on file   Social History Narrative    Not on file       Review of Systems:  Review of Systems   Constitution: Negative for chills and fever. Eyes: Positive for photophobia. Cardiovascular: Negative for chest pain. Respiratory: Negative for cough and shortness of breath. Musculoskeletal: Positive for back pain and neck pain. Gastrointestinal: Negative for constipation. Physical Exam:  /74   Pulse 82   Resp 16     Physical Exam   Constitutional: He is oriented to person, place, and time. Cardiovascular: Normal rate. Pulmonary/Chest: Effort normal.   Musculoskeletal: Normal range of motion. Neurological: He is alert and oriented to person, place, and time. Skin: Skin is warm and dry.

## 2019-08-08 ENCOUNTER — TELEPHONE (OUTPATIENT)
Dept: PAIN MANAGEMENT | Age: 34
End: 2019-08-08

## 2019-08-08 NOTE — TELEPHONE ENCOUNTER
Received communication from [facility] regarding appeal about Pharmacy Services, information given to  Pastora Jaime, Provider listed for this appeal was Usman Hernandes

## 2019-08-21 ENCOUNTER — HOSPITAL ENCOUNTER (OUTPATIENT)
Dept: PAIN MANAGEMENT | Age: 34
Discharge: HOME | End: 2019-08-21
Payer: MEDICAID

## 2019-08-21 VITALS
OXYGEN SATURATION: 96 % | BODY MASS INDEX: 37.3 KG/M2 | SYSTOLIC BLOOD PRESSURE: 113 MMHG | RESPIRATION RATE: 16 BRPM | HEIGHT: 75 IN | DIASTOLIC BLOOD PRESSURE: 75 MMHG | HEART RATE: 71 BPM | TEMPERATURE: 97.6 F | WEIGHT: 300 LBS

## 2019-08-21 DIAGNOSIS — Z79.899 ENCOUNTER FOR MEDICATION MANAGEMENT: ICD-10-CM

## 2019-08-21 DIAGNOSIS — G43.919 INTRACTABLE MIGRAINE WITHOUT STATUS MIGRAINOSUS, UNSPECIFIED MIGRAINE TYPE: ICD-10-CM

## 2019-08-21 DIAGNOSIS — Q98.4 KLINEFELTER SYNDROME: ICD-10-CM

## 2019-08-21 DIAGNOSIS — R51.9 GENERALIZED HEADACHES: ICD-10-CM

## 2019-08-21 DIAGNOSIS — Z79.891 ENCOUNTER FOR LONG-TERM OPIATE ANALGESIC USE: ICD-10-CM

## 2019-08-21 DIAGNOSIS — M47.812 CERVICAL SPONDYLOSIS WITHOUT MYELOPATHY: ICD-10-CM

## 2019-08-21 DIAGNOSIS — R51.9 BILATERAL HEADACHES: ICD-10-CM

## 2019-08-21 DIAGNOSIS — M54.2 CERVICALGIA: ICD-10-CM

## 2019-08-21 DIAGNOSIS — G43.519 INTRACTABLE PERSISTENT MIGRAINE AURA WITHOUT CEREBRAL INFARCTION AND WITHOUT STATUS MIGRAINOSUS: Primary | ICD-10-CM

## 2019-08-21 PROCEDURE — 99214 OFFICE O/P EST MOD 30 MIN: CPT | Performed by: PAIN MEDICINE

## 2019-08-21 PROCEDURE — 99214 OFFICE O/P EST MOD 30 MIN: CPT

## 2019-08-21 RX ORDER — BUPRENORPHINE 5 UG/H
1 PATCH TRANSDERMAL WEEKLY
Qty: 4 PATCH | Refills: 0 | Status: SHIPPED | OUTPATIENT
Start: 2019-08-21 | End: 2019-09-13 | Stop reason: SDUPTHER

## 2019-08-21 RX ORDER — BUPRENORPHINE 5 UG/H
1 PATCH TRANSDERMAL WEEKLY
Qty: 4 PATCH | Refills: 0 | Status: SHIPPED | OUTPATIENT
Start: 2019-08-21 | End: 2019-08-21 | Stop reason: SDUPTHER

## 2019-08-21 RX ORDER — BUPRENORPHINE 5 UG/H
1 PATCH TRANSDERMAL WEEKLY
Status: DISCONTINUED | OUTPATIENT
Start: 2019-08-21 | End: 2019-08-21 | Stop reason: CLARIF

## 2019-08-21 ASSESSMENT — PAIN DESCRIPTION - ONSET: ONSET: ON-GOING

## 2019-08-21 ASSESSMENT — PAIN DESCRIPTION - PAIN TYPE: TYPE: CHRONIC PAIN

## 2019-08-21 ASSESSMENT — PAIN DESCRIPTION - FREQUENCY: FREQUENCY: CONTINUOUS

## 2019-08-21 ASSESSMENT — PAIN DESCRIPTION - DESCRIPTORS: DESCRIPTORS: ACHING

## 2019-08-21 ASSESSMENT — PAIN DESCRIPTION - ORIENTATION: ORIENTATION: RIGHT;LEFT

## 2019-08-21 ASSESSMENT — PAIN SCALES - GENERAL: PAINLEVEL_OUTOF10: 6

## 2019-08-21 ASSESSMENT — PAIN DESCRIPTION - LOCATION: LOCATION: HEAD

## 2019-08-21 ASSESSMENT — PAIN - FUNCTIONAL ASSESSMENT: PAIN_FUNCTIONAL_ASSESSMENT: PREVENTS OR INTERFERES SOME ACTIVE ACTIVITIES AND ADLS

## 2019-08-21 ASSESSMENT — ENCOUNTER SYMPTOMS
RESPIRATORY NEGATIVE: 1
ALLERGIC/IMMUNOLOGIC NEGATIVE: 1

## 2019-08-21 ASSESSMENT — PAIN DESCRIPTION - PROGRESSION: CLINICAL_PROGRESSION: NOT CHANGED

## 2019-08-21 NOTE — PROGRESS NOTES
Attends meetings of clubs or organizations: None     Relationship status: None    Intimate partner violence:     Fear of current or ex partner: None     Emotionally abused: None     Physically abused: None     Forced sexual activity: None   Other Topics Concern    None   Social History Narrative    None      reports that he does not use drugs. REVIEW OF SYSTEMS:  Review of Systems   Constitutional: Negative. Negative for appetite change, fatigue, fever and unexpected weight change. HENT: Positive for ear pain and sinus pressure. Negative for congestion, hearing loss and tinnitus. Eyes: Positive for photophobia. Negative for blurred vision, pain and redness. Respiratory: Negative. Negative for cough and shortness of breath. Cardiovascular: Negative. Negative for chest pain and palpitations. Gastrointestinal: Positive for nausea. Negative for abdominal pain, constipation and vomiting. Endocrine: Negative. Negative for cold intolerance and polyphagia. Genitourinary: Negative for dysuria and hematuria. Musculoskeletal: Negative. Negative for arthralgias, back pain and neck pain. Skin: Negative. Negative for rash. Allergic/Immunologic: Negative. Neurological: Positive for dizziness and headaches. Negative for tingling, tremors and weakness. Photophobia    Hematological: Negative. Does not bruise/bleed easily. Psychiatric/Behavioral: Negative. Negative for sleep disturbance and suicidal ideas. The patient is not nervous/anxious. GENERAL PHYSICAL EXAM:  Vitals: /75   Pulse 71   Temp 97.6 °F (36.4 °C) (Oral)   Resp 16   Ht 6' 3\" (1.905 m)   Wt 300 lb (136.1 kg)   SpO2 96%   BMI 37.50 kg/m² , Body mass index is 37.5 kg/m². Physical Exam   Constitutional: He is oriented to person, place, and time. He appears well-developed and well-nourished. HENT:   Head: Normocephalic and atraumatic. Neck movements are normal in range.   Palpation revealed Moderate. The chart date reviewed include the following: Imaging Reports. Summary of Care. Time spent reviewing with patient the below reports:   Medication safety, Treatment options. Level of diagnosis and management options of this case is multiple: involving the following management options: Interventions as needed, medication management as appropriate, future visits, activity modification, heat/ice as needed, Urine drug screen as required. [x]The patient's questions were answered to the best of my abilities. This note was created using voice recognition software. There may be inaccuracies of transcription  that are inadvertently overlooked prior to the signature. There is any questions about the transcription please contact me. Return in  4 weeks  with  CNP  for further plan of treatment.     Electronically signed by Kaylyn Caraballo MD on 8/22/2019 at 5:33 AM

## 2019-08-21 NOTE — LETTER
To whomever it may concern   This letter is regarding Osman Dumont ;date of birth 1985  Osman Dumont is a patient in this pain clinic of long-standing duration. This patient is prior to coming to this pain clinic was treated by neurologist.  Patient apparently tried to several migraine medications include triptan's beta-blockers anticonvulsants antidepressants and a C GRP antagonists without any improvement. The only medications that were helping are Butrans patch 5 mcg q. 7 days along with Nucynta 50 mg 3 times a day. Patient exhibited no signs of drug abuse or any aberrant behaviors. Patient pain is under control with these medications and was functional.  Apparently for some reason the insurance company does not want to provide him with his medications and patient at present is suffering from severe headaches and is nonfunctional.  Patient's doses are not excessive and he had no signs of abuse in the past.  I am not sure why insurance company does not want to provide him with the medications in spite of our compliance with the state requirements for narcotics. Patient was counseled extensively about medications and the complications and risks associated with the use of narcotics. His doses appear to be not excessive. He never  showed any signs of drug abuse. He also exhausted other forms of treatments which did not help the pain.   Hence at this time I request the insurance company to allow him to have the medications so that he can be more functional.  If you have any questions please do not hesitate to call me in the pain clinic at [facility] phone number is [phone]  Sincerely,  Electronically signed by Alec Feliciano MD on 8/22/2019 at 5:41 AM

## 2019-08-22 ASSESSMENT — ENCOUNTER SYMPTOMS
ABDOMINAL PAIN: 0
CONSTIPATION: 0
EYE WATERING: 0
SHORTNESS OF BREATH: 0
VOMITING: 0
NAUSEA: 1
SINUS PRESSURE: 1
EYE PAIN: 0
BLURRED VISION: 0
EYE REDNESS: 0
BACK PAIN: 0
COUGH: 0
PHOTOPHOBIA: 1

## 2019-09-13 ENCOUNTER — HOSPITAL ENCOUNTER (OUTPATIENT)
Dept: PAIN MANAGEMENT | Age: 34
Discharge: HOME | End: 2019-09-13
Payer: MEDICAID

## 2019-09-13 VITALS
DIASTOLIC BLOOD PRESSURE: 87 MMHG | SYSTOLIC BLOOD PRESSURE: 126 MMHG | TEMPERATURE: 98.5 F | BODY MASS INDEX: 37.3 KG/M2 | HEART RATE: 76 BPM | OXYGEN SATURATION: 97 % | RESPIRATION RATE: 20 BRPM | HEIGHT: 75 IN | WEIGHT: 300 LBS

## 2019-09-13 DIAGNOSIS — M47.812 CERVICAL SPONDYLOSIS WITHOUT MYELOPATHY: ICD-10-CM

## 2019-09-13 DIAGNOSIS — Z79.891 ENCOUNTER FOR LONG-TERM OPIATE ANALGESIC USE: ICD-10-CM

## 2019-09-13 DIAGNOSIS — M54.2 CERVICALGIA: ICD-10-CM

## 2019-09-13 DIAGNOSIS — G43.519 INTRACTABLE PERSISTENT MIGRAINE AURA WITHOUT CEREBRAL INFARCTION AND WITHOUT STATUS MIGRAINOSUS: Primary | ICD-10-CM

## 2019-09-13 DIAGNOSIS — R51.9 BILATERAL HEADACHES: ICD-10-CM

## 2019-09-13 DIAGNOSIS — R51.9 GENERALIZED HEADACHES: ICD-10-CM

## 2019-09-13 DIAGNOSIS — G43.919 INTRACTABLE MIGRAINE WITHOUT STATUS MIGRAINOSUS, UNSPECIFIED MIGRAINE TYPE: ICD-10-CM

## 2019-09-13 PROCEDURE — 99213 OFFICE O/P EST LOW 20 MIN: CPT

## 2019-09-13 PROCEDURE — 99213 OFFICE O/P EST LOW 20 MIN: CPT | Performed by: NURSE PRACTITIONER

## 2019-09-13 RX ORDER — BUPRENORPHINE 5 UG/H
1 PATCH TRANSDERMAL WEEKLY
Qty: 4 PATCH | Refills: 0 | Status: SHIPPED | OUTPATIENT
Start: 2019-09-27 | End: 2019-11-12 | Stop reason: SDUPTHER

## 2019-09-13 RX ORDER — DULOXETIN HYDROCHLORIDE 20 MG/1
CAPSULE, DELAYED RELEASE ORAL
Refills: 2 | COMMUNITY
Start: 2019-09-05

## 2019-09-13 ASSESSMENT — ENCOUNTER SYMPTOMS
RESPIRATORY NEGATIVE: 1
GASTROINTESTINAL NEGATIVE: 1
PHOTOPHOBIA: 1

## 2019-09-13 NOTE — PROGRESS NOTES
stay active. Patient denies any side effects and reports adequate analgesia. No sign of misuse/abuse. When was thelast UDS:  4-26-19           Was the UDS appropriate:    CONSISTENT with medications provided:   TAPENTADOL : based on tapentadol, tapentadol-o-sulfate   BUPRENORPHINE : based on the absence of buprenorphine and   metabolites   Record/Diagnostics Review:      As above, I did review the imaging      5/3/2019  2:51 PM - Bull, Livierpn Incoming Lab Results From [facility]     Component Value Ref Range & Units Status Collected Lab   Pain Management Drug Panel Interp, Urine Consistent   Final 04/26/2019  2:45 PM [facility]   (NOTE)   ________________________________________________________________   DRUGS EXPECTED:   TAPENTADOL [4/26/2019]   BUPRENORPHINE [4/15/19]   ________________________________________________________________   CONSISTENT with medications provided:   TAPENTADOL : based on tapentadol, tapentadol-o-sulfate   BUPRENORPHINE : based on the absence of buprenorphine and   metabolites   ________________________________________________________________   INTERPRETIVE INFORMATION: Pain Mgt Abel, Mass Spec/EMIT, Ur,                            Interp   Interpretation depends on accuracy and completeness of patient   medication information submitted by client.     6-Acetylmorphine, Ur Not Detected   Final 04/26/2019  2:45 PM [facility]   7-Aminoclonazepam, Urine Not Detected   Final 04/26/2019  2:45 PM [facility]   Alpha-OH-Alpraz, Urine Not Detected   Final 04/26/2019  2:45 PM [facility]   Alprazolam, Urine Not Detected   Final 04/26/2019  2:45 PM [facility]   Amphetamines, urine Not Detected   Final 04/26/2019  2:45 PM [facility]   Barbiturates, Ur Not Detected   Final 04/26/2019  2:45 PM [facility]   Benzoylecgonine, Ur Not Detected   Final 04/26/2019  2:45 PM [facility]   Buprenorphine Urine Not Detected   Final 04/26/2019  2:45 PM [facility]   Carisoprodol, Ur Not Detected   Final 04/26/2019  2:45 PM [facility]   (NOTE)   The carisoprodol immunoassay has

## 2019-10-18 ENCOUNTER — HOSPITAL ENCOUNTER (OUTPATIENT)
Dept: PAIN MANAGEMENT | Age: 34
Discharge: HOME | End: 2019-10-18
Payer: MEDICAID

## 2019-10-18 VITALS
WEIGHT: 300 LBS | HEIGHT: 75 IN | DIASTOLIC BLOOD PRESSURE: 82 MMHG | TEMPERATURE: 98.2 F | RESPIRATION RATE: 16 BRPM | SYSTOLIC BLOOD PRESSURE: 134 MMHG | BODY MASS INDEX: 37.3 KG/M2 | HEART RATE: 84 BPM

## 2019-10-18 DIAGNOSIS — R51.9 GENERALIZED HEADACHES: ICD-10-CM

## 2019-10-18 DIAGNOSIS — Z79.891 ENCOUNTER FOR LONG-TERM OPIATE ANALGESIC USE: ICD-10-CM

## 2019-10-18 DIAGNOSIS — M54.2 CERVICALGIA: ICD-10-CM

## 2019-10-18 DIAGNOSIS — Q98.4 KLINEFELTER SYNDROME: Primary | ICD-10-CM

## 2019-10-18 DIAGNOSIS — M47.812 CERVICAL SPONDYLOSIS WITHOUT MYELOPATHY: ICD-10-CM

## 2019-10-18 DIAGNOSIS — G43.519 INTRACTABLE PERSISTENT MIGRAINE AURA WITHOUT CEREBRAL INFARCTION AND WITHOUT STATUS MIGRAINOSUS: ICD-10-CM

## 2019-10-18 DIAGNOSIS — G43.919 INTRACTABLE MIGRAINE WITHOUT STATUS MIGRAINOSUS, UNSPECIFIED MIGRAINE TYPE: ICD-10-CM

## 2019-10-18 DIAGNOSIS — R51.9 BILATERAL HEADACHES: ICD-10-CM

## 2019-10-18 PROCEDURE — 99213 OFFICE O/P EST LOW 20 MIN: CPT | Performed by: NURSE PRACTITIONER

## 2019-10-18 PROCEDURE — 99213 OFFICE O/P EST LOW 20 MIN: CPT

## 2019-10-18 ASSESSMENT — ENCOUNTER SYMPTOMS
GASTROINTESTINAL NEGATIVE: 1
PHOTOPHOBIA: 1
SHORTNESS OF BREATH: 1

## 2019-11-12 ENCOUNTER — HOSPITAL ENCOUNTER (OUTPATIENT)
Dept: PAIN MANAGEMENT | Age: 34
Discharge: HOME | End: 2019-11-12
Payer: MEDICAID

## 2019-11-12 VITALS
TEMPERATURE: 99 F | HEIGHT: 75 IN | BODY MASS INDEX: 37.3 KG/M2 | HEART RATE: 72 BPM | DIASTOLIC BLOOD PRESSURE: 95 MMHG | SYSTOLIC BLOOD PRESSURE: 133 MMHG | RESPIRATION RATE: 20 BRPM | WEIGHT: 300 LBS | OXYGEN SATURATION: 5 %

## 2019-11-12 DIAGNOSIS — G43.919 INTRACTABLE MIGRAINE WITHOUT STATUS MIGRAINOSUS, UNSPECIFIED MIGRAINE TYPE: ICD-10-CM

## 2019-11-12 DIAGNOSIS — Z79.891 ENCOUNTER FOR LONG-TERM OPIATE ANALGESIC USE: ICD-10-CM

## 2019-11-12 DIAGNOSIS — M54.2 CERVICALGIA: ICD-10-CM

## 2019-11-12 DIAGNOSIS — G43.519 INTRACTABLE PERSISTENT MIGRAINE AURA WITHOUT CEREBRAL INFARCTION AND WITHOUT STATUS MIGRAINOSUS: Primary | ICD-10-CM

## 2019-11-12 DIAGNOSIS — R51.9 BILATERAL HEADACHES: ICD-10-CM

## 2019-11-12 DIAGNOSIS — Q98.4 KLINEFELTER SYNDROME: ICD-10-CM

## 2019-11-12 DIAGNOSIS — R51.9 GENERALIZED HEADACHES: ICD-10-CM

## 2019-11-12 DIAGNOSIS — M47.812 CERVICAL SPONDYLOSIS WITHOUT MYELOPATHY: ICD-10-CM

## 2019-11-12 PROCEDURE — 99213 OFFICE O/P EST LOW 20 MIN: CPT | Performed by: NURSE PRACTITIONER

## 2019-11-12 PROCEDURE — 99213 OFFICE O/P EST LOW 20 MIN: CPT

## 2019-11-12 RX ORDER — BUPRENORPHINE 5 UG/H
1 PATCH TRANSDERMAL WEEKLY
Qty: 4 PATCH | Refills: 0 | Status: SHIPPED | OUTPATIENT
Start: 2019-11-21 | End: 2020-01-14 | Stop reason: SDUPTHER

## 2019-11-12 ASSESSMENT — ENCOUNTER SYMPTOMS
SHORTNESS OF BREATH: 1
PHOTOPHOBIA: 1
GASTROINTESTINAL NEGATIVE: 1

## 2019-12-11 ENCOUNTER — HOSPITAL ENCOUNTER (OUTPATIENT)
Dept: PAIN MANAGEMENT | Age: 34
Discharge: HOME | End: 2019-12-11
Payer: MEDICAID

## 2019-12-11 VITALS
WEIGHT: 300 LBS | OXYGEN SATURATION: 96 % | HEART RATE: 73 BPM | BODY MASS INDEX: 37.3 KG/M2 | RESPIRATION RATE: 20 BRPM | SYSTOLIC BLOOD PRESSURE: 108 MMHG | DIASTOLIC BLOOD PRESSURE: 66 MMHG | HEIGHT: 75 IN | TEMPERATURE: 98 F

## 2019-12-11 DIAGNOSIS — G43.519 INTRACTABLE PERSISTENT MIGRAINE AURA WITHOUT CEREBRAL INFARCTION AND WITHOUT STATUS MIGRAINOSUS: Primary | ICD-10-CM

## 2019-12-11 DIAGNOSIS — Z79.891 ENCOUNTER FOR LONG-TERM OPIATE ANALGESIC USE: ICD-10-CM

## 2019-12-11 DIAGNOSIS — R51.9 GENERALIZED HEADACHES: ICD-10-CM

## 2019-12-11 DIAGNOSIS — M47.812 CERVICAL SPONDYLOSIS WITHOUT MYELOPATHY: ICD-10-CM

## 2019-12-11 DIAGNOSIS — G43.919 INTRACTABLE MIGRAINE WITHOUT STATUS MIGRAINOSUS, UNSPECIFIED MIGRAINE TYPE: ICD-10-CM

## 2019-12-11 DIAGNOSIS — M54.2 CERVICALGIA: ICD-10-CM

## 2019-12-11 DIAGNOSIS — R51.9 BILATERAL HEADACHES: ICD-10-CM

## 2019-12-11 PROCEDURE — 99213 OFFICE O/P EST LOW 20 MIN: CPT | Performed by: NURSE PRACTITIONER

## 2019-12-11 PROCEDURE — 99213 OFFICE O/P EST LOW 20 MIN: CPT

## 2019-12-11 ASSESSMENT — ENCOUNTER SYMPTOMS
PHOTOPHOBIA: 1
GASTROINTESTINAL NEGATIVE: 1
SHORTNESS OF BREATH: 1

## 2020-01-14 ENCOUNTER — HOSPITAL ENCOUNTER (OUTPATIENT)
Dept: PAIN MANAGEMENT | Age: 35
Discharge: HOME | End: 2020-01-14
Payer: MEDICAID

## 2020-01-14 VITALS
RESPIRATION RATE: 20 BRPM | WEIGHT: 300 LBS | HEIGHT: 75 IN | OXYGEN SATURATION: 96 % | HEART RATE: 78 BPM | TEMPERATURE: 98.4 F | BODY MASS INDEX: 37.3 KG/M2

## 2020-01-14 PROCEDURE — 99213 OFFICE O/P EST LOW 20 MIN: CPT

## 2020-01-14 PROCEDURE — 99213 OFFICE O/P EST LOW 20 MIN: CPT | Performed by: NURSE PRACTITIONER

## 2020-01-14 RX ORDER — BUPRENORPHINE 5 UG/H
1 PATCH TRANSDERMAL WEEKLY
Qty: 4 PATCH | Refills: 0 | Status: SHIPPED | OUTPATIENT
Start: 2020-02-03 | End: 2020-03-05

## 2020-01-14 RX ORDER — BUPRENORPHINE 5 UG/H
1 PATCH TRANSDERMAL WEEKLY
COMMUNITY
End: 2020-01-14

## 2020-01-14 ASSESSMENT — ENCOUNTER SYMPTOMS
GASTROINTESTINAL NEGATIVE: 1
PHOTOPHOBIA: 1
RESPIRATORY NEGATIVE: 1

## 2020-01-14 NOTE — PROGRESS NOTES
60.12  Periodic Controlled Substance Monitoring: Possible medication side effects, risk of tolerance/dependence & alternative treatments discussed., No signs of potential drug abuse or diversion identified. , Assessed functional status., Obtaining appropriate analgesic effect of treatment. KASSANDRA Lopez - CNP)  Review ofOARRS does not show any aberrant prescription behavior. Medication is helping the patient stay active. Patient denies any side effects and reports adequate analgesia. No sign of misuse/abuse.               When was thelast UDS:  4-26-19           Was the UDS appropriate:  CONSISTENT with medications provided:   TAPENTADOL : based on tapentadol, tapentadol-o-sulfate   BUPRENORPHINE : based on the absence of buprenorphine and   metabolites      Record/Diagnostics Review:       As above, I did review the imaging     5/3/2019  2:51 PM - Kash Gottlieb Incoming Lab Results From [facility]      Component Value Ref Range & Units Status Collected Lab   Pain Management Drug Panel Interp, Urine Consistent    Final 04/26/2019  2:45 PM [facility]   (NOTE)   ________________________________________________________________   DRUGS EXPECTED:   TAPENTADOL [4/26/2019]   BUPRENORPHINE [4/15/19]   ________________________________________________________________   CONSISTENT with medications provided:   TAPENTADOL : based on tapentadol, tapentadol-o-sulfate   BUPRENORPHINE : based on the absence of buprenorphine and   metabolites   ________________________________________________________________   INTERPRETIVE INFORMATION: Pain Mgt Abel, Mass Spec/EMIT, Ur,                            Interp   Interpretation depends on accuracy and completeness of patient   medication information submitted by client.     6-Acetylmorphine, Ur Not Detected    Final 04/26/2019  2:45 PM [facility]   7-Aminoclonazepam, Urine Not Detected    Final 04/26/2019  2:45 PM [facility]   Alpha-OH-Alpraz, Urine Not Detected    Final 04/26/2019  2:45 PM [facility]   Alprazolam, Urine Not Detected    Final 04/26/2019  2:45 PM [facility]   Amphetamines, urine Not Detected    Final 04/26/2019  2:45 PM [facility]   Barbiturates, Ur Not Detected    Final 04/26/2019  2:45 PM [facility]   Benzoylecgonine, Ur Not Detected    Final 04/26/2019  2:45 PM [facility]   Buprenorphine Urine Not Detected    Final 04/26/2019  2:45 PM [facility]   Carisoprodol, Ur Not Detected    Final 04/26/2019  2:45 PM [facility]   (NOTE)   The carisoprodol immunoassay has cross-reactivity to carisoprodol   and meprobamate.     Clonazepam, Urine Not Detected    Final 04/26/2019  2:45 PM [facility]   Codeine, Urine Not Detected    Final 04/26/2019  2:45 PM [facility]   MDA, Ur Not Detected    Final 04/26/2019  2:45 PM [facility]   Diazepam, Urine Not Detected    Final 04/26/2019  2:45 PM [facility]   Ethyl Glucuronide Ur Not Detected    Final 04/26/2019  2:45 PM [facility]   Fentanyl, Ur Not Detected    Final 04/26/2019  2:45 PM [facility]   Hydrocodone, Urine Not Detected    Final 04/26/2019  2:45 PM [facility]   Hydromorphone, Urine Not Detected    Final 04/26/2019  2:45 PM [facility]   Lorazepam, Urine Not Detected    Final 04/26/2019  2:45 PM [facility]   Marijuana Metab, Ur Not Detected    Final 04/26/2019  2:45 PM [facility]   MDEA, CINDY, Ur Not Detected    Final 04/26/2019  2:45 PM [facility]   MDMA, Urine Not Detected    Final 04/26/2019  2:45 PM [facility]   Meperidine Metab, Ur Not Detected    Final 04/26/2019  2:45 PM [facility]   Methadone, Urine Not Detected    Final 04/26/2019  2:45 PM [facility]   Methamphetamine, Urine Not Detected    Final 04/26/2019  2:45 PM [facility]   Methylphenidate Not Detected    Final 04/26/2019  2:45 PM [facility]   Midazolam, Urine Not Detected    Final 04/26/2019  2:45 PM [facility]   Morphine Urine Not Detected    Final 04/26/2019  2:45 PM [facility]   Norbuprenorphine, Urine Not Detected    Final 04/26/2019  2:45 PM [facility]   Nordiazepam, Urine Not Detected    Final 04/26/2019  2:45 PM [facility]   Norfentanyl, Urine Not Detected    Final 04/26/2019  2:45 PM [facility]   NORHYDROCODONE, URINE Not Detected    Final 04/26/2019  2:45 PM file    Intimate partner violence:     Fear of current or ex partner: Not on file     Emotionally abused: Not on file     Physically abused: Not on file     Forced sexual activity: Not on file   Other Topics Concern    Not on file   Social History Narrative    Not on file       Review of Systems:  Review of Systems   Constitution: Negative. HENT: Negative. Eyes: Positive for photophobia. Cardiovascular: Negative. Respiratory: Negative. Endocrine: Negative. Hematologic/Lymphatic: Negative. Skin: Negative. Musculoskeletal: Negative. Gastrointestinal: Negative. Genitourinary: Negative. Neurological: Positive for headaches. Psychiatric/Behavioral: Positive for depression. In therapy , managing         Physical Exam:  Pulse 78   Temp 98.4 °F (36.9 °C) (Oral)   Resp 20   Ht 6' 3\" (1.905 m)   Wt 300 lb (136.1 kg)   SpO2 96%   BMI 37.50 kg/m²     Physical Exam  HENT:      Head: Normocephalic. Eyes:      Extraocular Movements: Extraocular movements intact. Musculoskeletal:      Cervical back: Normal.   Skin:     General: Skin is warm and dry. Neurological:      Mental Status: He is alert. Sensory: Sensation is intact. Motor: Motor function is intact. Psychiatric:         Attention and Perception: Attention normal.         Speech: Speech normal.         Behavior: Behavior normal.         Thought Content:  Thought content normal.         Cognition and Memory: Cognition normal.         Judgment: Judgment normal.           Assessment:      Problem List Items Addressed This Visit     Intractable migraine without status migrainosus - Primary    Relevant Medications    tapentadol (NUCYNTA) 50 MG TABS (Start on 1/20/2020)    buprenorphine (BUTRANS) 5 MCG/HR PTWK (Start on 2/3/2020)    Generalized headaches    Relevant Medications    tapentadol (NUCYNTA) 50 MG TABS (Start on 1/20/2020)    buprenorphine (BUTRANS) 5 MCG/HR PTWK (Start on 2/3/2020)    Encounter for long-term opiate analgesic use    Relevant Medications    tapentadol (NUCYNTA) 50 MG TABS (Start on 1/20/2020)    Cervicalgia    Relevant Medications    tapentadol (NUCYNTA) 50 MG TABS (Start on 1/20/2020)    buprenorphine (Laura Jose) 5 MCG/HR PTWK (Start on 2/3/2020)    Cervical spondylosis without myelopathy    Relevant Medications    tapentadol (NUCYNTA) 50 MG TABS (Start on 1/20/2020)    buprenorphine (Laura Jose) 5 MCG/HR PTWK (Start on 2/3/2020)    Bilateral headaches    Relevant Medications    tapentadol (NUCYNTA) 50 MG TABS (Start on 1/20/2020)    buprenorphine (Laura Jose) 5 MCG/HR PTWK (Start on 2/3/2020)            Treatment Plan:  DISCUSSION: Treatment options discussed withpatient and all questions answered to patient's satisfaction. Possible side effects, risk of tolerance and or dependence and alternative treatments discussed    Obtaining appropriate analgesic effect of treatment   No signs of potential drug abuse or diversion identified    [x] Ill effects of being on chronic pain medications such as sleep disturbances, respiratory depression, hormonal changes, withdrawal symptoms, chronic opioid dependence and tolerance as well as risk of taking opioids with Benzodiazepines and taking opioids along with alcohol,  werediscussed with patient. I had asked the patient to minimize medication use and utilize pain medications only for uncontrolled rest pain or pain with exertional activities. I advised patient not to self-escalate painmedications without consulting with us. At each of patient's future visits we will try to taper pain medications, while adjusting the adjunct medications, and re-evaluating for Physical Therapy to improve spinal andjoint strength. We will continue to have discussions to decrease pain medications as tolerated. Counseled patient on effects their pain medication and /or their medical condition mayhave on their  ability to drive or operate machinery.  Instructed not to drive or operate machinery if drowsy     I also discussed with the patient regarding the dangers of combining narcotic pain medication with tranquilizers, alcohol or illegal drugs or taking the medication any way other than prescribed. The dangers were discussed  including respiratory depression and death. Patient was told to tell  all  physicians regarding the medications he is getting from pain clinic. Patient is warned not to take any unprescribed medications over-the-countermedications that can depress breathing . Patient is advised to talk to the pharmacist or physicians if planning to take any over-the-counter medications before  takeing them. Patient is strongly advised to avoid tranquilizers or  relaxants, illegal drugs  or any medications that can depress breathing  Patient is also advised to tell us if there is any changes in their medications from other physicians.         TREATMENT OPTIONS:     Return in 4 weeks  Medication Agreement Requirements Met  Continue Opioid therapy  Script written for  Nucynta, butrans patch  Follow up appointment made

## 2020-01-14 NOTE — DISCHARGE INSTR - COC
Continuity of Care Form    Patient Name: Melania Pierre   :  1985  MRN:  [id number]    Admit date:  (Not on file)  Discharge date:  ***    Code Status Order: No Order   Advance Directives:     Admitting Physician:  No admitting provider for patient encounter. PCP: Lroi Carrera    Discharging Nurse: [facility] Unit/Room#: No information available for this encounter. Discharging Unit Phone Number: ***    Emergency Contact:   Extended Emergency Contact Information  Primary Emergency Contact: [facility]  Address: [address] [place] [address]. Staffa Leopolda   Home Phone: [phone]  Relation: Parent    Past Surgical History:  Past Surgical History:   Procedure Laterality Date    UPPP UVULOPALATOPHARYGOPLASTY         Immunization History: There is no immunization history on file for this patient. Active Problems:  Patient Active Problem List   Diagnosis Code    Intractable migraine without status migrainosus G43.919    Bilateral headaches R51    Encounter for long-term opiate analgesic use Z79.891    Cervicalgia M54.2    Cervical spondylosis without myelopathy M47.812    Generalized headaches R51    Klinefelter syndrome Q98.4       Isolation/Infection:   Isolation          No Isolation        Patient Infection Status     None to display          Nurse Assessment:  Last Vital Signs: There were no vitals taken for this visit.     Last documented pain score (0-10 scale):    Last Weight:   Wt Readings from Last 1 Encounters:   19 300 lb (136.1 kg)     Mental Status:  {IP PT MENTAL STATUS:04431}    IV Access:  { VANNESA IV ACCESS:685922552}    Nursing Mobility/ADLs:  Walking   {CHP DME GPKV:800222456}  Transfer  {CHP DME GGVI:037225351}  Bathing  {CHP DME SELI:510762294}  Dressing  {CHP DME QAKV:013068635}  Toileting  {CHP DME RFNI:047314430}  Feeding  {CHP DME FRGL:356321160}  Med Admin  {CHP DME LHS}  Med Delivery   { VANNESA MED Condition:559650739}    Rehab Potential (if transferring to Rehab): {Prognosis:3444659167}    Recommended Labs or Other Treatments After Discharge: ***    Physician Certification: I certify the above information and transfer of Derek Phelps  is necessary for the continuing treatment of the diagnosis listed and that he requires {Admit to Appropriate Level of Care:37907} for {GREATER/LESS:612969041} 30 days.      Update Admission H&P: {CHP DME Changes in CSVLE:962747837}    PHYSICIAN SIGNATURE:  {Esignature:912699210}

## 2020-02-17 ENCOUNTER — HOSPITAL ENCOUNTER (OUTPATIENT)
Dept: PAIN MANAGEMENT | Age: 35
Discharge: HOME | End: 2020-02-17
Payer: MEDICAID

## 2020-02-17 VITALS
TEMPERATURE: 97.4 F | HEART RATE: 74 BPM | DIASTOLIC BLOOD PRESSURE: 82 MMHG | SYSTOLIC BLOOD PRESSURE: 117 MMHG | RESPIRATION RATE: 16 BRPM | BODY MASS INDEX: 37.3 KG/M2 | WEIGHT: 300 LBS | HEIGHT: 75 IN | OXYGEN SATURATION: 95 %

## 2020-02-17 PROCEDURE — 99213 OFFICE O/P EST LOW 20 MIN: CPT | Performed by: NURSE PRACTITIONER

## 2020-02-17 PROCEDURE — 80307 DRUG TEST PRSMV CHEM ANLYZR: CPT

## 2020-02-17 PROCEDURE — 99213 OFFICE O/P EST LOW 20 MIN: CPT

## 2020-02-17 RX ORDER — ANASTROZOLE 1 MG/1
1 TABLET ORAL DAILY
COMMUNITY

## 2020-02-17 ASSESSMENT — ENCOUNTER SYMPTOMS
PHOTOPHOBIA: 1
GASTROINTESTINAL NEGATIVE: 1
SHORTNESS OF BREATH: 1

## 2020-02-17 NOTE — PROGRESS NOTES
functional status., Obtaining appropriate analgesic effect of treatment. Eddy Cooper, APRN - CNP)  Review ofOARRS does not show any aberrant prescription behavior. Medication is helping the patient stay active. Patient denies any side effects and reports adequate analgesia. No sign of misuse/abuse. Record/Diagnostics Review:      As above, I did review the imaging  When was thelast UDS:  4-26-19           Was the UDS appropriate:  CONSISTENT with medications provided:   TAPENTADOL : based on tapentadol, tapentadol-o-sulfate   BUPRENORPHINE : based on the absence of buprenorphine and   metabolites      Record/Diagnostics Review:       As above, I did review the imaging     5/3/2019  2:51 PM - Bull, Mhpn Incoming Lab Results From [facility]      Component Value Ref Range & Units Status Collected Lab   Pain Management Drug Panel Interp, Urine Consistent    Final 04/26/2019  2:45 PM [facility]   (NOTE)   ________________________________________________________________   DRUGS EXPECTED:   TAPENTADOL [4/26/2019]   BUPRENORPHINE [4/15/19]   ________________________________________________________________   CONSISTENT with medications provided:   TAPENTADOL : based on tapentadol, tapentadol-o-sulfate   BUPRENORPHINE : based on the absence of buprenorphine and   metabolites   ________________________________________________________________   INTERPRETIVE INFORMATION: Pain Mgt Abel, Mass Spec/EMIT, Ur,                            Interp   Interpretation depends on accuracy and completeness of patient   medication information submitted by client.     6-Acetylmorphine, Ur Not Detected    Final 04/26/2019  2:45 PM [facility]   7-Aminoclonazepam, Urine Not Detected    Final 04/26/2019  2:45 PM [facility]   Alpha-OH-Alpraz, Urine Not Detected    Final 04/26/2019  2:45 PM [facility]   Alprazolam, Urine Not Detected    Final 04/26/2019  2:45 PM [facility]   Amphetamines, urine Not Detected    Final 04/26/2019  2:45 PM [facility]   Barbiturates, Ur Not Detected    Urine Not Detected    Final 04/26/2019  2:45 PM [facility]   Oxycodone Urine Not Detected    Final 04/26/2019  2:45 PM [facility]   Oxymorphone, Urine Not Detected    Final 04/26/2019  2:45 PM [facility]   PCP, Urine Not Detected    Final 04/26/2019  2:45 PM [facility]   Phentermine, Ur Not Detected    Final 04/26/2019  2:45 PM [facility]   Propoxyphene, Urine Not Detected    Final 04/26/2019  2:45 PM [facility]   Tapentadol-O-Sulfate, Urine Present    Final 04/26/2019  2:45 PM [facility]   Tapentadol, Urine Present    Final 04/26/2019  2:45 PM [facility]   Temazepam, Urine Not Detected    Final 04/26/2019  2:45 PM [facility]   Tramadol, Urine Not Detected    Final 04/26/2019  2:45 PM [facility]   Zolpidem, Urine Not Detected    Final 04/26/2019  2:45 PM [facility]   Drugs Expected, Ur     Final 04/26/2019  2:45  [facility]   TAPENTADOL (NUCYNTA) ON 4/15/19, BURENORPHINE ON 4/25/19 AT 1 PM    Creatinine, Ur 162.5  20.0 - 400.0 mg/dL Final 04/26/2019  2:45 PM [facility]   Pain Mgt Drug Panel, Hi Res, Ur See Below    Final 04/26/2019  2:45 PM [facility]   (NOTE)   Methodology: Qualitative Enzyme Immunoassay and Qualitative Liquid   Chromatography-Time of Flight-Mass Spectrometry or Tandem Mass   Spectrometry, Quantitative Spectrophotometry   The absence of expected drug(s) and/or drug metabolite(s) may   indicate non-compliance, inappropriate timing of specimen   collection relative to drug administration, poor drug absorption,   diluted/adulterated urine, or limitations of testing. The   concentration must be greater than or equal to the cutoff to be   reported as present.  If specific drug concentrations are   required, contact the laboratory within two weeks of specimen   collection to request quantification by a second analytical   technique. Interpretive questions should be directed to the   laboratory. Results based on immunoassay detection that do not match clinical   expectations should be   interpreted with caution.  Confirmatory testing by mass   spectrometry for immunoassay-based results is available, if   ordered within two weeks of specimen collection. Additional   charges apply. For medical purposes only; not valid for forensic use. This test was developed and its performance characteristics   determined by Darin Walter. The U.S. Food and Drug   Administration has not approved or cleared this test; however, FDA   clearance or approval is not currently required for clinical use. The results are not intended to be used as the sole means for   clinical diagnosis or patient management decisions. EER Hi Res Interp Ur See Note    Final 2019  2:45 PM [facility]   (NOTE)   Access [facility] Enhanced Report using either link below:   -Direct access: [email or web address]   -Enter Username, Password: [email or web address]   Username: [email or web address]   Password: [email or web address]   Performed by Darin Walter[address] [address] [phone]              Past Medical History:   Diagnosis Date    Asthma     Depression     Headache(784.0)     Klinefelter syndrome        Past Surgical History:   Procedure Laterality Date    UPPP UVULOPALATOPHARYGOPLASTY         Allergies   Allergen Reactions    Food      Bananas, eggs, milk, cherries    Seasonal      Hay fever           Current Outpatient Medications:     anastrozole (ARIMIDEX) 1 MG tablet, Take 1 mg by mouth daily takes 1/2 tab, Disp: , Rfl:     [START ON 2020] tapentadol (NUCYNTA) 50 MG TABS, Take 1 tablet by mouth every 8 hours for 30 days. , Disp: 90 tablet, Rfl: 0    buprenorphine (BUTRANS) 5 MCG/HR PTWK, Place 1 patch onto the skin once a week for 31 days. , Disp: 4 patch, Rfl: 0    DULoxetine (CYMBALTA) 20 MG extended release capsule, TAKE 1 CAPSULE BY MOUTH ONCE A DAY, Disp: , Rfl: 2    divalproex (DEPAKOTE) 250 MG DR tablet, TAKE ONE TABLET BY MOUTH TWICE DAILY, Disp: , Rfl: 0    NONFORMULARY, New rx testosterone implant, Disp: , Rfl:     nadolol (CORGARD) 40 MG tablet, - AS DIRECTED FOR TREMOR, Disp: , Rfl: 5    loratadine-pseudoephedrine (CLARITIN-D 12 HOUR) 5-120 MG per extended release tablet, Take 1 tablet by mouth 2 times daily, Disp: , Rfl:     VENTOLIN  (90 Base) MCG/ACT inhaler, TWO SPRAYS BY MOUTH FOUR TIMES A DAY AS NEEDED, Disp: , Rfl: 5    baclofen (LIORESAL) 10 MG tablet, Take 10 mg by mouth 3 times daily, Disp: , Rfl:     cyclobenzaprine (FLEXERIL) 10 MG tablet, , Disp: , Rfl:     Family History   Problem Relation Age of Onset    High Blood Pressure Father     Mental Illness Sister     High Blood Pressure Paternal Grandmother     Cancer Paternal Grandmother     Heart Disease Paternal Grandfather     High Blood Pressure Paternal Grandfather     Stroke Paternal Grandfather        Social History     Socioeconomic History    Marital status: Single     Spouse name: Not on file    Number of children: Not on file    Years of education: Not on file    Highest education level: Not on file   Occupational History    Occupation: disability   Social Needs    Financial resource strain: Not on file    Food insecurity:     Worry: Not on file     Inability: Not on file    Transportation needs:     Medical: Not on file     Non-medical: Not on file   Tobacco Use    Smoking status: Never Smoker    Smokeless tobacco: Never Used   Substance and Sexual Activity    Alcohol use: Yes     Comment: rare , hard cider or wine    Drug use: No    Sexual activity: Never   Lifestyle    Physical activity:     Days per week: Not on file     Minutes per session: Not on file    Stress: Not on file   Relationships    Social connections:     Talks on phone: Not on file     Gets together: Not on file     Attends [employer] service: Not on file     Active member of club or organization: Not on file     Attends meetings of clubs or organizations: Not on file     Relationship status: Not on file    Intimate partner violence:     Fear of current or ex partner: Not on file

## 2020-02-21 LAB
6-ACETYLMORPHINE, UR: NOT DETECTED
7-AMINOCLONAZEPAM, URINE: NOT DETECTED
ALPHA-OH-ALPRAZ, URINE: NOT DETECTED
ALPRAZOLAM, URINE: NOT DETECTED
AMPHETAMINES, URINE: NOT DETECTED
BARBITURATES, URINE: NOT DETECTED
BENZOYLECGONINE, UR: NOT DETECTED
BUPRENORPHINE URINE: NOT DETECTED
CARISOPRODOL, UR: NOT DETECTED
CLONAZEPAM, URINE: NOT DETECTED
CODEINE, URINE: NOT DETECTED
CREATININE URINE: >400 MG/DL (ref 20–400)
DIAZEPAM, URINE: NOT DETECTED
DRUGS EXPECTED, UR: ABNORMAL
EER HI RES INTERP UR: ABNORMAL
ETHYL GLUCURONIDE UR: NOT DETECTED
FENTANYL URINE: NOT DETECTED
HYDROCODONE, URINE: NOT DETECTED
HYDROMORPHONE, URINE: NOT DETECTED
LORAZEPAM, URINE: NOT DETECTED
MARIJUANA METAB, UR: NOT DETECTED
MDA, UR: NOT DETECTED
MDEA, EVE, UR: NOT DETECTED
MDMA URINE: NOT DETECTED
MEPERIDINE METAB, UR: NOT DETECTED
METHADONE, URINE: NOT DETECTED
METHAMPHETAMINE, URINE: NOT DETECTED
METHYLPHENIDATE: NOT DETECTED
MIDAZOLAM, URINE: NOT DETECTED
MORPHINE URINE: NOT DETECTED
NORBUPRENORPHINE, URINE: NOT DETECTED
NORDIAZEPAM, URINE: NOT DETECTED
NORFENTANYL, URINE: NOT DETECTED
NORHYDROCODONE, URINE: NOT DETECTED
NOROXYCODONE, URINE: NOT DETECTED
NOROXYMORPHONE, URINE: NOT DETECTED
OXAZEPAM, URINE: NOT DETECTED
OXYCODONE URINE: NOT DETECTED
OXYMORPHONE, URINE: NOT DETECTED
PAIN MANAGEMENT DRUG PANEL INTERP, URINE: ABNORMAL
PAIN MGT DRUG PANEL, HI RES, UR: ABNORMAL
PCP,URINE: NOT DETECTED
PHENTERMINE, UR: NOT DETECTED
PROPOXYPHENE, URINE: NOT DETECTED
TAPENTADOL, URINE: PRESENT
TAPENTADOL-O-SULFATE, URINE: PRESENT
TEMAZEPAM, URINE: NOT DETECTED
TRAMADOL, URINE: NOT DETECTED
ZOLPIDEM, URINE: NOT DETECTED

## 2020-03-12 ENCOUNTER — HOSPITAL ENCOUNTER (OUTPATIENT)
Dept: PAIN MANAGEMENT | Age: 35
Discharge: HOME | End: 2020-03-12
Payer: MEDICAID

## 2020-03-12 VITALS
OXYGEN SATURATION: 95 % | SYSTOLIC BLOOD PRESSURE: 122 MMHG | TEMPERATURE: 97.7 F | HEART RATE: 73 BPM | WEIGHT: 300 LBS | DIASTOLIC BLOOD PRESSURE: 66 MMHG | BODY MASS INDEX: 37.3 KG/M2 | RESPIRATION RATE: 20 BRPM | HEIGHT: 75 IN

## 2020-03-12 PROCEDURE — 99213 OFFICE O/P EST LOW 20 MIN: CPT

## 2020-03-12 PROCEDURE — 99214 OFFICE O/P EST MOD 30 MIN: CPT | Performed by: PAIN MEDICINE

## 2020-03-12 RX ORDER — BUPRENORPHINE 5 UG/H
1 PATCH TRANSDERMAL WEEKLY
Qty: 4 PATCH | Refills: 0 | Status: SHIPPED | OUTPATIENT
Start: 2020-03-12 | End: 2020-04-10 | Stop reason: SDUPTHER

## 2020-03-12 RX ORDER — TAPENTADOL HYDROCHLORIDE 50 MG/1
50 TABLET, FILM COATED ORAL EVERY 8 HOURS
Qty: 90 TABLET | Refills: 0 | Status: SHIPPED | OUTPATIENT
Start: 2020-03-20 | End: 2020-04-14 | Stop reason: SDUPTHER

## 2020-03-12 ASSESSMENT — PAIN DESCRIPTION - LOCATION: LOCATION: HEAD

## 2020-03-12 ASSESSMENT — ENCOUNTER SYMPTOMS
EYE PAIN: 0
EYE REDNESS: 0
ALLERGIC/IMMUNOLOGIC NEGATIVE: 1
EYE WATERING: 0
GASTROINTESTINAL NEGATIVE: 1
RESPIRATORY NEGATIVE: 1
COUGH: 0
VOMITING: 0
BACK PAIN: 0
BLURRED VISION: 0
PHOTOPHOBIA: 1
ABDOMINAL PAIN: 0

## 2020-03-12 ASSESSMENT — PAIN SCALES - GENERAL: PAINLEVEL_OUTOF10: 7

## 2020-03-12 ASSESSMENT — PAIN DESCRIPTION - PAIN TYPE: TYPE: CHRONIC PAIN

## 2020-03-12 ASSESSMENT — PAIN DESCRIPTION - PROGRESSION: CLINICAL_PROGRESSION: NOT CHANGED

## 2020-03-12 ASSESSMENT — PAIN DESCRIPTION - DESCRIPTORS: DESCRIPTORS: ACHING;STABBING;THROBBING

## 2020-03-12 ASSESSMENT — PAIN DESCRIPTION - FREQUENCY: FREQUENCY: CONTINUOUS

## 2020-03-12 ASSESSMENT — PAIN - FUNCTIONAL ASSESSMENT: PAIN_FUNCTIONAL_ASSESSMENT: PREVENTS OR INTERFERES WITH ALL ACTIVE AND SOME PASSIVE ACTIVITIES

## 2020-03-12 ASSESSMENT — PAIN DESCRIPTION - ONSET: ONSET: ON-GOING

## 2020-03-12 NOTE — PROGRESS NOTES
treated with lower dose. Periodic Controlled Substance Monitoring Possible medication side effects, risk of tolerance/dependence & alternative treatments discussed. ;No signs of potential drug abuse or diversion identified. ;Assessed functional status. ;Obtaining appropriate analgesic effect of treatment. Chronic Pain > 50 MEDD Re-evaluated the status of the patient's underlying condition causing pain.;Obtained or confirmed \"Consent for Opioid Use\" on file. Chronic Pain > 80 MEDD -     Patient relates current medications are helping the pain. Patient reports taking pain medications as prescribed, denies obtaining medications from different sources and denies use of illegal drugs. Patient denies side effects from medications like nausea, vomiting, constipation or drowsiness. Patient reports current activities of daily living ar possible due to medications and would like to continue them. Current Pain Assessment  Pain Assessment  Pain Assessment: 0-10  Pain Level: 7  Patient's Stated Pain Goal: 2(increase activity decrease pain)  Pain Type: Chronic pain  Pain Location: Head  Pain Orientation: (entire head )  Pain Radiating Towards: varies at times  Pain Descriptors: Aching, Stabbing, Throbbing  Pain Frequency: Continuous  Pain Onset: On-going  Clinical Progression: Not changed  Effect of Pain on Daily Activities: ADLs difficult because of pain  Functional Pain Assessment: Prevents or interferes with all active and some passive activities  Non-Pharmaceutical Pain Intervention(s): Repositioned, Rest(dark room)                    ADVERSE MEDICATION EFFECTS:   Constipation: no  Bowel Regimen: No:   Diet: common adult  Appetite:  ok  Sedation:  no  Urinary Retention: no    FOCUSED PAINSCALE:  Highest : 10  Lowest :3  Average: Range-6  When and What  was your last procedure:      Was your procedure effective:  not applicable    ACTIVITY/SOCIAL/EMOTIONAL:  Sleep Pattern: 9 hours per night.  generally restful (LIORESAL) 10 MG tablet Take 10 mg by mouth 3 times daily      cyclobenzaprine (FLEXERIL) 10 MG tablet        No current facility-administered medications for this encounter. Allergies  Food and Seasonal    Family History  family history includes Cancer in his paternal grandmother; Heart Disease in his paternal grandfather; High Blood Pressure in his father, paternal grandfather, and paternal grandmother; Mental Illness in his sister; Stroke in his paternal grandfather. Social History  Social History     Socioeconomic History    Marital status: Single     Spouse name: None    Number of children: None    Years of education: None    Highest education level: None   Occupational History    Occupation: disability   Social Needs    Financial resource strain: None    Food insecurity     Worry: None     Inability: None    Transportation needs     Medical: None     Non-medical: None   Tobacco Use    Smoking status: Never Smoker    Smokeless tobacco: Never Used   Substance and Sexual Activity    Alcohol use: Yes     Comment: rare , hard cider or wine    Drug use: No    Sexual activity: Never   Lifestyle    Physical activity     Days per week: None     Minutes per session: None    Stress: None   Relationships    Social connections     Talks on phone: None     Gets together: None     Attends Yazidism service: None     Active member of club or organization: None     Attends meetings of clubs or organizations: None     Relationship status: None    Intimate partner violence     Fear of current or ex partner: None     Emotionally abused: None     Physically abused: None     Forced sexual activity: None   Other Topics Concern    None   Social History Narrative    None      reports no history of drug use. REVIEW OF SYSTEMS:  Review of Systems   Constitutional: Positive for fatigue. Negative for activity change, appetite change and fever. HENT: Negative.   Negative for congestion, hearing loss and tinnitus. Eyes: Positive for photophobia. Negative for blurred vision, pain and redness. Sensitive  To light   Respiratory: Negative. Negative for cough, choking and shortness of breath. Asthma- inhaler   Cardiovascular: Negative. Negative for chest pain and palpitations. Gastrointestinal: Negative. Negative for abdominal pain, diarrhea and vomiting. Endocrine: Negative. Negative for cold intolerance and polyuria. Genitourinary: Negative. Negative for dysuria and genital sores. Musculoskeletal: Negative for arthralgias, back pain and neck pain. Head pain   Skin: Negative. Allergic/Immunologic: Negative. Negative for immunocompromised state. Neurological: Positive for headaches. Negative for tingling, syncope, weakness and numbness. Hematological: Negative. Psychiatric/Behavioral: Negative for confusion, self-injury, sleep disturbance and suicidal ideas. The patient is not nervous/anxious. Depression            GENERAL PHYSICAL EXAM:  Vitals: /66   Pulse 73   Temp 97.7 °F (36.5 °C) (Oral)   Resp 20   Ht 6' 3\" (1.905 m)   Wt 300 lb (136.1 kg)   SpO2 95%   BMI 37.50 kg/m² , Body mass index is 37.5 kg/m². Physical Exam  Constitutional:       Appearance: Normal appearance. He is well-developed. Comments: Patient is wearing dark  glasses and is in a dark room because he is having a severe headache at this time. HENT:      Head: Normocephalic and atraumatic. Right Ear: Hearing normal.      Left Ear: Hearing normal.      Nose: Nose normal.      Mouth/Throat:      Mouth: Mucous membranes are moist.      Pharynx: Uvula midline. Eyes:      Extraocular Movements: Extraocular movements intact. Conjunctiva/sclera: Conjunctivae normal.      Pupils: Pupils are equal, round, and reactive to light. Neck:      Musculoskeletal: Normal range of motion and neck supple. No neck rigidity. Thyroid: No thyromegaly.       Trachea: No tracheal with movements of the neck. Nurses Notes and Vital Signs reviewed. DATA  Labs:  Benzodiazepine Screen, Urine   Date Value Ref Range Status   07/10/2018 NEGATIVE NEG Final     Comment:           (Positive cutoff 200 ng/mL)                      Imaging:  Radiology Images and Reports reviewed where indicated and necessary  Cervical Spine:  9/21/2016.       Reason for study: Chronic migraines and neck pain        Comparison: None.       Technique: 8 images of the cervical spine were obtained.        Findings:  There is normal alignment without significant alignment shift upon flexion.  There is no acute fracture or subluxation.  The dens is intact. Oblique views demonstrate the neural foramen to be widely patent bilaterally       The vertebral bodies demonstrate normal height.  The intervertebral disc spaces are well maintained.  There is no prevertebral soft tissue swelling.       Impression: Unremarkable cervical spine radiographs.       Final report electronically signed by Martha Perry M.D. on 9/21/2016        Patient Active Problem List   Diagnosis    Intractable migraine without status migrainosus    Bilateral headaches    Encounter for long-term opiate analgesic use    Cervicalgia    Cervical spondylosis without myelopathy    Generalized headaches    Klinefelter syndrome        ASSESSMENT    Constancjacey Knowles is a 30 y.o. male with     1. Bilateral headaches    2. Encounter for long-term opiate analgesic use    3. Cervicalgia    4. Cervical spondylosis without myelopathy    5. Generalized headaches    6. Klinefelter syndrome    7. Intractable persistent migraine aura without cerebral infarction and without status migrainosus    8. Intractable migraine without status migrainosus, unspecified migraine type           PLAN    We will continue current pain medications  Current medications are being tolerated without any Adverse side effects.   Orders Placed This Encounter   Medications    tapentadol (NUCYNTA) 50 MG TABS     Sig: Take 1 tablet by mouth every 8 hours for 30 days. Dispense:  90 tablet     Refill:  0     Reduce doses taken as pain becomes manageable    buprenorphine (BUTRANS) 5 MCG/HR PTWK     Sig: Place 1 patch onto the skin once a week for 28 days. Dispense:  4 patch     Refill:  0     Urine drug screens have been appropriate. No aberrant activity noted. Analgesia is achieved. Activities of daily living are possible because of medications. Safe use of medications explained to patient. Counselling/Preventive measures for pain  Control:    [x]  Spine strengthening exercises are discussed with patient in detail. [x] Ill effects of being on chronic pain medications such as sleep disturbances, hormonal changes, withdrawal symptoms,  chronic opioid dependence and tolerance were discussed with patient. I had asked the patient to minimize medication use and utilize pain medications only for uncontrolled rest pain or pain with exertional activities. I advised patient not to self escalate pain medications without consulting with us. At each of patient's future visits we will try to taper pain medications, while adjusting the adjunct medications, and re-evaluating for Physical Therapy to improve spinal and joint strength. We will continue to have discussions to decrease pain medications as tolerated. I also discussed with the patient regarding the dangers of combining narcotic pain medication with tranquilizers, alcohol or illegal drugs or taking the medication any other than prescribed. The dangers including the respiratory depression and death. Patient was told to tell  to all  physicians regarding the medications he is getting from pain clinic. Patient is warned not to take any unprescribed medications over-the-counter medications that can depress breathing .  Patient is advised to talk to the pharmacist or physicians if planning to take any over-the-counter medications before

## 2020-03-14 ASSESSMENT — ENCOUNTER SYMPTOMS
DIARRHEA: 0
SHORTNESS OF BREATH: 0
CHOKING: 0

## 2020-04-10 RX ORDER — BUPRENORPHINE 5 UG/H
1 PATCH, EXTENDED RELEASE TRANSDERMAL WEEKLY
Qty: 4 PATCH | Refills: 0 | Status: SHIPPED | OUTPATIENT
Start: 2020-04-10 | End: 2020-06-10 | Stop reason: SDUPTHER

## 2020-04-14 RX ORDER — TAPENTADOL HYDROCHLORIDE 50 MG/1
50 TABLET, FILM COATED ORAL EVERY 8 HOURS
Qty: 90 TABLET | Refills: 0 | Status: SHIPPED | OUTPATIENT
Start: 2020-04-17 | End: 2020-05-13 | Stop reason: SDUPTHER

## 2020-05-13 ENCOUNTER — HOSPITAL ENCOUNTER (OUTPATIENT)
Dept: PAIN MANAGEMENT | Age: 35
Discharge: HOME | End: 2020-05-13
Payer: MEDICAID

## 2020-05-13 PROCEDURE — 99213 OFFICE O/P EST LOW 20 MIN: CPT

## 2020-05-13 PROCEDURE — 99212 OFFICE O/P EST SF 10 MIN: CPT | Performed by: NURSE PRACTITIONER

## 2020-05-13 RX ORDER — TAPENTADOL HYDROCHLORIDE 50 MG/1
50 TABLET, FILM COATED ORAL EVERY 8 HOURS
Qty: 90 TABLET | Refills: 0 | Status: SHIPPED | OUTPATIENT
Start: 2020-05-19 | End: 2020-06-10 | Stop reason: SDUPTHER

## 2020-05-13 ASSESSMENT — ENCOUNTER SYMPTOMS
PHOTOPHOBIA: 1
GASTROINTESTINAL NEGATIVE: 1

## 2020-05-13 NOTE — PROGRESS NOTES
[facility] PROGRESS NOTE      Patient  Phone visit for   review Medication Agreement    Chief Complaint: headache      HPI: He c/ migraines  Headache which he has had since the age of 17. He was seen at [facility] and seen by neurologists in the past. He had been on morphine and methadone in 2017 and had been on topamax.  He tried aimovig for 2 months which did not improve his headaches. Prior treatments included  Acupressure was in [place]  with  Laura  several years ( 15) years ago. Chiropractic treatment a few different Chiropractor visits, with neck adjustments x 15 years Pain is unchanged. His managed  with Butrans patch and nucynta. He states he is unable to sleep through the night, No Ed visits, Activity has been low. Migraine    This is a chronic problem. The current episode started more than 1 year ago. The problem occurs constantly. The problem has been unchanged. The pain is located in the left unilateral region. The pain does not radiate. The pain quality is similar to prior headaches. Quality: throbbing. The pain is at a severity of 5/10. The pain is moderate. Associated symptoms include phonophobia and photophobia. The symptoms are aggravated by bright light (perfumes). He has tried darkened room, cold packs, triptans, beta blockers and oral narcotics for the symptoms. The treatment provided mild relief. Patient denies any new neurological symptoms. No bowel or bladder incontinence, no weakness, and no falling.     Any new diagnostic workup: [x] no  [] yes [] Xray [] CT scan [] MRI [] DEXA scan     [] Other                    Treatment goals:  Functional status: pain to be more manageable    Aberrancy:   Any alcoholic beverages no       Any illegal drugs   no      Analgesia:5    Adverse  Effects :drowsiness    ADL;s :activity low        Pill count: appropriate       Morphine MEQ: 60.12  Periodic Controlled Substance Monitoring: Possible medication side effects, risk of tolerance/dependence & alternative treatments discussed., No signs of potential drug abuse or diversion identified. , Assessed functional status., Obtaining appropriate analgesic effect of treatment. [facility], APRN - CNP)  Review ofOARRS does not show any aberrant prescription behavior. Medication is helping the patient stay active. Patient denies any side effects and reports adequate analgesia. No sign of misuse/abuse. When was thelast UDS:  2-           Was the UDS appropriate:yes  Buprenorphine was  not detected      Record/Diagnostics Review:      As above, I did review the imaging      2/21/2020 10:59 PM - Bull, Kash Incoming Lab Results From [employer] [facility]     Component Value Ref Range & Units Status Collected Lab   Pain Management Drug Panel Interp, Urine Inconsistent   Final 02/17/2020  1:15 PM [facility]   (NOTE)   ________________________________________________________________   DRUGS EXPECTED:   NUCYNTA (TAPENTADOL) [2/17/20]   BUTRANS (BUPRENORPHINE) [REMOVED THIS AM]   ________________________________________________________________   CONSISTENT with medications provided:   Emely Burgess (TAPENTADOL) : based on tapentadol, tapentadol-o-sulfate   ________________________________________________________________   INCONSISTENT with medications provided:   BUTRANS (BUPRENORPHINE) : based on the absence of buprenorphine   and metabolites   ________________________________________________________________   INTERPRETIVE INFORMATION: Pain Mgt Abel, Mass Spec/EMIT, Ur,                            Interp   Interpretation depends on accuracy and completeness of patient   medication information submitted by client.     6-Acetylmorphine, Ur Not Detected   Final 02/17/2020  1:15 PM [facility]   7-Aminoclonazepam, Urine Not Detected   Final 02/17/2020  1:15 PM [facility]   Alpha-OH-Alpraz, Urine Not Detected   Final 02/17/2020  1:15 PM [facility]   Alprazolam, Urine Not Detected   Final 02/17/2020  1:15 PM [facility] interpreted with caution. Confirmatory testing by mass   spectrometry for immunoassay-based results is available, if   ordered within two weeks of specimen collection. Additional   charges apply. For medical purposes only; not valid for forensic use. This test was developed and its performance characteristics   determined by Darin Walter. The U.S. Food and Drug   Administration has not approved or cleared this test; however, FDA   clearance or approval is not currently required for clinical use. The results are not intended to be used as the sole means for   clinical diagnosis or patient management decisions. EER Hi Res Interp Ur See Note   Final 02/17/2020  1:15 PM [facility]   (NOTE)   Access [facility] Enhanced Report using either link below:   -Direct access: [email or web address]   -Enter Username, Password: [email or web address]   Username: [email or web address]   Password: [email or web address]   Performed by [place] [id number], [address] [facility] [phone]   www. Inna Keys MD, Lab. Director          Past Medical History:   Diagnosis Date    Asthma     Depression     Headache(784.0)     Klinefelter syndrome        Past Surgical History:   Procedure Laterality Date    UPPP UVULOPALATOPHARYGOPLASTY         Allergies   Allergen Reactions    Food      Bananas, eggs, milk, cherries    Seasonal      Hay fever           Current Outpatient Medications:     tapentadol (NUCYNTA) 50 MG TABS, Take 1 tablet by mouth every 8 hours for 30 days. , Disp: 90 tablet, Rfl: 0    anastrozole (ARIMIDEX) 1 MG tablet, Take 1 mg by mouth daily takes 1/2 tab, Disp: , Rfl:     DULoxetine (CYMBALTA) 20 MG extended release capsule, TAKE 1 CAPSULE BY MOUTH ONCE A DAY, Disp: , Rfl: 2    divalproex (DEPAKOTE) 250 MG DR tablet, TAKE ONE TABLET BY MOUTH TWICE DAILY, Disp: , Rfl: 0    nadolol (CORGARD) 40 MG tablet, 1/4-1/2 AS DIRECTED FOR TREMOR, Disp: , Rfl: 5    loratadine-pseudoephedrine (CLARITIN-D 12 HOUR) 5-120 MG per extended release tablet, Take 1 tablet by mouth 2 times daily, Disp: , Rfl:     NONFORMULARY, New rx testosterone implant, Disp: , Rfl:     VENTOLIN  (90 Base) MCG/ACT inhaler, TWO SPRAYS BY MOUTH FOUR TIMES A DAY AS NEEDED, Disp: , Rfl: 5    baclofen (LIORESAL) 10 MG tablet, Take 10 mg by mouth 3 times daily, Disp: , Rfl:     cyclobenzaprine (FLEXERIL) 10 MG tablet, , Disp: , Rfl:     Family History   Problem Relation Age of Onset    High Blood Pressure Father     Mental Illness Sister     High Blood Pressure Paternal Grandmother     Cancer Paternal Grandmother     Heart Disease Paternal Grandfather     High Blood Pressure Paternal Grandfather     Stroke Paternal Grandfather        Social History     Socioeconomic History    Marital status: Single     Spouse name: Not on file    Number of children: Not on file    Years of education: Not on file    Highest education level: Not on file   Occupational History    Occupation: disability   Social Needs    Financial resource strain: Not on file    Food insecurity     Worry: Not on file     Inability: Not on file   [employer] needs     Medical: Not on file     Non-medical: Not on file   Tobacco Use    Smoking status: Never Smoker    Smokeless tobacco: Never Used   Substance and Sexual Activity    Alcohol use: Yes     Comment: rare , hard cider or wine    Drug use: No    Sexual activity: Never   Lifestyle    Physical activity     Days per week: Not on file     Minutes per session: Not on file    Stress: Not on file   Relationships    Social connections     Talks on phone: Not on file     Gets together: Not on file     Attends Shinto service: Not on file     Active member of club or organization: Not on file     Attends meetings of clubs or organizations: Not on file     Relationship status: Not on file    Intimate partner violence     Fear of current or ex partner: Not on file     Emotionally consulting with us. At each of patient's future visits we will try to taper pain medications, while adjusting the adjunct medications, and re-evaluating for Physical Therapy to improve spinal andjoint strength. We will continue to have discussions to decrease pain medications as tolerated. Counseled patient on effects their pain medication and /or their medical condition mayhave on their  ability to drive or operate machinery. Instructed not to drive or operate machinery if drowsy     I also discussed with the patient regarding the dangers of combining narcotic pain medication with tranquilizers, alcohol or illegal drugs or taking the medication any way other than prescribed. The dangers were discussed  including respiratory depression and death. Patient was told to tell  all  physicians regarding the medications he is getting from pain clinic. Patient is warned not to take any unprescribed medications over-the-countermedications that can depress breathing . Patient is advised to talk to the pharmacist or physicians if planning to take any over-the-counter medications before  takeing them. Patient is strongly advised to avoid tranquilizers or  relaxants, illegal drugs  or any medications that can depress breathing  Patient is also advised to tell us if there is any changes in their medications from other physicians.     Location:  patient  at  home     , provider working at [facility]  Phone call: 9 minutesHe dd not need refill on Butrans patch    TREATMENT OPTIONS:       Medication Agreement Requirements Met  Continue Opioid therapy  Script written for nucynta  Follow up appointment made

## 2020-06-10 ENCOUNTER — HOSPITAL ENCOUNTER (OUTPATIENT)
Dept: PAIN MANAGEMENT | Age: 35
Discharge: HOME | End: 2020-06-10
Payer: MEDICAID

## 2020-06-10 PROCEDURE — 99212 OFFICE O/P EST SF 10 MIN: CPT | Performed by: NURSE PRACTITIONER

## 2020-06-10 PROCEDURE — 99213 OFFICE O/P EST LOW 20 MIN: CPT

## 2020-06-10 RX ORDER — BUPRENORPHINE 5 UG/H
1 PATCH, EXTENDED RELEASE TRANSDERMAL WEEKLY
Qty: 4 PATCH | Refills: 0 | Status: SHIPPED | OUTPATIENT
Start: 2020-06-10 | End: 2020-08-06 | Stop reason: SDUPTHER

## 2020-06-10 RX ORDER — TAPENTADOL HYDROCHLORIDE 50 MG/1
50 TABLET, FILM COATED ORAL EVERY 8 HOURS
Qty: 90 TABLET | Refills: 0 | Status: SHIPPED | OUTPATIENT
Start: 2020-06-18 | End: 2020-07-10 | Stop reason: SDUPTHER

## 2020-06-10 ASSESSMENT — ENCOUNTER SYMPTOMS
GASTROINTESTINAL NEGATIVE: 1
EYES NEGATIVE: 1
SHORTNESS OF BREATH: 1

## 2020-06-10 NOTE — PROGRESS NOTES
tolerance/dependence & alternative treatments discussed., No signs of potential drug abuse or diversion identified. , Assessed functional status., Obtaining appropriate analgesic effect of treatment. Mckenzie Valenzuela, APRN - CNP)  Review ofOARRS does not show any aberrant prescription behavior. Medication is helping the patient stay active. Patient denies any side effects and reports adequate analgesia. No sign of misuse/abuse. When was thelast UDS:   2-          Was the UDS appropriate:    INCONSISTENT with medications provided:   BUTRANS (BUPRENORPHINE) : based on the absence of buprenorphine   and metabolites   Record/Diagnostics Review:      As above, I did review the imaging      2/21/2020 10:59 PM - Bull, Mhpn Incoming Lab Results From [employer]     Component Value Ref Range & Units Status Collected Lab   Pain Management Drug Panel Interp, Urine Inconsistent   Final 02/17/2020  1:15 PM [facility]   (NOTE)   ________________________________________________________________   DRUGS EXPECTED:   NUCYNTA (TAPENTADOL) [2/17/20]   BUTRANS (BUPRENORPHINE) [REMOVED THIS AM]   ________________________________________________________________   CONSISTENT with medications provided:   NUCYNTA (TAPENTADOL) : based on tapentadol, tapentadol-o-sulfate   ________________________________________________________________   INCONSISTENT with medications provided:   BUTRANS (BUPRENORPHINE) : based on the absence of buprenorphine   and metabolites          Past Medical History:   Diagnosis Date    Asthma     Depression     Headache(784.0)     Klinefelter syndrome        Past Surgical History:   Procedure Laterality Date    UPPP UVULOPALATOPHARYGOPLASTY         Allergies   Allergen Reactions    Food      Bananas, eggs, milk, cherries    Seasonal      Hay fever           Current Outpatient Medications:     tapentadol (NUCYNTA) 50 MG TABS, Take 1 tablet by mouth every 8 hours for 30 days. , Disp: 90 tablet, Rfl: 0  

## 2020-07-10 ENCOUNTER — HOSPITAL ENCOUNTER (OUTPATIENT)
Dept: PAIN MANAGEMENT | Age: 35
Discharge: HOME | End: 2020-07-10
Payer: MEDICAID

## 2020-07-10 PROCEDURE — 99441 PR PHYS/QHP TELEPHONE EVALUATION 5-10 MIN: CPT | Performed by: NURSE PRACTITIONER

## 2020-07-10 PROCEDURE — 99213 OFFICE O/P EST LOW 20 MIN: CPT

## 2020-07-10 RX ORDER — TAPENTADOL HYDROCHLORIDE 50 MG/1
50 TABLET, FILM COATED ORAL EVERY 8 HOURS
Qty: 90 TABLET | Refills: 0 | Status: SHIPPED | OUTPATIENT
Start: 2020-07-17 | End: 2020-08-06 | Stop reason: SDUPTHER

## 2020-07-10 ASSESSMENT — ENCOUNTER SYMPTOMS
PHOTOPHOBIA: 1
SHORTNESS OF BREATH: 1
BACK PAIN: 1
GASTROINTESTINAL NEGATIVE: 1

## 2020-07-10 NOTE — PROGRESS NOTES
tolerance/dependence & alternative treatments discussed., No signs of potential drug abuse or diversion identified. , Assessed functional status., Obtaining appropriate analgesic effect of treatment. Ethan Saba, APRN - CNP)  Review ofOARRS does not show any aberrant prescription behavior. Medication is helping the patient stay active. Patient denies any side effects and reports adequate analgesia. No sign of misuse/abuse.         When was thelast UDS:             Was the UDS appropriate:      Record/Diagnostics Review:      As above, I did review the imaging      2/21/2020 10:59 PM - [facility] Incoming Lab Results From [place]     Component Value Ref Range & Units Status Collected Lab   Pain Management Drug Panel Interp, Urine Inconsistent   Final 02/17/2020  1:15 PM [facility]   (NOTE)   ________________________________________________________________   DRUGS EXPECTED:   NUCYNTA (TAPENTADOL) [2/17/20]   BUTRANS (BUPRENORPHINE) [REMOVED THIS AM]   ________________________________________________________________   CONSISTENT with medications provided:   NUCYNTA (TAPENTADOL) : based on tapentadol, tapentadol-o-sulfate   ________________________________________________________________   INCONSISTENT with medications provided:   BUTRANS (BUPRENORPHINE) : based on the absence of buprenorphine   and metabolites   ________________________________________________________________   INTERPRETIVE INFORMATION: Pain Mgt Abel, Mass Spec/EMIT, Ur,                            Interp   Interpretation depends on accuracy and completeness of          Past Medical History:   Diagnosis Date    Asthma     Depression     Headache(784.0)     Klinefelter syndrome        Past Surgical History:   Procedure Laterality Date    UPPP UVULOPALATOPHARYGOPLASTY         Allergies   Allergen Reactions    Food      Bananas, eggs, milk, cherries    Seasonal      Hay fever           Current Outpatient Medications:     tapentadol (NUCYNTA) 50 MG TABS, Take 1 tablet by mouth every 8 hours for 30 days. , Disp: 90 tablet, Rfl: 0    anastrozole (ARIMIDEX) 1 MG tablet, Take 1 mg by mouth daily takes 1/2 tab, Disp: , Rfl:     DULoxetine (CYMBALTA) 20 MG extended release capsule, TAKE 1 CAPSULE BY MOUTH ONCE A DAY, Disp: , Rfl: 2    divalproex (DEPAKOTE) 250 MG DR tablet, TAKE ONE TABLET BY MOUTH TWICE DAILY, Disp: , Rfl: 0    nadolol (CORGARD) 40 MG tablet, 1/4-1/2 AS DIRECTED FOR TREMOR, Disp: , Rfl: 5    loratadine-pseudoephedrine (CLARITIN-D 12 HOUR) 5-120 MG per extended release tablet, Take 1 tablet by mouth 2 times daily, Disp: , Rfl:     NONFORMULARY, New rx testosterone implant, Disp: , Rfl:     VENTOLIN  (90 Base) MCG/ACT inhaler, TWO SPRAYS BY MOUTH FOUR TIMES A DAY AS NEEDED, Disp: , Rfl: 5    baclofen (LIORESAL) 10 MG tablet, Take 10 mg by mouth 3 times daily, Disp: , Rfl:     cyclobenzaprine (FLEXERIL) 10 MG tablet, , Disp: , Rfl:     Family History   Problem Relation Age of Onset    High Blood Pressure Father     Mental Illness Sister     High Blood Pressure Paternal Grandmother     Cancer Paternal Grandmother     Heart Disease Paternal Grandfather     High Blood Pressure Paternal Grandfather     Stroke Paternal Grandfather        Social History     Socioeconomic History    Marital status: Single     Spouse name: Not on file    Number of children: Not on file    Years of education: Not on file    Highest education level: Not on file   Occupational History    Occupation: disability   Social Needs    Financial resource strain: Not on file    Food insecurity     Worry: Not on file     Inability: Not on file   [employer] needs     Medical: Not on file     Non-medical: Not on file   Tobacco Use    Smoking status: Never Smoker    Smokeless tobacco: Never Used   Substance and Sexual Activity    Alcohol use: Yes     Comment: rare , hard cider or wine    Drug use: No    Sexual activity: Never   Lifestyle    Physical activity     Days per week: Not on file     Minutes per session: Not on file    Stress: Not on file   Relationships    Social connections     Talks on phone: Not on file     Gets together: Not on file     Attends [employer] service: Not on file     Active member of club or organization: Not on file     Attends meetings of clubs or organizations: Not on file     Relationship status: Not on file    Intimate partner violence     Fear of current or ex partner: Not on file     Emotionally abused: Not on file     Physically abused: Not on file     Forced sexual activity: Not on file   Other Topics Concern    Not on file   Social History Narrative    Not on file         Review of Systems:  Review of Systems   Constitution: Negative. HENT: Negative. Eyes: Positive for photophobia. Cardiovascular: Negative. Respiratory: Positive for shortness of breath. Endocrine: Negative. Hematologic/Lymphatic: Negative. Skin: Negative. Musculoskeletal: Positive for back pain. Gastrointestinal: Negative. Genitourinary: Negative. Neurological: Positive for headaches. Psychiatric/Behavioral: Positive for depression. Managing         Physical Exam:    Physical Exam  HENT:      Head:     Neurological:      Mental Status: He is alert. Psychiatric:         Mood and Affect: Mood normal.         Thought Content:  Thought content normal.           Assessment:      2/21/2020 10:59 PM - Bull, [facility] Incoming Lab Results From [facility]     Component Value Ref Range & Units Status Collected Lab   Pain Management Drug Panel Interp, Urine Inconsistent   Final 02/17/2020  1:15 PM [facility]   (NOTE)   ________________________________________________________________   DRUGS EXPECTED:   NUCYNTA (TAPENTADOL) [2/17/20]   BUTRANS (BUPRENORPHINE) [REMOVED THIS AM]   ________________________________________________________________   CONSISTENT with medications provided:   NUCYNTA (TAPENTADOL) : based on tapentadol, tapentadol-o-sulfate   ________________________________________________________________   INCONSISTENT with medications provided:   BUTRANS (BUPRENORPHINE) : based on the absence of buprenorphine   and metabolites   ________________________________________________________________   INTERPRETIVE INFORMATION: Pain Mgt Abel, Mass Spec/EMIT, Ur,                            Interp   Interpretation depends on accuracy and completeness of            Treatment Plan:  DISCUSSION: Treatment options discussed withpatient and all questions answered to patient's satisfaction. Possible side effects, risk of tolerance and or dependence and alternative treatments discussed    Obtaining appropriate analgesic effect of treatment   No signs of potential drug abuse or diversion identified    [x] Ill effects of being on chronic pain medications such as sleep disturbances, respiratory depression, hormonal changes, withdrawal symptoms, chronic opioid dependence and tolerance as well as risk of taking opioids with Benzodiazepines and taking opioids along with alcohol,  werediscussed with patient. I had asked the patient to minimize medication use and utilize pain medications only for uncontrolled rest pain or pain with exertional activities. I advised patient not to self-escalate painmedications without consulting with us. At each of patient's future visits we will try to taper pain medications, while adjusting the adjunct medications, and re-evaluating for Physical Therapy to improve spinal andjoint strength. We will continue to have discussions to decrease pain medications as tolerated. Counseled patient on effects their pain medication and /or their medical condition mayhave on their  ability to drive or operate machinery.  Instructed not to drive or operate machinery if drowsy     I also discussed with the patient regarding the dangers of combining narcotic pain medication with tranquilizers, alcohol or illegal drugs or taking the medication any way other than prescribed. The dangers were discussed  including respiratory depression and death. Patient was told to tell  all  physicians regarding the medications he is getting from pain clinic. Patient is warned not to take any unprescribed medications over-the-countermedications that can depress breathing . Patient is advised to talk to the pharmacist or physicians if planning to take any over-the-counter medications before  takeing them. Patient is strongly advised to avoid tranquilizers or  relaxants, illegal drugs  or any medications that can depress breathing  Patient is also advised to tell us if there is any changes in their medications from other physicians.     Location:  patient  at   home    , provider working from home  Phone call: 7 minutes  TREATMENT OPTIONS:       Medication Agreement Requirements Met  Continue Opioid therapy  Script written for nucynta  Follow up appointment made

## 2020-08-06 ENCOUNTER — HOSPITAL ENCOUNTER (OUTPATIENT)
Dept: PAIN MANAGEMENT | Age: 35
Discharge: HOME | End: 2020-08-06
Payer: MEDICAID

## 2020-08-06 PROCEDURE — 99213 OFFICE O/P EST LOW 20 MIN: CPT

## 2020-08-06 PROCEDURE — 99441 PR PHYS/QHP TELEPHONE EVALUATION 5-10 MIN: CPT | Performed by: NURSE PRACTITIONER

## 2020-08-06 RX ORDER — BUPRENORPHINE 5 UG/H
1 PATCH, EXTENDED RELEASE TRANSDERMAL WEEKLY
Qty: 4 PATCH | Refills: 0 | Status: SHIPPED | OUTPATIENT
Start: 2020-08-07 | End: 2020-09-28 | Stop reason: SDUPTHER

## 2020-08-06 RX ORDER — TAPENTADOL HYDROCHLORIDE 50 MG/1
50 TABLET, FILM COATED ORAL EVERY 8 HOURS
Qty: 90 TABLET | Refills: 0 | Status: SHIPPED | OUTPATIENT
Start: 2020-08-17 | End: 2020-09-14 | Stop reason: SDUPTHER

## 2020-08-06 ASSESSMENT — ENCOUNTER SYMPTOMS
BACK PAIN: 1
EYES NEGATIVE: 1
SHORTNESS OF BREATH: 1
GASTROINTESTINAL NEGATIVE: 1

## 2020-08-06 NOTE — PROGRESS NOTES
[facility] PROGRESS NOTE      Patient  Phone call to   review Medication Agreement    Chief Complaint:  headache      He c/o headache frontal area with no changes. He has long standing history of migraine headaches with previous treatment at  [facility] headache clinic , and seen by neurologists in the past. He had been on morphine and methadone in 2017 and had been on topamax. [facility] tried aimovig for 2 months which did not improve his headaches. Prior treatments included  Acupressure  which was in [facility] several years ( 15) years ago. Chiropractic treatment a few different Chiropractor visits, with neck adjustments x 15 years    Sleep is interrupted due to headaches, Headaches are daily and ease up at times. His headaches are managed with butrans patch and nucynta. He has been doing some walking, No Ed visits,  Migraine    This is a chronic problem. The current episode started more than 1 year ago. The problem occurs constantly. The problem has been unchanged. The pain does not radiate. The pain quality is similar to prior headaches. Quality: sharp, stabbing. The pain is at a severity of 6/10. The pain is moderate. Associated symptoms include back pain. The symptoms are aggravated by bright light. He has tried oral narcotics and cold packs for the symptoms. Patient denies any new neurological symptoms. No bowel or bladder incontinence, no weakness, and no falling.     Any new diagnostic workup: [x] no  [] yes [] Xray [] CT scan [] MRI [] DEXA scan     [] Other                    Treatment goals: reduce epain  Functional status:       Aberrancy:   Any alcoholic beverages    no   Any illegal drugs  no       Analgesia:6      Adverse  Effects :none    ADL;s :walking        Pill count: appropriate fill date 8- for nucynta and 8-7-2020 for butrans patch    Morphine equivalent dose as reported on OARRS:60.12  Periodic Controlled Substance Monitoring: Possible medication side effects, risk of tolerance/dependence & alternative treatments discussed., No signs of potential drug abuse or diversion identified. , Assessed functional status., Obtaining appropriate analgesic effect of treatment. Carole Sheffield, APRN - CNP)  Review ofOARRS does not show any aberrant prescription behavior. Medication is helping the patient stay active. Patient denies any side effects and reports adequate analgesia. No sign of misuse/abuse.         When was thelast UDS:   2-`          Was the UDS appropriate:yes      Record/Diagnostics Review:      As above, I did review the imaging    2/21/2020 10:59 PM - Bull, Kash Incoming Lab Results From [facility]     Component  Value  Ref Range & Units  Status  Collected  Lab    Pain Management Drug Panel Interp, Urine  Inconsistent   Final  02/17/2020  1:15 PM  [facility]    (NOTE)   ________________________________________________________________   DRUGS EXPECTED:   NUCYNTA (TAPENTADOL) [2/17/20]   BUTRANS (BUPRENORPHINE) [REMOVED THIS AM]   ________________________________________________________________   CONSISTENT with medications provided:   Alverna George (TAPENTADOL) : based on tapentadol, tapentadol-o-sulfate   ________________________________________________________________   INCONSISTENT with medications provided:   BUTRANS (BUPRENORPHINE) : based on the absence of buprenorphine   and metabolites   ________________________________________________________________   INTERPRETIVE INFORMATION: Pain Mgt Abel, Mass Spec/EMIT, Ur,                            Interp   Interpretation depends on accuracy and completeness          Past Medical History:   Diagnosis Date    Asthma     Depression     Headache(784.0)     Klinefelter syndrome        Past Surgical History:   Procedure Laterality Date    UPPP UVULOPALATOPHARYGOPLASTY         Allergies   Allergen Reactions    Food      Bananas, eggs, milk, cherries    Seasonal      Hay fever           Current Outpatient Relevant Medications    tapentadol (NUCYNTA) 50 MG TABS (Start on 8/17/2020)    Cervicalgia    Relevant Medications    tapentadol (NUCYNTA) 50 MG TABS (Start on 8/17/2020)    Iban Medicus 5 MCG/HR PTWK (Start on 8/7/2020)    Cervical spondylosis without myelopathy    Relevant Medications    tapentadol (NUCYNTA) 50 MG TABS (Start on 8/17/2020)    Iban Medicus 5 MCG/HR PTWK (Start on 8/7/2020)    Bilateral headaches    Relevant Medications    tapentadol (NUCYNTA) 50 MG TABS (Start on 8/17/2020)    Iban Medicus 5 MCG/HR PTWK (Start on 8/7/2020)          Treatment Plan:  DISCUSSION: Treatment options discussed withpatient and all questions answered to patient's satisfaction. Possible side effects, risk of tolerance and or dependence and alternative treatments discussed    Obtaining appropriate analgesic effect of treatment   No signs of potential drug abuse or diversion identified    [x] Ill effects of being on chronic pain medications such as sleep disturbances, respiratory depression, hormonal changes, withdrawal symptoms, chronic opioid dependence and tolerance as well as risk of taking opioids with Benzodiazepines and taking opioids along with alcohol,  werediscussed with patient. I had asked the patient to minimize medication use and utilize pain medications only for uncontrolled rest pain or pain with exertional activities. I advised patient not to self-escalate painmedications without consulting with us. At each of patient's future visits we will try to taper pain medications, while adjusting the adjunct medications, and re-evaluating for Physical Therapy to improve spinal andjoint strength. We will continue to have discussions to decrease pain medications as tolerated. Counseled patient on effects their pain medication and /or their medical condition mayhave on their  ability to drive or operate machinery.  Instructed not to drive or operate machinery if drowsy     I also discussed with the patient regarding the dangers of combining narcotic pain medication with tranquilizers, alcohol or illegal drugs or taking the medication any way other than prescribed. The dangers were discussed  including respiratory depression and death. Patient was told to tell  all  physicians regarding the medications he is getting from pain clinic. Patient is warned not to take any unprescribed medications over-the-countermedications that can depress breathing . Patient is advised to talk to the pharmacist or physicians if planning to take any over-the-counter medications before  takeing them. Patient is strongly advised to avoid tranquilizers or  relaxants, illegal drugs  or any medications that can depress breathing  Patient is also advised to tell us if there is any changes in their medications from other physicians.     Location:  patient  at   home  ,provider working from home  Phone call: 7 minutes    Will do prior auth on nucynta before it is due on 8-    TREATMENT OPTIONS:       Medication Agreement Requirements Met  Continue Opioid therapy  Script written for  Nucynta, butrans patch  Follow up appointment made

## 2020-09-14 ENCOUNTER — HOSPITAL ENCOUNTER (OUTPATIENT)
Dept: PAIN MANAGEMENT | Age: 35
Discharge: HOME | End: 2020-09-14
Payer: MEDICAID

## 2020-09-14 PROCEDURE — 99213 OFFICE O/P EST LOW 20 MIN: CPT

## 2020-09-14 PROCEDURE — 99212 OFFICE O/P EST SF 10 MIN: CPT | Performed by: NURSE PRACTITIONER

## 2020-09-14 RX ORDER — TAPENTADOL HYDROCHLORIDE 50 MG/1
50 TABLET, FILM COATED ORAL EVERY 8 HOURS
Qty: 90 TABLET | Refills: 0 | Status: SHIPPED | OUTPATIENT
Start: 2020-09-16 | End: 2020-10-21 | Stop reason: SDUPTHER

## 2020-09-14 ASSESSMENT — ENCOUNTER SYMPTOMS
GASTROINTESTINAL NEGATIVE: 1
PHOTOPHOBIA: 1
SHORTNESS OF BREATH: 1

## 2020-09-14 NOTE — PROGRESS NOTES
[facility] PROGRESS NOTE      Patient  Phone call to  review Medication Agreement    Chief Complaint: headache  He c/o frontal headache which has not changed  He had previous treatment at  [facility] headache clinic , and seen by neurologists in the past. He had been on morphine and methadone in 2017 and had been on topamax. Shirlene Hernandez tried aimovig for 2 months which did not improve his headaches. Previoustreatments included acupressure   which was in [place] several years ( 15) years ago. Chiropractic treatment a few different Chiropractor visits, with neck adjustments x 15 years He has long standing history of migraine headaches, His headaches are unchanged, His headache pain is managed with nucynta and butrans patch,He is sleeping well, He is doing some walking He has had no ED visits. 2/21/2020 10:59 PM - Bull, [facility] Incoming Lab Results From [facility]     Component  Value  Ref Range & Units  Status  Collected  Lab    Pain Management Drug Panel Interp, Urine  Inconsistent   Final  02/17/2020  1:15 PM  [facility]    (NOTE)   ________________________________________________________________   DRUGS EXPECTED:   NUCYNTA (TAPENTADOL) [2/17/20]   BUTRANS (BUPRENORPHINE) [REMOVED THIS AM]   ________________________________________________________________   CONSISTENT with medications provided:   NUCYNTA (TAPENTADOL) : based on tapentadol, tapentadol-o-sulfate   ________________________________________________________________   INCONSISTENT with medications provided:   BUTRANS (BUPRENORPHINE) : based on the absence of buprenorphine   and metabolites   ________________________________________________________________   INTERPRETIVE INFORMATION: Pain Mgt Abel, Mass Spec/EMIT, Ur,                            Interp   Interpretation depends on accuracy and completeness of        Migraine    This is a chronic problem. The current episode started more than 1 year ago.  The problem has been unchanged. The pain is located in the bilateral and frontal region. The pain does not radiate. The quality of the pain is described as aching. The pain is at a severity of 7/10. Associated symptoms include phonophobia and photophobia. The symptoms are aggravated by bright light. He has tried oral narcotics, darkened room and cold packs for the symptoms. Patient denies any new neurological symptoms. No bowel or bladder incontinence, no weakness, and no falling. Any new diagnostic workup: [x] no  [] yes [] Xray [] CT scan [] MRI [] DEXA scan     [] Other                    Treatment goals:  Functional status: to do more    Aberrancy:   Any alcoholic beverages   no  Any illegal drugs   no      Analgesia:7      Adverse  Effects :some itching from nucynta at times,, tired      ADL;s :walking        Pill count: appropriate fill date 9- for nucynta    Morphine equivalent dose as reported on OARRS:60.12  Periodic Controlled Substance Monitoring: Possible medication side effects, risk of tolerance/dependence & alternative treatments discussed., No signs of potential drug abuse or diversion identified. , Assessed functional status., Obtaining appropriate analgesic effect of treatment. Bhupinder Peace, APRN - CNP)  Review ofOARRS does not show any aberrant prescription behavior. Medication is helping the patient stay active. Patient denies any side effects and reports adequate analgesia. No sign of misuse/abuse.       When was thelast UDS: 2-            Was the UDS appropriate:      Record/Diagnostics Review:      As above, I did review the imaging    2/21/2020 10:59 PM - Bull, [facility] Incoming Lab Results From [email or web address]     Component  Value  Ref Range & Units  Status  Collected  Lab    Pain Management Drug Panel Interp, Urine  Inconsistent   Final  02/17/2020  1:15 PM  [facility]    (NOTE)   ________________________________________________________________   DRUGS EXPECTED:   NUCYNTA (TAPENTADOL) [2/17/20]   Dylan Pina (BUPRENORPHINE) [REMOVED THIS AM]   ________________________________________________________________   CONSISTENT with medications provided:   NUCYNTA (TAPENTADOL) : based on tapentadol, tapentadol-o-sulfate   ________________________________________________________________   INCONSISTENT with medications provided:   BUTRANS (BUPRENORPHINE) : based on the absence of buprenorphine   and metabolites   ________________________________________________________________   INTERPRETIVE INFORMATION: Pain Mgt Abel, Mass Spec/EMIT, Ur,                            Interp   Interpretation depends on accuracy and completeness of            Past Medical History:   Diagnosis Date    Asthma     Depression     Headache(784.0)     Klinefelter syndrome        Past Surgical History:   Procedure Laterality Date    UPPP UVULOPALATOPHARYGOPLASTY         Allergies   Allergen Reactions    Food      Bananas, eggs, milk, cherries    Seasonal      Hay fever           Current Outpatient Medications:     tapentadol (NUCYNTA) 50 MG TABS, Take 1 tablet by mouth every 8 hours for 30 days. , Disp: 90 tablet, Rfl: 0    anastrozole (ARIMIDEX) 1 MG tablet, Take 1 mg by mouth daily takes 1/2 tab, Disp: , Rfl:     DULoxetine (CYMBALTA) 20 MG extended release capsule, TAKE 1 CAPSULE BY MOUTH ONCE A DAY, Disp: , Rfl: 2    divalproex (DEPAKOTE) 250 MG DR tablet, TAKE ONE TABLET BY MOUTH TWICE DAILY, Disp: , Rfl: 0    nadolol (CORGARD) 40 MG tablet, 1/4-1/2 AS DIRECTED FOR TREMOR, Disp: , Rfl: 5    loratadine-pseudoephedrine (CLARITIN-D 12 HOUR) 5-120 MG per extended release tablet, Take 1 tablet by mouth 2 times daily, Disp: , Rfl:     VENTOLIN  (90 Base) MCG/ACT inhaler, TWO SPRAYS BY MOUTH FOUR TIMES A DAY AS NEEDED, Disp: , Rfl: 5    baclofen (LIORESAL) 10 MG tablet, Take 10 mg by mouth 3 times daily, Disp: , Rfl:     cyclobenzaprine (FLEXERIL) 10 MG tablet, , Disp: , Rfl:     Family History   Problem Relation Age of Onset    High Blood Pressure Father     Mental Illness Sister     High Blood Pressure Paternal Grandmother     Cancer Paternal Grandmother     Heart Disease Paternal Grandfather     High Blood Pressure Paternal Grandfather     Stroke Paternal Grandfather        Social History     Socioeconomic History    Marital status: Single     Spouse name: Not on file    Number of children: Not on file    Years of education: Not on file    Highest education level: Not on file   Occupational History    Occupation: disability   Social Needs    Financial resource strain: Not on file    Food insecurity     Worry: Not on file     Inability: Not on file   [employer] needs     Medical: Not on file     Non-medical: Not on file   Tobacco Use    Smoking status: Never Smoker    Smokeless tobacco: Never Used   Substance and Sexual Activity    Alcohol use: Yes     Comment: rare , hard cider or wine    Drug use: No    Sexual activity: Never   Lifestyle    Physical activity     Days per week: Not on file     Minutes per session: Not on file    Stress: Not on file   Relationships    Social connections     Talks on phone: Not on file     Gets together: Not on file     Attends Sabianism service: Not on file     Active member of club or organization: Not on file     Attends meetings of clubs or organizations: Not on file     Relationship status: Not on file    Intimate partner violence     Fear of current or ex partner: Not on file     Emotionally abused: Not on file     Physically abused: Not on file     Forced sexual activity: Not on file   Other Topics Concern    Not on file   Social History Narrative    Not on file         Review of Systems:  Review of Systems   Constitution: Negative. HENT: Negative. Eyes: Positive for photophobia. Cardiovascular: Negative. Respiratory: Positive for shortness of breath. Endocrine: Negative. Hematologic/Lymphatic: Negative. Skin: Negative. Musculoskeletal: Negative. Gastrointestinal: Negative. Genitourinary: Negative. Neurological: Positive for headaches. Psychiatric/Behavioral: Positive for depression. Managing         Physical Exam:    Physical Exam  HENT:      Head:     Neurological:      Mental Status: He is alert and oriented to person, place, and time. Psychiatric:         Mood and Affect: Mood normal.         Thought Content: Thought content normal.           Assessment:    Problem List Items Addressed This Visit     Intractable migraine without status migrainosus    Relevant Medications    tapentadol (NUCYNTA) 50 MG TABS (Start on 9/16/2020)    Generalized headaches    Relevant Medications    tapentadol (NUCYNTA) 50 MG TABS (Start on 9/16/2020)    Encounter for long-term opiate analgesic use - Primary    Relevant Medications    tapentadol (NUCYNTA) 50 MG TABS (Start on 9/16/2020)    Cervicalgia    Relevant Medications    tapentadol (NUCYNTA) 50 MG TABS (Start on 9/16/2020)    Cervical spondylosis without myelopathy    Relevant Medications    tapentadol (NUCYNTA) 50 MG TABS (Start on 9/16/2020)    Bilateral headaches    Relevant Medications    tapentadol (NUCYNTA) 50 MG TABS (Start on 9/16/2020)              Treatment Plan:  DISCUSSION: Treatment options discussed withpatient and all questions answered to patient's satisfaction. Possible side effects, risk of tolerance and or dependence and alternative treatments discussed    Obtaining appropriate analgesic effect of treatment   No signs of potential drug abuse or diversion identified    [x] Ill effects of being on chronic pain medications such as sleep disturbances, respiratory depression, hormonal changes, withdrawal symptoms, chronic opioid dependence and tolerance as well as risk of taking opioids with Benzodiazepines and taking opioids along with alcohol,  werediscussed with patient.  I had asked the patient to minimize medication use and utilize pain medications only for uncontrolled rest pain or pain with exertional activities. I advised patient not to self-escalate painmedications without consulting with us. At each of patient's future visits we will try to taper pain medications, while adjusting the adjunct medications, and re-evaluating for Physical Therapy to improve spinal andjoint strength. We will continue to have discussions to decrease pain medications as tolerated. Counseled patient on effects their pain medication and /or their medical condition mayhave on their  ability to drive or operate machinery. Instructed not to drive or operate machinery if drowsy     I also discussed with the patient regarding the dangers of combining narcotic pain medication with tranquilizers, alcohol or illegal drugs or taking the medication any way other than prescribed. The dangers were discussed  including respiratory depression and death. Patient was told to tell  all  physicians regarding the medications he is getting from pain clinic. Patient is warned not to take any unprescribed medications over-the-countermedications that can depress breathing . Patient is advised to talk to the pharmacist or physicians if planning to take any over-the-counter medications before  takeing them. Patient is strongly advised to avoid tranquilizers or  relaxants, illegal drugs  or any medications that can depress breathing  Patient is also advised to tell us if there is any changes in their medications from other physicians. 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  His Butrans patch is due 10-3-2020, will e-prescribe closer to refill date so it does not     TREATMENT OPTIONS:       Medication Agreement Requirements Met  Continue Opioid therapy  Script written for  nucynta  Follow up appointment made

## 2020-09-28 DIAGNOSIS — R51.9 GENERALIZED HEADACHES: ICD-10-CM

## 2020-09-28 DIAGNOSIS — Q98.4 KLINEFELTER SYNDROME: ICD-10-CM

## 2020-09-28 DIAGNOSIS — R51.9 BILATERAL HEADACHES: ICD-10-CM

## 2020-09-28 DIAGNOSIS — M47.812 CERVICAL SPONDYLOSIS WITHOUT MYELOPATHY: ICD-10-CM

## 2020-09-28 DIAGNOSIS — M54.2 CERVICALGIA: ICD-10-CM

## 2020-09-28 RX ORDER — BUPRENORPHINE 5 UG/H
1 PATCH, EXTENDED RELEASE TRANSDERMAL WEEKLY
Qty: 4 PATCH | Refills: 0 | Status: SHIPPED | OUTPATIENT
Start: 2020-10-02 | End: 2021-03-16 | Stop reason: SDUPTHER

## 2020-10-13 ENCOUNTER — HOSPITAL ENCOUNTER (OUTPATIENT)
Dept: PAIN MANAGEMENT | Age: 35
Discharge: HOME | End: 2020-10-13
Payer: MEDICAID

## 2020-10-21 ENCOUNTER — HOSPITAL ENCOUNTER (OUTPATIENT)
Dept: PAIN MANAGEMENT | Age: 35
Discharge: HOME | End: 2020-10-21
Payer: MEDICAID

## 2020-10-21 PROCEDURE — 99214 OFFICE O/P EST MOD 30 MIN: CPT | Performed by: PAIN MEDICINE

## 2020-10-21 PROCEDURE — 99213 OFFICE O/P EST LOW 20 MIN: CPT

## 2020-10-21 RX ORDER — TAPENTADOL HYDROCHLORIDE 50 MG/1
50 TABLET, FILM COATED ORAL EVERY 8 HOURS
Qty: 90 TABLET | Refills: 0 | Status: SHIPPED | OUTPATIENT
Start: 2020-10-21 | End: 2020-11-20 | Stop reason: SDUPTHER

## 2020-10-21 ASSESSMENT — ENCOUNTER SYMPTOMS
BACK PAIN: 0
EYE PAIN: 0
ABDOMINAL PAIN: 0
VOMITING: 0
GASTROINTESTINAL NEGATIVE: 1
EYE WATERING: 0
RESPIRATORY NEGATIVE: 1
ALLERGIC/IMMUNOLOGIC NEGATIVE: 1
SHORTNESS OF BREATH: 0
CHOKING: 0
BLURRED VISION: 0
DIARRHEA: 0
PHOTOPHOBIA: 1
COUGH: 0
EYE REDNESS: 0

## 2020-10-21 NOTE — PROGRESS NOTES
darkened room, oral narcotics, antidepressants, beta blockers, triptans and NSAIDs (CGRP antagonists, anticonvulsants) for the symptoms. The treatment provided no relief. Alleviating factors:ice and rest in a dark room  Lifestyle changes experienced with pain: Prevents or limits ADLs, Increases w/activity. Mood changes,Depression  Patient currently unemployed. Physical therapy did not help the pain. Are you under psychological counseling at present: No  Goals for treatment include:  Decrease in pain  Enjoy daily and recreational activities, return to previous status. Patient relates current medications are helping the pain. Patient reports taking pain medications as prescribed, denies obtaining medications from different sources and denies use of illegal drugs. Patient denies side effects from medications like nausea, vomiting, constipation or drowsiness. Patient reports current activities of daily living ar possible due to medications and would like to continue them. Nucynta causing itchiness  ACTIVITY/SOCIAL/EMOTIONAL:  Sleep Pattern: 8 hours per night. generally restful sleep  Home Exercises: daily walking  Activity:not significantly changed  Emotional Issues: depression.    Currently seeing a Psychiatrist or Psychologist:  No     ADVERSE MEDICATION EFFECTS:   Nausea and vomiting: no   Constipation: no-Undercontrol-: not applicable  Dizziness/drowsy/sleepy--no  Urinary Retention: no    ABERRANT BEHAVIORS SINCE LAST VISIT  Lost rx/pills:------------------------------------------ no  Taking  medication as prescribed: ----------- yes  Urine Drug Screen ---------------------------------  yes  Recent ER visits: -------------------------------------No  Pill count is appropriate: ---------------------------yes   Refills for prescriptions appropriate:---------- yes      Past Medical History:   Diagnosis Date    Asthma     Depression     Headache(784.0)     Klinefelter syndrome        Past Surgical History:   Procedure Laterality Date    UPPP UVULOPALATOPHARYGOPLASTY         Family History   Problem Relation Age of Onset    High Blood Pressure Father     Mental Illness Sister     High Blood Pressure Paternal Grandmother     Cancer Paternal Grandmother     Heart Disease Paternal Grandfather     High Blood Pressure Paternal Grandfather     Stroke Paternal Grandfather        Social History     Socioeconomic History    Marital status: Single     Spouse name: None    Number of children: None    Years of education: None    Highest education level: None   Occupational History    Occupation: disability   Social Needs    Financial resource strain: None    Food insecurity     Worry: None     Inability: None    Transportation needs     Medical: None     Non-medical: None   Tobacco Use    Smoking status: Never Smoker    Smokeless tobacco: Never Used   Substance and Sexual Activity    Alcohol use: Yes     Comment: rare , hard cider or wine    Drug use: No    Sexual activity: Never   Lifestyle    Physical activity     Days per week: None     Minutes per session: None    Stress: None   Relationships    Social connections     Talks on phone: None     Gets together: None     Attends Holiness service: None     Active member of club or organization: None     Attends meetings of clubs or organizations: None     Relationship status: None    Intimate partner violence     Fear of current or ex partner: None     Emotionally abused: None     Physically abused: None     Forced sexual activity: None   Other Topics Concern    None   Social History Narrative    None       Allergies   Allergen Reactions    Food      Bananas, eggs, milk, cherries    Seasonal      Hay fever         Current Outpatient Medications on File Prior to Encounter   Medication Sig Dispense Refill    BUTRANS 5 MCG/HR PTWK Place 1 patch onto the skin once a week for 28 days.  4 patch 0    anastrozole (ARIMIDEX) 1 MG tablet Take 1 mg by oriented to person, place, and time. Psychiatric:         Mood and Affect: Mood normal.            DATA:  LAB.:  2/21/2020 10:59 PM - Kash Gottlieb Incoming Lab Results From [facility]     Component  Value  Ref Range & Units  Status  Collected  Lab    Pain Management Drug Panel Interp, Urine  Inconsistent   Final  02/17/2020  1:15 PM  [facility]    (NOTE)   ________________________________________________________________   DRUGS EXPECTED:   NUCYNTA (TAPENTADOL) [2/17/20]   BUTRANS (BUPRENORPHINE) [REMOVED THIS AM]   ________________________________________________________________   CONSISTENT with medications provided:   Jeannette Torres (TAPENTADOL) : based on tapentadol, tapentadol-o-sulfate   ________________________________________________________________   INCONSISTENT with medications provided:   BUTRANS (BUPRENORPHINE) : based on the absence of buprenorphine   and metabolites   ________________________________________________________________        X-Ray reports:     Cervical Spine:  9/21/2016.         Reason for study: Chronic migraines and neck pain         Comparison: None.         Technique: 8 images of the cervical spine were obtained.         Findings:  There is normal alignment without significant alignment shift upon flexion.  There is no acute fracture or subluxation.  The dens is intact. Oblique views demonstrate the neural foramen to be widely patent bilaterally         The vertebral bodies demonstrate normal height.  The intervertebral disc spaces are well maintained.  There is no prevertebral soft tissue swelling.         Impression: Unremarkable cervical spine radiographs.         Final report electronically signed by Micki Wells M.D. on 9/21/2016        Clinical  impression:  1. Bilateral headaches    2. Cervicalgia    3. Cervical spondylosis without myelopathy    4. Klinefelter syndrome    5. Generalized headaches    6. Encounter for long-term opiate analgesic use    7.  Intractable persistent migraine aura without cerebral infarction and without status migrainosus    8. Intractable migraine without status migrainosus, unspecified migraine type    9. Encounter for medication management        Plan of care: We will continue current pain medications  Current medications are being tolerated without any Adverse side effects. Orders Placed This Encounter   Medications    tapentadol (NUCYNTA) 50 MG TABS     Sig: Take 1 tablet by mouth every 8 hours for 30 days. Dispense:  90 tablet     Refill:  0     Reduce doses taken as pain becomes manageable     Urine drug screens have been appropriate. No aberrant activity noted. Analgesia is achieved. Activities of daily living are possible because of medications. Safe use of medications explained to patient. PDMP Monitoring:    Last PDMP [facility] as Reviewed [facility]):  Review User Review Instant Review Result   Donny Swenson 10/21/2020  6:51 AM Reviewed PDMP [1]     Counselling/Preventive measures for pain  Control:    [x]  Spine strengthening exercises are discussed with patient in detail. [x] Ill effects of being on chronic pain medications such as sleep disturbances, hormonal changes, withdrawal symptoms,  chronic opioid dependence and tolerance were discussed with patient. I had asked the patient to minimize medication use and utilize pain medications only for uncontrolled rest pain or pain with exertional activities. I advised patient not to self escalate pain medications without consulting with us. At each of patient's future visits we will try to taper pain medications, while adjusting the adjunct medications, and re-evaluating for Physical Therapy to improve spinal and joint strength. We will continue to have discussions to decrease pain medications as tolerated. I also discussed with the patient regarding the dangers of combining narcotic pain medication with tranquilizers, alcohol or illegal drugs or taking the medication any other than prescribed.  The dangers including the respiratory depression and death. Patient was told to tell  to all  physicians regarding the medications he is getting from pain clinic. Patient is warned not to take any unprescribed medications over-the-counter medications that can depress breathing . Patient is advised to talk to the pharmacist or physicians if planning to take any over-the-counter medications before  takeing them. Patient is strongly advised to avoid tranquilizers or  Relaxants for any medications that can depress breathing or recreational drugs. Patient is also advised to tell us if there is any changes in his medications from other physicians. We discussed the same at today's visit and have not been to implement it, as the patient's pain is not under control with current medications. Decision Making Process : Patient's health history and referral records thoroughly reviewed before focused physical examination and discussion with patient. I have spent 25 mins. Over 50% of today's visit is spent on examining the patient and counseling and coordinating the care. Level of complexity of date to be reviewed is Moderate. The chart date reviewed include the following: Imaging Reports. Summary of Care. Time spent reviewing with patient the below reports:   Medication safety, Treatment options. Level of diagnosis and management options of this case is multiple: involving the following management options: Interventions as needed, medication management as appropriate, future visits, activity modification, heat/ice as needed, Urine drug screen as required. [x]The patient's questions were answered to the best of my abilities. This note was created using voice recognition software. There may be inaccuracies of transcription  that are inadvertently overlooked prior to the signature. There is any questions about the transcription please contact me.     Return in  4 weeks  with physician / CNP  for further plan of treatment. Due to the COVID-19 pandemic and the appropriate interventions by Savannah Williamson, our non-urgent pain management patients will not be seen in the office at this time for their protection and the protection of our staff. To offer continuity of care, their prescriptions will be escribed this month after a careful chart review and review of their OARRS report  Pursuant to the emergency declaration under the [address] and [place] [address] waiver authority and the [employer] Act, this Virtual Visit was conducted, with patient's consent, to reduce the patient's risk of exposure to COVID-19 and provide continuity of care for an established patient. Services were provided through a video synchronous discussion virtually to substitute for in-person appointment. \"  Documentation:  I communicated with the patient and/or health care decision maker about plan of care  Details of this discussion including any medical advice provided: Total Time: minutes: 21-30 minutes    I affirm this is a Patient Initiated Episode with an Established Patient who has not had a related appointment within my department in the past 7 days or scheduled within the next 24 hours.     Electronically signed by Jordon Grissom MD on 10/21/2020 at 12:23 PM

## 2020-11-20 ENCOUNTER — HOSPITAL ENCOUNTER (OUTPATIENT)
Dept: PAIN MANAGEMENT | Age: 35
Discharge: HOME | End: 2020-11-20
Payer: MEDICAID

## 2020-11-20 PROCEDURE — 99213 OFFICE O/P EST LOW 20 MIN: CPT

## 2020-11-20 PROCEDURE — 99212 OFFICE O/P EST SF 10 MIN: CPT | Performed by: NURSE PRACTITIONER

## 2020-11-20 RX ORDER — TAPENTADOL HYDROCHLORIDE 50 MG/1
50 TABLET, FILM COATED ORAL EVERY 8 HOURS
Qty: 90 TABLET | Refills: 0 | Status: SHIPPED | OUTPATIENT
Start: 2020-11-20 | End: 2020-12-17 | Stop reason: SDUPTHER

## 2020-11-20 RX ORDER — BUPRENORPHINE 5 UG/H
1 PATCH TRANSDERMAL WEEKLY
Qty: 4 PATCH | Refills: 0 | Status: SHIPPED | OUTPATIENT
Start: 2020-11-27 | End: 2021-01-19 | Stop reason: SDUPTHER

## 2020-11-20 RX ORDER — DIVALPROEX SODIUM 250 MG/1
TABLET, EXTENDED RELEASE ORAL
COMMUNITY
Start: 2020-11-03 | End: 2020-11-20

## 2020-11-20 RX ORDER — TESTOSTERONE CYPIONATE 200 MG/ML
INJECTION INTRAMUSCULAR
COMMUNITY
Start: 2020-10-28

## 2020-11-20 ASSESSMENT — ENCOUNTER SYMPTOMS
GASTROINTESTINAL NEGATIVE: 1
EYES NEGATIVE: 1
RESPIRATORY NEGATIVE: 1
BACK PAIN: 1

## 2020-11-20 NOTE — PROGRESS NOTES
[facility] PROGRESS NOTE      Patient  Phone call to  review Medication Agreement    Chief Complaint:  He c/o headache left side more in the parietal area, No changes in headache. He has a long standing historytof migraine headaches and has previously been seen at [facility] in [place] had been on morphine and methadone in 2017 and had been on topamax.  He tried aimovig for 2 months which did not improve his headaches. Prior treatments included  Acupressure which was in [facility] several years ( 15) years ago. Chiropractic treatment a few different Chiropractor visits, with neck adjustments x 15 years  He is on nucynta and butrans patch, His sleep is good. He does some walking. No Ed visits. Migraine    This is a chronic problem. The problem occurs constantly. The problem has been unchanged. The pain is located in the left unilateral region. The pain does not radiate. The pain quality is similar to prior headaches. Quality: throbbing. The pain is at a severity of 6/10. Associated symptoms include back pain. The symptoms are aggravated by bright light (sound). He has tried darkened room and oral narcotics (ice) for the symptoms. Patient denies any new neurological symptoms. No bowel or bladder incontinence, no weakness, and no falling. Any new diagnostic workup: [x] no  [] yes [] Xray [] CT scan [] MRI [] DEXA scan     [] Other                 Cervical Spine:  9/21/2016.         Reason for study: Chronic migraines and neck pain         Comparison: None.         Technique: 8 images of the cervical spine were obtained.         Findings:  There is normal alignment without significant alignment shift upon flexion.  There is no acute fracture or subluxation.  The dens is intact.  Oblique views demonstrate the neural foramen to be widely patent bilaterally         The vertebral bodies demonstrate normal height.  The intervertebral disc spaces are well maintained.  There is no prevertebral soft tissue swelling.         Impression: Unremarkable cervical spine radiographs.         Final report electronically signed by Dorothy Pulido M.D. on 9/21/2016 4:21 PM               Treatment goals:  Functional status: make pain more manageable      Aberrancy:   Any alcoholic beverages    no   Any illegal drugs   no      Analgesia:6    Adverse  Effects :some itching, drowsiness  ADL;s :walks      Pill count: appropriate    Morphine equivalent dose as reported on OARRS: 60.12  Periodic Controlled Substance Monitoring: Possible medication side effects, risk of tolerance/dependence & alternative treatments discussed., No signs of potential drug abuse or diversion identified. , Assessed functional status., Obtaining appropriate analgesic effect of treatment. Angela Chaudhary, APRN - CNP)  Review ofOARRS does not show any aberrant prescription behavior. Medication is helping the patient stay active. Patient denies any side effects and reports adequate analgesia. No sign of misuse/abuse.         When was thelast UDS:  2-           Was the UDS appropriate:yes      Record/Diagnostics Review:      As above, I did review the imaging  2/21/2020 10:59 PM - Bull, Kash Incoming Lab Results From [facility]     Component  Value  Ref Range & Units  Status  Collected  Lab    Pain Management Drug Panel Interp, Urine  Inconsistent   Final  02/17/2020  1:15 PM  [facility]    (NOTE)   ________________________________________________________________   DRUGS EXPECTED:   NUCYNTA (TAPENTADOL) [2/17/20]   BUTRANS (BUPRENORPHINE) [REMOVED THIS AM]   ________________________________________________________________   CONSISTENT with medications provided:   Radha Rush (TAPENTADOL) : based on tapentadol, tapentadol-o-sulfate   ________________________________________________________________   INCONSISTENT with medications provided:   BUTRANS (BUPRENORPHINE) : based on the absence of buprenorphine   and metabolites   ________________________________________________________________   INTERPRETIVE INFORMATION: Pain Mgt Abel, Mass Spec/EMIT, Ur,                            Interp   Interpretation depends on accuracy and completeness of patient   medication information submitted by client. 6-Acetylmorphine, Ur  Not Detected                Past Medical History:   Diagnosis Date    Asthma     Depression     Headache(784.0)     Klinefelter syndrome        Past Surgical History:   Procedure Laterality Date    UPPP UVULOPALATOPHARYGOPLASTY         Allergies   Allergen Reactions    Food      Bananas, eggs, milk, cherries    Seasonal      Hay fever           Current Outpatient Medications:     testosterone cypionate (DEPOTESTOTERONE CYPIONATE) 200 MG/ML injection, INJECT 1.25 MILLILITER INTRAMUSCULARLY EVERY 2 WEEKS, Disp: , Rfl:     tapentadol (NUCYNTA) 50 MG TABS, Take 1 tablet by mouth every 8 hours for 30 days. , Disp: 90 tablet, Rfl: 0    anastrozole (ARIMIDEX) 1 MG tablet, Take 1 mg by mouth daily takes 1/2 tab, Disp: , Rfl:     DULoxetine (CYMBALTA) 20 MG extended release capsule, TAKE 1 CAPSULE BY MOUTH ONCE A DAY, Disp: , Rfl: 2    loratadine-pseudoephedrine (CLARITIN-D 12 HOUR) 5-120 MG per extended release tablet, Take 1 tablet by mouth 2 times daily, Disp: , Rfl:     nadolol (CORGARD) 40 MG tablet, 1/4-1/2 AS DIRECTED FOR TREMOR, Disp: , Rfl: 5    VENTOLIN  (90 Base) MCG/ACT inhaler, TWO SPRAYS BY MOUTH FOUR TIMES A DAY AS NEEDED, Disp: , Rfl: 5    baclofen (LIORESAL) 10 MG tablet, Take 10 mg by mouth 3 times daily, Disp: , Rfl:     cyclobenzaprine (FLEXERIL) 10 MG tablet, , Disp: , Rfl:     Family History   Problem Relation Age of Onset    High Blood Pressure Father     Mental Illness Sister     High Blood Pressure Paternal Grandmother     Cancer Paternal Grandmother     Heart Disease Paternal Grandfather     High Blood Pressure Paternal Grandfather     Stroke Paternal Grandfather Social History     Socioeconomic History    Marital status: Single     Spouse name: Not on file    Number of children: Not on file    Years of education: Not on file    Highest education level: Not on file   Occupational History    Occupation: disability   Social Needs    Financial resource strain: Not on file    Food insecurity     Worry: Not on file     Inability: Not on file   [employer] needs     Medical: Not on file     Non-medical: Not on file   Tobacco Use    Smoking status: Never Smoker    Smokeless tobacco: Never Used   Substance and Sexual Activity    Alcohol use: Yes     Comment: rare , hard cider or wine    Drug use: No    Sexual activity: Never   Lifestyle    Physical activity     Days per week: Not on file     Minutes per session: Not on file    Stress: Not on file   Relationships    Social connections     Talks on phone: Not on file     Gets together: Not on file     Attends Orthodoxy service: Not on file     Active member of club or organization: Not on file     Attends meetings of clubs or organizations: Not on file     Relationship status: Not on file    Intimate partner violence     Fear of current or ex partner: Not on file     Emotionally abused: Not on file     Physically abused: Not on file     Forced sexual activity: Not on file   Other Topics Concern    Not on file   Social History Narrative    Not on file         Review of Systems:  Review of Systems   Constitution: Negative. HENT: Negative. Eyes: Negative. Cardiovascular: Negative. Respiratory: Negative. Endocrine: Negative. Hematologic/Lymphatic: Negative. Skin: Negative. Musculoskeletal: Positive for back pain. Gastrointestinal: Negative. Genitourinary: Negative. Neurological: Positive for headaches. Psychiatric/Behavioral: Positive for depression.         Managing         Physical Exam:    Physical Exam  HENT:      Head:     Neurological:      Mental Status: He is alert and oriented to person, place, and time. Psychiatric:         Mood and Affect: Mood normal.         Thought Content: Thought content normal.           Assessment:    Problem List Items Addressed This Visit     Klinefelter syndrome    Relevant Medications    buprenorphine (BUTRANS) 5 MCG/HR PTWK (Start on 11/27/2020)    Intractable migraine without status migrainosus - Primary    Relevant Medications    tapentadol (NUCYNTA) 50 MG TABS    buprenorphine (BUTRANS) 5 MCG/HR PTWK (Start on 11/27/2020)    Generalized headaches    Relevant Medications    tapentadol (NUCYNTA) 50 MG TABS    buprenorphine (BUTRANS) 5 MCG/HR PTWK (Start on 11/27/2020)    Encounter for long-term opiate analgesic use    Relevant Medications    tapentadol (NUCYNTA) 50 MG TABS    Cervicalgia    Relevant Medications    tapentadol (NUCYNTA) 50 MG TABS    buprenorphine (BUTRANS) 5 MCG/HR PTWK (Start on 11/27/2020)    Cervical spondylosis without myelopathy    Relevant Medications    tapentadol (NUCYNTA) 50 MG TABS    buprenorphine (BUTRANS) 5 MCG/HR PTWK (Start on 11/27/2020)    Bilateral headaches    Relevant Medications    tapentadol (NUCYNTA) 50 MG TABS    buprenorphine (BUTRANS) 5 MCG/HR PTWK (Start on 11/27/2020)              Treatment Plan:  DISCUSSION: Treatment options discussed withpatient and all questions answered to patient's satisfaction. Possible side effects, risk of tolerance and or dependence and alternative treatments discussed    Obtaining appropriate analgesic effect of treatment   No signs of potential drug abuse or diversion identified    [x] Ill effects of being on chronic pain medications such as sleep disturbances, respiratory depression, hormonal changes, withdrawal symptoms, chronic opioid dependence and tolerance as well as risk of taking opioids with Benzodiazepines and taking opioids along with alcohol,  werediscussed with patient.  I had asked the patient to minimize medication use and utilize pain medications only for uncontrolled rest pain or pain with exertional activities. I advised patient not to self-escalate painmedications without consulting with us. At each of patient's future visits we will try to taper pain medications, while adjusting the adjunct medications, and re-evaluating for Physical Therapy to improve spinal andjoint strength. We will continue to have discussions to decrease pain medications as tolerated. Counseled patient on effects their pain medication and /or their medical condition mayhave on their  ability to drive or operate machinery. Instructed not to drive or operate machinery if drowsy     I also discussed with the patient regarding the dangers of combining narcotic pain medication with tranquilizers, alcohol or illegal drugs or taking the medication any way other than prescribed. The dangers were discussed  including respiratory depression and death. Patient was told to tell  all  physicians regarding the medications he is getting from pain clinic. Patient is warned not to take any unprescribed medications over-the-countermedications that can depress breathing . Patient is advised to talk to the pharmacist or physicians if planning to take any over-the-counter medications before  takeing them. Patient is strongly advised to avoid tranquilizers or  relaxants, illegal drugs  or any medications that can depress breathing  Patient is also advised to tell us if there is any changes in their medications from other physicians.     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

## 2020-12-17 ENCOUNTER — HOSPITAL ENCOUNTER (OUTPATIENT)
Dept: PAIN MANAGEMENT | Age: 35
Discharge: HOME | End: 2020-12-17
Payer: MEDICAID

## 2020-12-17 PROCEDURE — 99212 OFFICE O/P EST SF 10 MIN: CPT | Performed by: NURSE PRACTITIONER

## 2020-12-17 PROCEDURE — 99213 OFFICE O/P EST LOW 20 MIN: CPT

## 2020-12-17 RX ORDER — TAPENTADOL HYDROCHLORIDE 50 MG/1
50 TABLET, FILM COATED ORAL EVERY 8 HOURS
Qty: 90 TABLET | Refills: 0 | Status: SHIPPED | OUTPATIENT
Start: 2020-12-18 | End: 2021-01-19 | Stop reason: SDUPTHER

## 2020-12-17 ASSESSMENT — ENCOUNTER SYMPTOMS
GASTROINTESTINAL NEGATIVE: 1
PHOTOPHOBIA: 1
BACK PAIN: 1
SHORTNESS OF BREATH: 1

## 2020-12-17 NOTE — PROGRESS NOTES
[facility] [address] PROGRESS NOTE      Patient  completed []  video visit   [x]   phone call:   7   Minutes :   to  review Medication Agreement    Location:  Provider:  working from    []    home    [x]   [facility] - [facility] , patient at  home   Chief Complaint:  Headache      He c/o headache right frontal area, He has long standing history of migraines dating back to when he was a teenager, He has had various treatments and was seen at a [facility] in [facility] in the past.  No Ed visits. Migraine   This is a chronic problem. The current episode started more than 1 year ago. The problem occurs constantly. The problem has been unchanged. The pain is located in the right unilateral and frontal region. The pain does not radiate. The quality of the pain is described as aching and dull. The pain is at a severity of 7/10. The pain is moderate. Associated symptoms include back pain, phonophobia and photophobia. The symptoms are aggravated by bright light (sound). He has tried oral narcotics, darkened room and cold packs for the symptoms. The treatment provided mild relief.            Treatment goals:  Pain more manageable  Functional status:       Aberrancy:   Any alcoholic beverages   no         Any illegal drugs no    Analgesia:     7                Adverse  Effects : itching, tired      ADL;s :not as active    Data:    When was thelast UDS:    2-         Was the UDS appropriate:      Record/Diagnostics Review:      As above, I did review the imaging     2/21/2020 10:59 PM - Bull, Mhpn Incoming Lab Results From [facility]    Component Value Ref Range & Units Status Collected Lab   Pain Management Drug Panel Interp, Urine Inconsistent   Final 02/17/2020  1:15 PM [facility]   (NOTE)   ________________________________________________________________   DRUGS EXPECTED:   NUCYNTA (TAPENTADOL) [2/17/20]   BUTRANS (BUPRENORPHINE) [REMOVED THIS AM] ________________________________________________________________   CONSISTENT with medications provided:   NUCYNTA (TAPENTADOL) : based on tapentadol, tapentadol-o-sulfate   ________________________________________________________________   INCONSISTENT with medications provided:   BUTRANS (BUPRENORPHINE) : based on the absence of buprenorphine   and metabolites   ________________________________________________________________   INTERPRETIVE INFORMATION: Pain Mgt Abel, Mass Spec/EMIT, Ur,                            Interp   Interpretation depends on accuracy and completeness of patient   medication information submitted by client. Pill count: appropriate  fill date :    Morphine equivalent dose as reported on OARRS: 60.12   Fill date 12- for nucynta does not need refill on butrans patch  Periodic Controlled Substance Monitoring: Possible medication side effects, risk of tolerance/dependence & alternative treatments discussed., No signs of potential drug abuse or diversion identified. , Assessed functional status., Obtaining appropriate analgesic effect of treatment. Gómez Curry, APRN - CNP)  Review ofOARRS does not show any aberrant prescription behavior. Medication is helping the patient stay active. Patient denies any side effects and reports adequate analgesia. No sign of misuse/abuse. Past Medical History:   Diagnosis Date    Asthma     Depression     Headache(784.0)     Klinefelter syndrome        Past Surgical History:   Procedure Laterality Date    UPPP UVULOPALATOPHARYGOPLASTY         Allergies   Allergen Reactions    Food      Bananas, eggs, milk, cherries    Seasonal      Hay fever           Current Outpatient Medications:     tapentadol (NUCYNTA) 50 MG TABS, Take 1 tablet by mouth every 8 hours for 30 days. , Disp: 90 tablet, Rfl: 0    buprenorphine (BUTRANS) 5 MCG/HR PTWK, Place 1 patch onto the skin once a week for 28 days. , Disp: 4 patch, Rfl: 0   anastrozole (ARIMIDEX) 1 MG tablet, Take 1 mg by mouth daily takes 1/2 tab, Disp: , Rfl:     DULoxetine (CYMBALTA) 20 MG extended release capsule, TAKE 1 CAPSULE BY MOUTH ONCE A DAY, Disp: , Rfl: 2    loratadine-pseudoephedrine (CLARITIN-D 12 HOUR) 5-120 MG per extended release tablet, Take 1 tablet by mouth 2 times daily, Disp: , Rfl:     testosterone cypionate (DEPOTESTOTERONE CYPIONATE) 200 MG/ML injection, INJECT 1.25 MILLILITER INTRAMUSCULARLY EVERY 2 WEEKS, Disp: , Rfl:     nadolol (CORGARD) 40 MG tablet, 1/4-1/2 AS DIRECTED FOR TREMOR, Disp: , Rfl: 5    VENTOLIN  (90 Base) MCG/ACT inhaler, TWO SPRAYS BY MOUTH FOUR TIMES A DAY AS NEEDED, Disp: , Rfl: 5    baclofen (LIORESAL) 10 MG tablet, Take 10 mg by mouth 3 times daily, Disp: , Rfl:     cyclobenzaprine (FLEXERIL) 10 MG tablet, , Disp: , Rfl:     Family History   Problem Relation Age of Onset    High Blood Pressure Father     Mental Illness Sister     High Blood Pressure Paternal Grandmother     Cancer Paternal Grandmother     Heart Disease Paternal Grandfather     High Blood Pressure Paternal Grandfather     Stroke Paternal Grandfather        Social History     Socioeconomic History    Marital status: Single     Spouse name: Not on file    Number of children: Not on file    Years of education: Not on file    Highest education level: Not on file   Occupational History    Occupation: disability   Social Needs    Financial resource strain: Not on file    Food insecurity     Worry: Not on file     Inability: Not on file   [employer] needs     Medical: Not on file     Non-medical: Not on file   Tobacco Use    Smoking status: Never Smoker    Smokeless tobacco: Never Used   Substance and Sexual Activity    Alcohol use: Yes     Comment: rare , hard cider or wine    Drug use: No    Sexual activity: Never   Lifestyle    Physical activity     Days per week: Not on file     Minutes per session: Not on file  Stress: Not on file   Relationships    Social connections     Talks on phone: Not on file     Gets together: Not on file     Attends Quaker service: Not on file     Active member of club or organization: Not on file     Attends meetings of clubs or organizations: Not on file     Relationship status: Not on file    Intimate partner violence     Fear of current or ex partner: Not on file     Emotionally abused: Not on file     Physically abused: Not on file     Forced sexual activity: Not on file   Other Topics Concern    Not on file   Social History Narrative    Not on file         Review of Systems:  Review of Systems   Constitution: Negative. HENT: Negative. Eyes: Positive for photophobia. Cardiovascular: Negative. Respiratory: Positive for shortness of breath. Endocrine: Negative. Hematologic/Lymphatic: Negative. Skin: Negative. Musculoskeletal: Positive for back pain. Gastrointestinal: Negative. Genitourinary: Negative. Neurological: Positive for headaches. Psychiatric/Behavioral: Positive for depression. Managing         Physical Exam:    Physical Exam  HENT:      Head:     Neurological:      Mental Status: He is alert and oriented to person, place, and time. Psychiatric:         Mood and Affect: Mood normal.         Thought Content:  Thought content normal.           Assessment:      Problem List Items Addressed This Visit     Intractable migraine without status migrainosus - Primary    Relevant Medications    tapentadol (NUCYNTA) 50 MG TABS (Start on 12/18/2020)    Generalized headaches    Relevant Medications    tapentadol (NUCYNTA) 50 MG TABS (Start on 12/18/2020)    Encounter for long-term opiate analgesic use    Relevant Medications    tapentadol (NUCYNTA) 50 MG TABS (Start on 12/18/2020)    Cervicalgia    Relevant Medications    tapentadol (NUCYNTA) 50 MG TABS (Start on 12/18/2020)    Cervical spondylosis without myelopathy    Relevant Medications tapentadol (NUCYNTA) 50 MG TABS (Start on 12/18/2020)    Bilateral headaches    Relevant Medications    tapentadol (NUCYNTA) 50 MG TABS (Start on 12/18/2020)            Treatment Plan:  DISCUSSION: Treatment options discussed withpatient and all questions answered to patient's satisfaction. Possible side effects, risk of tolerance and or dependence and alternative treatments discussed    Obtaining appropriate analgesic effect of treatment   No signs of potential drug abuse or diversion identified    [x] Ill effects of being on chronic pain medications such as sleep disturbances, respiratory depression, hormonal changes, withdrawal symptoms, chronic opioid dependence and tolerance as well as risk of taking opioids with Benzodiazepines and taking opioids along with alcohol,  werediscussed with patient. I had asked the patient to minimize medication use and utilize pain medications only for uncontrolled rest pain or pain with exertional activities. I advised patient not to self-escalate painmedications without consulting with us. At each of patient's future visits we will try to taper pain medications, while adjusting the adjunct medications, and re-evaluating for Physical Therapy to improve spinal andjoint strength. We will continue to have discussions to decrease pain medications as tolerated. Counseled patient on effects their pain medication and /or their medical condition mayhave on their  ability to drive or operate machinery.  Instructed not to drive or operate machinery if drowsy

## 2021-01-19 ENCOUNTER — HOSPITAL ENCOUNTER (OUTPATIENT)
Dept: PAIN MANAGEMENT | Age: 36
Discharge: HOME | End: 2021-01-19
Payer: MEDICAID

## 2021-01-19 DIAGNOSIS — M54.2 CERVICALGIA: ICD-10-CM

## 2021-01-19 DIAGNOSIS — Z79.891 ENCOUNTER FOR LONG-TERM OPIATE ANALGESIC USE: ICD-10-CM

## 2021-01-19 DIAGNOSIS — G43.919 INTRACTABLE MIGRAINE WITHOUT STATUS MIGRAINOSUS, UNSPECIFIED MIGRAINE TYPE: ICD-10-CM

## 2021-01-19 DIAGNOSIS — Q98.4 KLINEFELTER SYNDROME: ICD-10-CM

## 2021-01-19 DIAGNOSIS — G43.519 INTRACTABLE PERSISTENT MIGRAINE AURA WITHOUT CEREBRAL INFARCTION AND WITHOUT STATUS MIGRAINOSUS: Primary | ICD-10-CM

## 2021-01-19 DIAGNOSIS — M47.812 CERVICAL SPONDYLOSIS WITHOUT MYELOPATHY: ICD-10-CM

## 2021-01-19 DIAGNOSIS — R51.9 BILATERAL HEADACHES: ICD-10-CM

## 2021-01-19 DIAGNOSIS — R51.9 GENERALIZED HEADACHES: ICD-10-CM

## 2021-01-19 PROCEDURE — 99441 PR PHYS/QHP TELEPHONE EVALUATION 5-10 MIN: CPT | Performed by: NURSE PRACTITIONER

## 2021-01-19 PROCEDURE — 99213 OFFICE O/P EST LOW 20 MIN: CPT

## 2021-01-19 RX ORDER — BUPRENORPHINE 5 UG/H
1 PATCH TRANSDERMAL WEEKLY
Qty: 4 PATCH | Refills: 0 | Status: SHIPPED | OUTPATIENT
Start: 2021-01-22 | End: 2021-02-19

## 2021-01-19 RX ORDER — SYRINGE WITH NEEDLE, 1 ML 25GX5/8"
SYRINGE, EMPTY DISPOSABLE MISCELLANEOUS
COMMUNITY
Start: 2020-12-28

## 2021-01-19 RX ORDER — TAPENTADOL HYDROCHLORIDE 50 MG/1
50 TABLET, FILM COATED ORAL EVERY 8 HOURS
Qty: 90 TABLET | Refills: 0 | Status: SHIPPED | OUTPATIENT
Start: 2021-01-19 | End: 2021-02-18 | Stop reason: SDUPTHER

## 2021-01-19 RX ORDER — DIVALPROEX SODIUM 250 MG/1
TABLET, EXTENDED RELEASE ORAL
COMMUNITY
Start: 2021-01-06

## 2021-01-19 ASSESSMENT — ENCOUNTER SYMPTOMS
GASTROINTESTINAL NEGATIVE: 1
SHORTNESS OF BREATH: 1
PHOTOPHOBIA: 1
BACK PAIN: 1

## 2021-02-18 ENCOUNTER — HOSPITAL ENCOUNTER (OUTPATIENT)
Dept: PAIN MANAGEMENT | Age: 36
Discharge: HOME | End: 2021-02-18
Payer: MEDICAID

## 2021-02-18 DIAGNOSIS — M54.2 CERVICALGIA: ICD-10-CM

## 2021-02-18 DIAGNOSIS — M47.812 CERVICAL SPONDYLOSIS WITHOUT MYELOPATHY: ICD-10-CM

## 2021-02-18 DIAGNOSIS — R51.9 BILATERAL HEADACHES: ICD-10-CM

## 2021-02-18 DIAGNOSIS — G43.919 INTRACTABLE MIGRAINE WITHOUT STATUS MIGRAINOSUS, UNSPECIFIED MIGRAINE TYPE: ICD-10-CM

## 2021-02-18 DIAGNOSIS — Z79.891 ENCOUNTER FOR LONG-TERM OPIATE ANALGESIC USE: ICD-10-CM

## 2021-02-18 DIAGNOSIS — R51.9 GENERALIZED HEADACHES: Primary | ICD-10-CM

## 2021-02-18 DIAGNOSIS — G43.519 INTRACTABLE PERSISTENT MIGRAINE AURA WITHOUT CEREBRAL INFARCTION AND WITHOUT STATUS MIGRAINOSUS: ICD-10-CM

## 2021-02-18 PROCEDURE — 99212 OFFICE O/P EST SF 10 MIN: CPT | Performed by: NURSE PRACTITIONER

## 2021-02-18 PROCEDURE — 99213 OFFICE O/P EST LOW 20 MIN: CPT

## 2021-02-18 RX ORDER — TAPENTADOL HYDROCHLORIDE 50 MG/1
50 TABLET, FILM COATED ORAL EVERY 8 HOURS
Qty: 90 TABLET | Refills: 0 | Status: SHIPPED | OUTPATIENT
Start: 2021-02-18 | End: 2021-03-16 | Stop reason: SDUPTHER

## 2021-02-18 ASSESSMENT — ENCOUNTER SYMPTOMS
PHOTOPHOBIA: 1
SHORTNESS OF BREATH: 1
VOMITING: 1

## 2021-02-18 NOTE — PROGRESS NOTES
[facility] PROGRESS NOTE      Patient  completed []  video visit   [x]   phone call:    7  Minutes :   [x]    to  review Medication Agreement    []  Follow up after procedure   []  Discuss treatment options    Location:  Provider:  working from    [x]    home    []   [facility] - [facility] , patient at home   Chief Complaint:  headache    He c/o  Headache  In the frontal area  of  His head, it does not radiate. He has headaches daily. Headaches have not changed. He has long standing history of migraines dating back to when he was a teenager. He was ,[facility] had been on morphine and methadone in 2017 and had been on topamax.  He tried aimovig for 2 months which did not improve his headaches. Prior treatments included  Acupressure which was in [facility] several years ( 15) years ago. Chiropractic treatment a few different Chiropractor visits, with neck adjustments x 15 years. His headaches are managed with nucynta and Butrans patch. He has had no Ed visits,    Migraine   This is a chronic problem. The problem occurs constantly. The problem has been unchanged. The pain is located in the frontal region. The pain does not radiate. The pain quality is similar to prior headaches. The quality of the pain is described as aching (stabbing). Associated symptoms include phonophobia, photophobia and vomiting. The symptoms are aggravated by noise (light). He has tried oral narcotics and darkened room for the symptoms.      Treatment goals:  Functional status: headache to be more manageable      Aberrancy:   Any alcoholic beverages  no          Any illegal drugs   no      Analgesia:      7               Adverse  Effects :makes him tired      ADL;soccasionally does some walking    Data:    When was thelast UDS:      2-       Was the UDS appropriate:      Record/Diagnostics Review:      As above, I did review the imaging 2/21/2020 10:59 PM - Kash Gottlieb Incoming Lab Results From [facility]    Component Value Ref Range & Units Status Collected Lab   Pain Management Drug Panel Interp, Urine Inconsistent   Final 02/17/2020  1:15 PM JOS   (NOTE)   ________________________________________________________________   DRUGS EXPECTED:   NUCYNTA (TAPENTADOL) [2/17/20]   BUTRANS (BUPRENORPHINE) [REMOVED THIS AM]   ________________________________________________________________   CONSISTENT with medications provided:   NUCYNTA (TAPENTADOL) : based on tapentadol, tapentadol-o-sulfate   ________________________________________________________________   INCONSISTENT with medications provided:   BUTRANS (BUPRENORPHINE) : based on the absence of buprenorphine   and metabolites   ________________________________________________________________   INTERPRETIVE INFORMATION: Pain Mgt Abel, Mass Spec/EMIT, Ur,                            Interp   Interpretation depends on accuracy and completeness of patient   medication information submitted by client. Pill count: appropriate  fill date : 2-    Morphine equivalent dose as reported on OARRS:60.12  Periodic Controlled Substance Monitoring: Possible medication side effects, risk of tolerance/dependence & alternative treatments discussed., No signs of potential drug abuse or diversion identified. , Assessed functional status., Obtaining appropriate analgesic effect of treatment. KASSANDRA Mittal - CNP)  Review ofOARRS does not show any aberrant prescription behavior. Medication is helping the patient stay active. Patient denies any side effects and reports adequate analgesia. No sign of misuse/abuse.             Past Medical History:   Diagnosis Date    Asthma     Depression     Headache(784.0)     Klinefelter syndrome        Past Surgical History:   Procedure Laterality Date    UPPP UVULOPALATOPHARYGOPLASTY         Allergies   Allergen Reactions    Food Bananas, eggs, milk, cherries    Clams   Upset stomach    Seasonal      Hay fever      Shellfish-Derived Products      Stomach issues         Current Outpatient Medications:     divalproex (DEPAKOTE ER) 250 MG extended release tablet, TAKE ONE TABLET BY MOUTH TWICE DAILY, Disp: , Rfl:     tapentadol (NUCYNTA) 50 MG TABS, Take 1 tablet by mouth every 8 hours for 30 days. , Disp: 90 tablet, Rfl: 0    buprenorphine (BUTRANS) 5 MCG/HR PTWK, Place 1 patch onto the skin once a week for 28 days. , Disp: 4 patch, Rfl: 0    anastrozole (ARIMIDEX) 1 MG tablet, Take 1 mg by mouth daily takes 1/2 tab, Disp: , Rfl:     DULoxetine (CYMBALTA) 20 MG extended release capsule, TAKE 1 CAPSULE BY MOUTH ONCE A DAY, Disp: , Rfl: 2    loratadine-pseudoephedrine (CLARITIN-D 12 HOUR) 5-120 MG per extended release tablet, Take 1 tablet by mouth 2 times daily, Disp: , Rfl:     B-D 3CC LUER-BRANDI SYR 43QG3-3/2 22G X 1-1/2\" 3 ML MISC, USE AS DIRECTED WITH TESTOSTERONE, Disp: , Rfl:     testosterone cypionate (DEPOTESTOTERONE CYPIONATE) 200 MG/ML injection, INJECT 1.25 MILLILITER INTRAMUSCULARLY EVERY 2 WEEKS, Disp: , Rfl:     nadolol (CORGARD) 40 MG tablet, 1/4-1/2 AS DIRECTED FOR TREMOR, Disp: , Rfl: 5    VENTOLIN  (90 Base) MCG/ACT inhaler, TWO SPRAYS BY MOUTH FOUR TIMES A DAY AS NEEDED, Disp: , Rfl: 5    baclofen (LIORESAL) 10 MG tablet, Take 10 mg by mouth 3 times daily, Disp: , Rfl:     cyclobenzaprine (FLEXERIL) 10 MG tablet, , Disp: , Rfl:     Family History   Problem Relation Age of Onset    High Blood Pressure Father     Mental Illness Sister     High Blood Pressure Paternal Grandmother     Cancer Paternal Grandmother     Heart Disease Paternal Grandfather     High Blood Pressure Paternal Grandfather     Stroke Paternal Grandfather        Social History     Socioeconomic History    Marital status: Single     Spouse name: Not on file    Number of children: Not on file    Years of education: Not on file  Highest education level: Not on file   Occupational History    Occupation: disability   Social Needs    Financial resource strain: Not on file    Food insecurity     Worry: Not on file     Inability: Not on file   [employer] needs     Medical: Not on file     Non-medical: Not on file   Tobacco Use    Smoking status: Never Smoker    Smokeless tobacco: Never Used   Substance and Sexual Activity    Alcohol use: Yes     Comment: rare , hard cider or wine    Drug use: No    Sexual activity: Never   Lifestyle    Physical activity     Days per week: Not on file     Minutes per session: Not on file    Stress: Not on file   Relationships    Social connections     Talks on phone: Not on file     Gets together: Not on file     Attends Latter-day service: Not on file     Active member of club or organization: Not on file     Attends meetings of clubs or organizations: Not on file     Relationship status: Not on file    Intimate partner violence     Fear of current or ex partner: Not on file     Emotionally abused: Not on file     Physically abused: Not on file     Forced sexual activity: Not on file   Other Topics Concern    Not on file   Social History Narrative    Not on file         Review of Systems:  Review of Systems   Constitution: Negative. HENT: Negative. Eyes: Positive for photophobia. Cardiovascular: Negative. Respiratory: Positive for shortness of breath. Endocrine: Negative. Hematologic/Lymphatic: Negative. Skin: Negative. Musculoskeletal: Negative. Gastrointestinal: Positive for vomiting. Genitourinary: Negative. Neurological: Positive for headaches and vertigo. Psychiatric/Behavioral: Positive for depression. Managing         Physical Exam:  There were no vitals taken for this visit. Physical Exam  HENT:      Head:     Neurological:      Mental Status: He is alert and oriented to person, place, and time.    Psychiatric: Mood and Affect: Mood normal.         Thought Content: Thought content normal.           Assessment:      Problem List Items Addressed This Visit     Intractable migraine without status migrainosus    Relevant Medications    tapentadol (NUCYNTA) 50 MG TABS    Generalized headaches - Primary    Relevant Medications    tapentadol (NUCYNTA) 50 MG TABS    Encounter for long-term opiate analgesic use    Relevant Medications    tapentadol (NUCYNTA) 50 MG TABS    Cervicalgia    Relevant Medications    tapentadol (NUCYNTA) 50 MG TABS    Cervical spondylosis without myelopathy    Relevant Medications    tapentadol (NUCYNTA) 50 MG TABS    Bilateral headaches    Relevant Medications    tapentadol (NUCYNTA) 50 MG TABS            Treatment Plan:  DISCUSSION: Treatment options discussed withpatient and all questions answered to patient's satisfaction. Possible side effects, risk of tolerance and or dependence and alternative treatments discussed    Obtaining appropriate analgesic effect of treatment   No signs of potential drug abuse or diversion identified    [x] Ill effects of being on chronic pain medications such as sleep disturbances, respiratory depression, hormonal changes, withdrawal symptoms, chronic opioid dependence and tolerance as well as risk of taking opioids with Benzodiazepines and taking opioids along with alcohol,  werediscussed with patient. I had asked the patient to minimize medication use and utilize pain medications only for uncontrolled rest pain or pain with exertional activities. I advised patient not to self-escalate painmedications without consulting with us. At each of patient's future visits we will try to taper pain medications, while adjusting the adjunct medications, and re-evaluating for Physical Therapy to improve spinal andjoint strength. We will continue to have discussions to decrease pain medications as tolerated. Counseled patient on effects their pain medication and /or their medical condition mayhave on their  ability to drive or operate machinery. Instructed not to drive or operate machinery if drowsy     I also discussed with the patient regarding the dangers of combining narcotic pain medication with tranquilizers, alcohol or illegal drugs or taking the medication any way other than prescribed. The dangers were discussed  including respiratory depression and death. Patient was told to tell  all  physicians regarding the medications he is getting from pain clinic. Patient is warned not to take any unprescribed medications over-the-countermedications that can depress breathing . Patient is advised to talk to the pharmacist or physicians if planning to take any over-the-counter medications before  takeing them. Patient is strongly advised to avoid tranquilizers or  relaxants, illegal drugs  or any medications that can depress breathing  Patient is also advised to tell us if there is any changes in their medications from other physicians.             TREATMENT OPTIONS:       Medication Agreement Requirements Met  Continue Opioid therapy  Script written for  nucynta  Follow up appointment made

## 2021-03-16 ENCOUNTER — HOSPITAL ENCOUNTER (OUTPATIENT)
Dept: PAIN MANAGEMENT | Age: 36
Discharge: HOME | End: 2021-03-16
Payer: MEDICAID

## 2021-03-16 DIAGNOSIS — Z79.891 ENCOUNTER FOR LONG-TERM OPIATE ANALGESIC USE: ICD-10-CM

## 2021-03-16 DIAGNOSIS — G43.519 INTRACTABLE PERSISTENT MIGRAINE AURA WITHOUT CEREBRAL INFARCTION AND WITHOUT STATUS MIGRAINOSUS: ICD-10-CM

## 2021-03-16 DIAGNOSIS — R51.9 GENERALIZED HEADACHES: Primary | ICD-10-CM

## 2021-03-16 DIAGNOSIS — M47.812 CERVICAL SPONDYLOSIS WITHOUT MYELOPATHY: ICD-10-CM

## 2021-03-16 DIAGNOSIS — R51.9 BILATERAL HEADACHES: ICD-10-CM

## 2021-03-16 DIAGNOSIS — Q98.4 KLINEFELTER SYNDROME: ICD-10-CM

## 2021-03-16 DIAGNOSIS — M54.2 CERVICALGIA: ICD-10-CM

## 2021-03-16 DIAGNOSIS — G43.919 INTRACTABLE MIGRAINE WITHOUT STATUS MIGRAINOSUS, UNSPECIFIED MIGRAINE TYPE: ICD-10-CM

## 2021-03-16 PROCEDURE — 99212 OFFICE O/P EST SF 10 MIN: CPT | Performed by: NURSE PRACTITIONER

## 2021-03-16 PROCEDURE — 99213 OFFICE O/P EST LOW 20 MIN: CPT

## 2021-03-16 RX ORDER — BUPRENORPHINE 5 UG/H
1 PATCH, EXTENDED RELEASE TRANSDERMAL WEEKLY
Qty: 4 PATCH | Refills: 0 | Status: SHIPPED | OUTPATIENT
Start: 2021-03-19 | End: 2021-04-20 | Stop reason: SDUPTHER

## 2021-03-16 RX ORDER — TAPENTADOL HYDROCHLORIDE 50 MG/1
50 TABLET, FILM COATED ORAL EVERY 8 HOURS
Qty: 90 TABLET | Refills: 0 | Status: SHIPPED | OUTPATIENT
Start: 2021-03-19 | End: 2021-04-20 | Stop reason: SDUPTHER

## 2021-03-16 ASSESSMENT — ENCOUNTER SYMPTOMS
SHORTNESS OF BREATH: 1
PHOTOPHOBIA: 1
GASTROINTESTINAL NEGATIVE: 1

## 2021-03-16 NOTE — PROGRESS NOTES
[facility] [address] PROGRESS NOTE      Patient  completed []  video visit   [x]   phone call:   9   Minutes :       [x]    to  review Medication Agreement    []  Follow up after procedure   []  Discuss treatment options      Location:  Provider:  working from    [x]    home    []   [facility] - [facility] ,   patient at home       Chief Complaint: headache    He c/o pain left side of head which  does not radiate  Pain is similar to previous headaches. He reports that he has headaches every day, He is on nucynta and butrans patch which helps to manage his headaches. .  He has long standing history of migraines since he was a teenager. He was previously  seen at [facility] in [place] had been on morphine and methadone in 2017 and had been on topamax.  He tried aimovig for 2 months which did not improve his headaches. Prior treatments included  Acupressure which was in [place] several years ( 15) years ago. Chiropractic treatment a few different Chiropractor visits, with neck adjustments x 15 years. He states he started doing yoga at home. No Ed visits. Migraine   This is a chronic problem. The problem occurs constantly. The problem has been unchanged. The pain is located in the left unilateral region. The pain quality is similar to prior headaches. The quality of the pain is described as aching. The pain is at a severity of 6/10. The pain is moderate. Associated symptoms include phonophobia and photophobia. The symptoms are aggravated by noise. He has tried darkened room, cold packs and oral narcotics for the symptoms. Treatment goals:  Functional status: for pain to be manageable    Aberrancy:   Any alcoholic beverages no           Any illegal drugs  no       Analgesia:                      Adverse  Effects :some itching and drowsiness      ADL;s :doing yoga      Data:    When was thelast UDS:   2-         Was the UDS appropriate:yes Record/Diagnostics Review:      As above, I did review the imaging     2/21/2020 10:59 PM - Kash Gotltieb Incoming Lab Results From [facility]    Component Value Ref Range & Units Status Collected Lab   Pain Management Drug Panel Interp, Urine Inconsistent   Final 02/17/2020  1:15 PM JOS   (NOTE)   ________________________________________________________________   DRUGS EXPECTED:   NUCYNTA (TAPENTADOL) [2/17/20]   BUTRANS (BUPRENORPHINE) [REMOVED THIS AM]   ________________________________________________________________   CONSISTENT with medications provided:   NUCYNTA (TAPENTADOL) : based on tapentadol, tapentadol-o-sulfate   ________________________________________________________________   INCONSISTENT with medications provided:   BUTRANS (BUPRENORPHINE) : based on the absence of buprenorphine   and metabolites   ________________________________________________________________   INTERPRETIVE INFORMATION: Pain Mgt Abel, Mass Spec/EMIT, Ur,                            Interp   Interpretation depends on accuracy and completeness of patient   medication information submitted by client. Pill count: appropriate  fill date :3-    Morphine equivalent dose as reported on OARRS:60.12  Periodic Controlled Substance Monitoring: Possible medication side effects, risk of tolerance/dependence & alternative treatments discussed., No signs of potential drug abuse or diversion identified. , Assessed functional status., Obtaining appropriate analgesic effect of treatment. Blayne Tolliver, APRN - CNP)  Review ofOARRS does not show any aberrant prescription behavior. Medication is helping the patient stay active. Patient denies any side effects and reports adequate analgesia. No sign of misuse/abuse.             Past Medical History:   Diagnosis Date    Asthma     Depression     Headache(784.0)     Klinefelter syndrome        Past Surgical History:   Procedure Laterality Date    UPPP UVULOPALATOPHARYGOPLASTY Allergies   Allergen Reactions    Food      Bananas, eggs, milk, cherries    Clams   Upset stomach    Seasonal      Hay fever      Shellfish-Derived Products      Stomach issues         Current Outpatient Medications:     tapentadol (NUCYNTA) 50 MG TABS, Take 1 tablet by mouth every 8 hours for 30 days. , Disp: 90 tablet, Rfl: 0    divalproex (DEPAKOTE ER) 250 MG extended release tablet, TAKE ONE TABLET BY MOUTH TWICE DAILY, Disp: , Rfl:     anastrozole (ARIMIDEX) 1 MG tablet, Take 1 mg by mouth daily takes 1/2 tab, Disp: , Rfl:     DULoxetine (CYMBALTA) 20 MG extended release capsule, TAKE 1 CAPSULE BY MOUTH ONCE A DAY, Disp: , Rfl: 2    loratadine-pseudoephedrine (CLARITIN-D 12 HOUR) 5-120 MG per extended release tablet, Take 1 tablet by mouth 2 times daily, Disp: , Rfl:     B-D 3CC LUER-BRANDI SYR 36EQ4-0/2 22G X 1-1/2\" 3 ML MISC, USE AS DIRECTED WITH TESTOSTERONE, Disp: , Rfl:     testosterone cypionate (DEPOTESTOTERONE CYPIONATE) 200 MG/ML injection, INJECT 1.25 MILLILITER INTRAMUSCULARLY EVERY 2 WEEKS, Disp: , Rfl:     nadolol (CORGARD) 40 MG tablet, 1/4-1/2 AS DIRECTED FOR TREMOR, Disp: , Rfl: 5    VENTOLIN  (90 Base) MCG/ACT inhaler, TWO SPRAYS BY MOUTH FOUR TIMES A DAY AS NEEDED, Disp: , Rfl: 5    baclofen (LIORESAL) 10 MG tablet, Take 10 mg by mouth 3 times daily, Disp: , Rfl:     cyclobenzaprine (FLEXERIL) 10 MG tablet, , Disp: , Rfl:     Family History   Problem Relation Age of Onset    High Blood Pressure Father     Mental Illness Sister     High Blood Pressure Paternal Grandmother     Cancer Paternal Grandmother     Heart Disease Paternal Grandfather     High Blood Pressure Paternal Grandfather     Stroke Paternal Grandfather        Social History     Socioeconomic History    Marital status: Single     Spouse name: Not on file    Number of children: Not on file    Years of education: Not on file    Highest education level: Not on file   Occupational History    Occupation: disability   Social Needs    Financial resource strain: Not on file    Food insecurity     Worry: Not on file     Inability: Not on file   [employer] needs     Medical: Not on file     Non-medical: Not on file   Tobacco Use    Smoking status: Never Smoker    Smokeless tobacco: Never Used   Substance and Sexual Activity    Alcohol use: Yes     Comment: rare , hard cider or wine    Drug use: No    Sexual activity: Never   Lifestyle    Physical activity     Days per week: Not on file     Minutes per session: Not on file    Stress: Not on file   Relationships    Social connections     Talks on phone: Not on file     Gets together: Not on file     Attends [employer] service: Not on file     Active member of club or organization: Not on file     Attends meetings of clubs or organizations: Not on file     Relationship status: Not on file    Intimate partner violence     Fear of current or ex partner: Not on file     Emotionally abused: Not on file     Physically abused: Not on file     Forced sexual activity: Not on file   Other Topics Concern    Not on file   Social History Narrative    Not on file         Review of Systems:  Review of Systems   Constitution: Negative. HENT: Negative. Eyes: Positive for photophobia. Cardiovascular: Negative. Respiratory: Positive for shortness of breath. Hematologic/Lymphatic: Negative. Skin: Negative. Musculoskeletal: Negative. Gastrointestinal: Negative. Genitourinary: Negative. Neurological: Positive for headaches. Psychiatric/Behavioral: Positive for depression. Managing         Physical Exam:  There were no vitals taken for this visit. Physical Exam  HENT:      Head:     Neurological:      Mental Status: He is alert and oriented to person, place, and time. Psychiatric:         Mood and Affect: Mood normal.         Thought Content:  Thought content normal.           Assessment:      Problem List Items Addressed This Visit     Klinefelter syndrome    Relevant Medications    BUTRANS 5 MCG/HR PTWK (Start on 3/19/2021)    Intractable migraine without status migrainosus    Relevant Medications    tapentadol (NUCYNTA) 50 MG TABS (Start on 3/19/2021)    Harlene Craw 5 MCG/HR PTWK (Start on 3/19/2021)    Generalized headaches - Primary    Relevant Medications    tapentadol (NUCYNTA) 50 MG TABS (Start on 3/19/2021)    Harlene Craw 5 MCG/HR PTWK (Start on 3/19/2021)    Encounter for long-term opiate analgesic use    Relevant Medications    tapentadol (NUCYNTA) 50 MG TABS (Start on 3/19/2021)    Cervicalgia    Relevant Medications    tapentadol (NUCYNTA) 50 MG TABS (Start on 3/19/2021)    Harlene Craw 5 MCG/HR PTWK (Start on 3/19/2021)    Cervical spondylosis without myelopathy    Relevant Medications    tapentadol (NUCYNTA) 50 MG TABS (Start on 3/19/2021)    Harlene Craw 5 MCG/HR PTWK (Start on 3/19/2021)    Bilateral headaches    Relevant Medications    tapentadol (NUCYNTA) 50 MG TABS (Start on 3/19/2021)    Harlene Craw 5 MCG/HR PTWK (Start on 3/19/2021)          Treatment Plan:  DISCUSSION: Treatment options discussed withpatient and all questions answered to patient's satisfaction. Possible side effects, risk of tolerance and or dependence and alternative treatments discussed    Obtaining appropriate analgesic effect of treatment   No signs of potential drug abuse or diversion identified    [x] Ill effects of being on chronic pain medications such as sleep disturbances, respiratory depression, hormonal changes, withdrawal symptoms, chronic opioid dependence and tolerance as well as risk of taking opioids with Benzodiazepines and taking opioids along with alcohol,  werediscussed with patient. I had asked the patient to minimize medication use and utilize pain medications only for uncontrolled rest pain or pain with exertional activities. I advised patient not to self-escalate painmedications without consulting with us.   At each of patient's future visits we will try to taper pain medications, while adjusting the adjunct medications, and re-evaluating for Physical Therapy to improve spinal andjoint strength. We will continue to have discussions to decrease pain medications as tolerated. Counseled patient on effects their pain medication and /or their medical condition mayhave on their  ability to drive or operate machinery. Instructed not to drive or operate machinery if drowsy     I also discussed with the patient regarding the dangers of combining narcotic pain medication with tranquilizers, alcohol or illegal drugs or taking the medication any way other than prescribed. The dangers were discussed  including respiratory depression and death. Patient was told to tell  all  physicians regarding the medications he is getting from pain clinic. Patient is warned not to take any unprescribed medications over-the-countermedications that can depress breathing . Patient is advised to talk to the pharmacist or physicians if planning to take any over-the-counter medications before  takeing them. Patient is strongly advised to avoid tranquilizers or  relaxants, illegal drugs  or any medications that can depress breathing  Patient is also advised to tell us if there is any changes in their medications from other physicians.     Jay Toney done 3-3-2021 per phone for Butrans patch, patient states got a denial, Doe morin needs notes, will have them faxed today        TREATMENT OPTIONS:       Medication Agreement Requirements Met  Continue Opioid therapy  Script written for  nucynta and butrans patch  Follow up appointment made

## 2021-04-16 ASSESSMENT — ENCOUNTER SYMPTOMS
BLURRED VISION: 0
EYE REDNESS: 0
GASTROINTESTINAL NEGATIVE: 1
RESPIRATORY NEGATIVE: 1
COUGH: 0
CHOKING: 0
VOMITING: 0
PHOTOPHOBIA: 1
BACK PAIN: 0
ALLERGIC/IMMUNOLOGIC NEGATIVE: 1
EYE PAIN: 0
EYE WATERING: 0
ABDOMINAL PAIN: 0
SHORTNESS OF BREATH: 0
DIARRHEA: 0

## 2021-04-16 NOTE — PROGRESS NOTES
Bernie Veras is a 37 y.o. male evaluated on 4/20/2021. Modality of virtual service provided -via  telephone   Consent:  Patient and/or health care decision maker is aware that that patient may receive a bill for this telephone service, depending on one's insurance coverage, and has provided verbal consent to proceed: Yes    Patient identification was verified at the start of the visit: Yes    Chief complaint: Bernie Veras is 37 y.o.,  male, with  with chief complaint of pain involving head (headaches). .    Patient is complaining of pain involving the head involving entire scalp. Patient pain is constant. He has been diagnosed with migraine headaches and tried several medications and physicians without much improvement. Finally his headaches are stabilized with Butrans and Nucynta. Patient reports his average pain is around 4 on a scale of 0-10 there is not much change in pain since his last visit. He reports medications are helping the pain. Migraine   This is a chronic problem. The current episode started more than 1 year ago. The problem occurs constantly. The problem has been gradually worsening (Somewhat worse since he is off medications). The pain is located in the bilateral, occipital, frontal, parietal, temporal and retro-orbital region. The pain quality is similar to prior headaches. The quality of the pain is described as aching, throbbing, shooting, boring and squeezing. The pain is at a severity of 4/10 (3-8). The pain is moderate (Moderate to severe). Associated symptoms include phonophobia and photophobia. Pertinent negatives include no abdominal pain, back pain, blurred vision, coughing, eye pain, eye redness, eye watering, fever, hearing loss, neck pain, numbness, sore throat, tingling, tinnitus, vomiting or weakness. The symptoms are aggravated by bright light and noise.  He has tried acetaminophen, darkened room, oral narcotics, antidepressants, beta blockers, triptans and NSAIDs (CGRP antagonists, anticonvulsants) for the symptoms. The treatment provided no relief. Alleviating factors:ice and dark room  Lifestyle changes experienced with pain: Prevents or limits ADLs, Increases w/activity. Mood changes,none  Patient currently unemployed. Physical therapy did not help the pain. Are you under psychological counseling at present: No  Goals for treatment include:  Decrease in pain  Enjoy daily and recreational activities, return to previous status. Patient relates current medications are helping the pain. Patient reports taking pain medications as prescribed, denies obtaining medications from different sources and denies use of illegal drugs. Patient denies side effects from medications like nausea, vomiting, constipation or drowsiness. Patient reports current activities of daily living ar possible due to medications and would like to continue them. ACTIVITY/SOCIAL/EMOTIONAL:  Sleep Pattern: 7 hours per night. nightime awakenings  Home Exercises: Yoga   Activity:not significantly changed  Emotional Issues: Depression.    Currently seeing a Psychiatrist or Psychologist:  No     ADVERSE MEDICATION EFFECTS:   Nausea and vomiting: no   Constipation: no-Undercontrol-: yes  Dizziness/drowsy/sleepy--no  Urinary Retention: no  [place] makes him itchy  ABERRANT BEHAVIORS SINCE LAST VISIT  Lost rx/pills:------------------------------------------ no  Taking  medication as prescribed: ----------- yes  Urine Drug Screen ---------------------------------  yes             Date------------------------------------------------- 2/21/2020              Results as expected ---------------------yes    Recent ER visits: -------------------------------------No  Pill count is appropriate: ---------------------------yes   Refills for prescriptions appropriate:---------- yes      Past Medical History:   Diagnosis Date    Asthma     Depression     Headache(894.0)     Klinefelter numbness. Hematological: Negative. Psychiatric/Behavioral: Negative for confusion, self-injury, sleep disturbance and suicidal ideas. The patient is not nervous/anxious. Depression        Physical Exam  HENT:      Head:     Neurological:      Mental Status: He is alert and oriented to person, place, and time. Psychiatric:         Mood and Affect: Mood normal.            DATA:  LAB.:  2/21/2020 10:59 PM - [facility] Incoming Lab Results From [facility]    Component Value Ref Range & Units Status Collected Lab   Pain Management Drug Panel Interp, Urine Inconsistent   Final 02/17/2020  1:15 PM [facility]   (NOTE)   ________________________________________________________________   DRUGS EXPECTED:   NUCYNTA (TAPENTADOL) [2/17/20]   BUTRANS (BUPRENORPHINE) [REMOVED THIS AM]   ________________________________________________________________   CONSISTENT with medications provided:   Jose Santos (TAPENTADOL) : based on tapentadol, tapentadol-o-sulfate   ________________________________________________________________   INCONSISTENT with medications provided:   BUTRANS (BUPRENORPHINE) : based on the absence of buprenorphine   and metabolites   ________________________________________________________________       X-Ray reports:  Cervical Spine:  9/21/2016. Reason for study: Chronic migraines and neck pain  Comparison: None. Technique: 8 images of the cervical spine were obtained. Findings: There is normal alignment without significant alignment shift upon flexion. There is no acute fracture or subluxation. The dens is intact. Oblique views demonstrate the neural foramen to be widely patent bilaterally  The vertebral bodies demonstrate normal height. The intervertebral disc spaces are well maintained. There is no prevertebral soft tissue swelling. Impression: Unremarkable cervical spine radiographs. Final report electronically signed by Juana Mcdonough M.D. on 9/21/2016     Clinical  impression:  1.  Generalized hormonal changes, withdrawal symptoms,  chronic opioid dependence and tolerance were discussed with patient. I had asked the patient to minimize medication use and utilize pain medications only for uncontrolled rest pain or pain with exertional activities. I advised patient not to self escalate pain medications without consulting with us. At each of patient's future visits we will try to taper pain medications, while adjusting the adjunct medications, and re-evaluating for Physical Therapy to improve spinal and joint strength. We will continue to have discussions to decrease pain medications as tolerated. I also discussed with the patient regarding the dangers of combining narcotic pain medication with tranquilizers, alcohol or illegal drugs or taking the medication any other than prescribed. The dangers including the respiratory depression and death. Patient was told to tell  to all  physicians regarding the medications he is getting from pain clinic. Patient is warned not to take any unprescribed medications over-the-counter medications that can depress breathing . Patient is advised to talk to the pharmacist or physicians if planning to take any over-the-counter medications before  takeing them. Patient is strongly advised to avoid tranquilizers or  Relaxants for any medications that can depress breathing or recreational drugs. Patient is also advised to tell us if there is any changes in his medications from other physicians. We discussed the same at today's visit and have not been to implement it, as the patient's pain is not under control with current medications. Decision Making Process : Patient's health history and referral records thoroughly reviewed before focused physical examination and discussion with patient. I have spent 25 mins. Over 50% of today's visit is spent on examining the patient and counseling and coordinating the care. Level of complexity of date to be reviewed is Moderate. The chart date reviewed include the following: Imaging Reports. Summary of Care. Time spent reviewing with patient the below reports:   Medication safety, Treatment options. Level of diagnosis and management options of this case is multiple: involving the following management options: Interventions as needed, medication management as appropriate, future visits, activity modification, heat/ice as needed, Urine drug screen as required. [x]The patient's questions were answered to the best of my abilities. This note was created using voice recognition software. There may be inaccuracies of transcription  that are inadvertently overlooked prior to the signature. There is any questions about the transcription please contact me. Return in  4 weeks  with physician / CNP  for further plan of treatment. Due to the COVID-19 pandemic and the appropriate interventions by Savannah Williamson, our non-urgent pain management patients will not be seen in the office at this time for their protection and the protection of our staff. To offer continuity of care, their prescriptions will be escribed this month after a careful chart review and review of their OARRS report  Pursuant to the emergency declaration under the [employer] and [facility], 1135 waiver authority and the [employer] and [employer] Act, this Virtual Visit was conducted, with patient's consent, to reduce the patient's risk of exposure to COVID-19 and provide continuity of care for an established patient. Services were provided through a video synchronous discussion virtually to substitute for in-person appointment. \"  Documentation:  I communicated with the patient and/or health care decision maker about plan of care  Details of this discussion including any medical advice provided:   Total Time: minutes: 21-30 minutes    I affirm this is a Patient Initiated Episode with an Established Patient who has not had a related appointment within my department in the past 7 days or scheduled within the next 24 hours.     Electronically signed by Emiliano Hsieh MD on 4/20/2021 at 12:12 PM

## 2021-04-20 ENCOUNTER — HOSPITAL ENCOUNTER (OUTPATIENT)
Dept: PAIN MANAGEMENT | Age: 36
Discharge: HOME | End: 2021-04-20
Payer: MEDICAID

## 2021-04-20 DIAGNOSIS — G43.519 INTRACTABLE PERSISTENT MIGRAINE AURA WITHOUT CEREBRAL INFARCTION AND WITHOUT STATUS MIGRAINOSUS: ICD-10-CM

## 2021-04-20 DIAGNOSIS — M54.2 CERVICALGIA: ICD-10-CM

## 2021-04-20 DIAGNOSIS — Z79.891 ENCOUNTER FOR LONG-TERM OPIATE ANALGESIC USE: ICD-10-CM

## 2021-04-20 DIAGNOSIS — G43.919 INTRACTABLE MIGRAINE WITHOUT STATUS MIGRAINOSUS, UNSPECIFIED MIGRAINE TYPE: ICD-10-CM

## 2021-04-20 DIAGNOSIS — R51.9 BILATERAL HEADACHES: ICD-10-CM

## 2021-04-20 DIAGNOSIS — R51.9 GENERALIZED HEADACHES: Primary | ICD-10-CM

## 2021-04-20 DIAGNOSIS — Q98.4 KLINEFELTER SYNDROME: ICD-10-CM

## 2021-04-20 DIAGNOSIS — Z79.899 ENCOUNTER FOR MEDICATION MANAGEMENT: ICD-10-CM

## 2021-04-20 DIAGNOSIS — M47.812 CERVICAL SPONDYLOSIS WITHOUT MYELOPATHY: ICD-10-CM

## 2021-04-20 PROCEDURE — 99213 OFFICE O/P EST LOW 20 MIN: CPT

## 2021-04-20 PROCEDURE — 99214 OFFICE O/P EST MOD 30 MIN: CPT | Performed by: PAIN MEDICINE

## 2021-04-20 RX ORDER — BUPRENORPHINE 5 UG/H
1 PATCH, EXTENDED RELEASE TRANSDERMAL WEEKLY
Qty: 4 PATCH | Refills: 0 | Status: SHIPPED | OUTPATIENT
Start: 2021-04-20 | End: 2021-07-15 | Stop reason: SDUPTHER

## 2021-04-20 RX ORDER — TAPENTADOL HYDROCHLORIDE 50 MG/1
50 TABLET, FILM COATED ORAL EVERY 8 HOURS
Qty: 90 TABLET | Refills: 0 | Status: SHIPPED | OUTPATIENT
Start: 2021-04-20 | End: 2021-05-18 | Stop reason: SDUPTHER

## 2021-04-20 ASSESSMENT — ENCOUNTER SYMPTOMS: SORE THROAT: 0

## 2021-05-18 ENCOUNTER — HOSPITAL ENCOUNTER (OUTPATIENT)
Dept: PAIN MANAGEMENT | Age: 36
Discharge: HOME | End: 2021-05-18
Payer: MEDICAID

## 2021-05-18 DIAGNOSIS — Z79.891 ENCOUNTER FOR LONG-TERM OPIATE ANALGESIC USE: ICD-10-CM

## 2021-05-18 DIAGNOSIS — R51.9 GENERALIZED HEADACHES: ICD-10-CM

## 2021-05-18 DIAGNOSIS — R51.9 BILATERAL HEADACHES: ICD-10-CM

## 2021-05-18 DIAGNOSIS — M47.812 CERVICAL SPONDYLOSIS WITHOUT MYELOPATHY: ICD-10-CM

## 2021-05-18 DIAGNOSIS — G43.919 INTRACTABLE MIGRAINE WITHOUT STATUS MIGRAINOSUS, UNSPECIFIED MIGRAINE TYPE: ICD-10-CM

## 2021-05-18 DIAGNOSIS — G43.519 INTRACTABLE PERSISTENT MIGRAINE AURA WITHOUT CEREBRAL INFARCTION AND WITHOUT STATUS MIGRAINOSUS: Primary | ICD-10-CM

## 2021-05-18 DIAGNOSIS — M54.2 CERVICALGIA: ICD-10-CM

## 2021-05-18 PROCEDURE — 99212 OFFICE O/P EST SF 10 MIN: CPT | Performed by: NURSE PRACTITIONER

## 2021-05-18 PROCEDURE — 99213 OFFICE O/P EST LOW 20 MIN: CPT

## 2021-05-18 RX ORDER — TAPENTADOL HYDROCHLORIDE 50 MG/1
50 TABLET, FILM COATED ORAL EVERY 8 HOURS
Qty: 90 TABLET | Refills: 0 | Status: SHIPPED | OUTPATIENT
Start: 2021-05-20 | End: 2021-06-14 | Stop reason: SDUPTHER

## 2021-05-18 ASSESSMENT — ENCOUNTER SYMPTOMS
GASTROINTESTINAL NEGATIVE: 1
BACK PAIN: 1
SHORTNESS OF BREATH: 1
PHOTOPHOBIA: 1

## 2021-05-18 NOTE — PROGRESS NOTES
[facility] PROGRESS NOTE      Patient  completed []  video visit   [x]   phone call:   7      Minutes :       []    to  review Medication Agreement    []  Follow up after procedure   []  Discuss treatment options      Location:  Provider:  working from    [x]    home    []   [facility] - LOUR[facility] SIMMONS ,   patient at home       Chief Complaint:  headache    He c/o pain left temporal area which has not changed. He has long standing  history of migraines since he was a teenager. He was previously  seen at [facility] in [place] had been on morphine and methadone in 2017 and had been on topamax.  He tried aimovig for 2 months which did not improve his headaches. .Prior treatments included  Acupressure which was in [facility] several years ( 15) years ago. Chiropractic treatment a few different Chiropractor visits, with neck adjustments x 15 years. He states sometimes he has an aura before his migraine, which consists of vision changes. Daphne Mceloryed He is doing yoga. He currently is on butrans patch and nucynta. He sleeps well. Migraine   This is a chronic problem. The problem occurs constantly. The problem has been unchanged. The pain is located in the temporal (left temporal) region. The pain does not radiate. The pain quality is similar to prior headaches. The quality of the pain is described as aching and throbbing. The pain is at a severity of 6/10. Associated symptoms include back pain, phonophobia and photophobia. Associated symptoms comments: Occasional aura, vision changes. The symptoms are aggravated by noise and bright light. He has tried darkened room and cold packs for the symptoms.              Treatment goals:  Functional status: for pain to be manageable      Aberrancy:   Any alcoholic beverages   no         Any illegal drugs   no      Analgesia:         6            Adverse  Effects :itching with nucynta    ADL;s :      Data:    When was thelast UDS: 2-         Was the UDS appropriate:  [x] yes []   no      Record/Diagnostics Review:      As above, I did review the imaging     Data:     When was thelast UDS:   2-         Was the UDS appropriate:yes        Record/Diagnostics Review:       As above, I did review the imaging     2/21/2020 10:59 PM - [facility] Incoming Lab Results From [facility]     Component Value Ref Range & Units Status Collected Lab   Pain Management Drug Panel Interp, Urine Inconsistent    Final 02/17/2020  1:15 PM [facility]   (NOTE)   ________________________________________________________________   DRUGS EXPECTED:   NUCYNTA (TAPENTADOL) [2/17/20]   BUTRANS (BUPRENORPHINE) [REMOVED THIS AM]   ________________________________________________________________   CONSISTENT with medications provided:   NUCYNTA (TAPENTADOL) : based on tapentadol, tapentadol-o-sulfate   ________________________________________________________________   INCONSISTENT with medications provided:   BUTRANS (BUPRENORPHINE) : based on the absence of buprenorphine   and metabolites   ________________________________________________________________   INTERPRETIVE INFORMATION: Pain Mgt Abel, [place] Spec/EMIT, Ur,                            Interp   Interpretation depends on accuracy and completeness of patient   medication information submitted by client.                       Pill count: appropriate    fill date :5-    Morphine equivalent dose as reported on OARRS: 60.12  Periodic Controlled Substance Monitoring: Possible medication side effects, risk of tolerance/dependence & alternative treatments discussed., No signs of potential drug abuse or diversion identified. , Assessed functional status., Obtaining appropriate analgesic effect of treatment. Joelle Mart, APRN - CNP)  Review ofOARRS does not show any aberrant prescription behavior. Medication is helping the patient stay active. Patient denies any side effects and reports adequate analgesia.  No sign of misuse/abuse. Past Medical History:   Diagnosis Date    Asthma     Depression     Headache(784.0)     Klinefelter syndrome        Past Surgical History:   Procedure Laterality Date    UPPP UVULOPALATOPHARYGOPLASTY         Allergies   Allergen Reactions    Food      Bananas, eggs, milk, cherries    Clams   Upset stomach    Seasonal      Hay fever      Shellfish-Derived Products      Stomach issues         Current Outpatient Medications:     tapentadol (NUCYNTA) 50 MG TABS, Take 1 tablet by mouth every 8 hours for 30 days. , Disp: 90 tablet, Rfl: 0    BUTRANS 5 MCG/HR PTWK, Place 1 patch onto the skin once a week for 28 days. , Disp: 4 patch, Rfl: 0    divalproex (DEPAKOTE ER) 250 MG extended release tablet, TAKE ONE TABLET BY MOUTH TWICE DAILY, Disp: , Rfl:     testosterone cypionate (DEPOTESTOTERONE CYPIONATE) 200 MG/ML injection, INJECT 1.25 MILLILITER INTRAMUSCULARLY EVERY 2 WEEKS, Disp: , Rfl:     DULoxetine (CYMBALTA) 20 MG extended release capsule, TAKE 1 CAPSULE BY MOUTH ONCE A DAY, Disp: , Rfl: 2    loratadine-pseudoephedrine (CLARITIN-D 12 HOUR) 5-120 MG per extended release tablet, Take 1 tablet by mouth 2 times daily, Disp: , Rfl:     B-D 3CC LUER-BRANDI SYR 05DT4-3/2 22G X 1-1/2\" 3 ML MISC, USE AS DIRECTED WITH TESTOSTERONE, Disp: , Rfl:     anastrozole (ARIMIDEX) 1 MG tablet, Take 1 mg by mouth daily takes 1/2 tab, Disp: , Rfl:     nadolol (CORGARD) 40 MG tablet, 1/4-1/2 AS DIRECTED FOR TREMOR, Disp: , Rfl: 5    VENTOLIN  (90 Base) MCG/ACT inhaler, TWO SPRAYS BY MOUTH FOUR TIMES A DAY AS NEEDED, Disp: , Rfl: 5    baclofen (LIORESAL) 10 MG tablet, Take 10 mg by mouth 3 times daily, Disp: , Rfl:     cyclobenzaprine (FLEXERIL) 10 MG tablet, , Disp: , Rfl:     Family History   Problem Relation Age of Onset    High Blood Pressure Father     Mental Illness Sister     High Blood Pressure Paternal Grandmother     Cancer Paternal Grandmother     Heart Disease Paternal Grandfather     High Blood Pressure Paternal Grandfather     Stroke Paternal Grandfather        Social History     Socioeconomic History    Marital status: Single     Spouse name: Not on file    Number of children: Not on file    Years of education: Not on file    Highest education level: Not on file   Occupational History    Occupation: disability   Tobacco Use    Smoking status: Never Smoker    Smokeless tobacco: Never Used   Vaping Use    Vaping Use: Never used   Substance and Sexual Activity    Alcohol use: Yes     Comment: rare , hard cider or wine    Drug use: No    Sexual activity: Never   Other Topics Concern    Not on file   Social History Narrative    Not on file     Social Determinants of Health     Financial Resource Strain:     Difficulty of Paying Living Expenses:    Food Insecurity:     Worried About Running Out of Food in the Last Year:     [address] in the Last Year:    Transportation Needs:     Lack of Transportation (Medical):  Lack of Transportation (Non-Medical):    Physical Activity:     Days of Exercise per Week:     Minutes of Exercise per Session:    Stress:     Feeling of Stress :    Social Connections:     Frequency of Communication with Friends and Family:     Frequency of Social Gatherings with Friends and Family:     Attends Yarsanism Services:     Active Member of Clubs or Organizations:     Attends Club or Organization Meetings:     Marital Status:    Intimate Partner Violence:     Fear of Current or Ex-Partner:     Emotionally Abused:     Physically Abused:     Sexually Abused:          Review of Systems:  Review of Systems   Constitutional: Negative. HENT: Negative. Eyes: Positive for photophobia. Cardiovascular: Negative. Respiratory: Positive for shortness of breath. Endocrine: Negative. Hematologic/Lymphatic: Negative. Skin: Negative. Musculoskeletal: Positive for back pain. Gastrointestinal: Negative. Genitourinary: Negative. Neurological: Positive for headaches. Psychiatric/Behavioral: Positive for depression. The patient is nervous/anxious. Managing         Physical Exam:  There were no vitals taken for this visit. Physical Exam  HENT:      Head:     Neurological:      Mental Status: He is alert. Psychiatric:         Mood and Affect: Mood normal.         Thought Content: Thought content normal.           Assessment:    Problem List Items Addressed This Visit     Intractable migraine without status migrainosus - Primary    Encounter for long-term opiate analgesic use    Cervical spondylosis without myelopathy    Bilateral headaches            Treatment Plan:  DISCUSSION: Treatment options discussed withpatient and all questions answered to patient's satisfaction. Possible side effects, risk of tolerance and or dependence and alternative treatments discussed    Obtaining appropriate analgesic effect of treatment   No signs of potential drug abuse or diversion identified    [x] Ill effects of being on chronic pain medications such as sleep disturbances, respiratory depression, hormonal changes, withdrawal symptoms, chronic opioid dependence and tolerance as well as risk of taking opioids with Benzodiazepines and taking opioids along with alcohol,  werediscussed with patient. I had asked the patient to minimize medication use and utilize pain medications only for uncontrolled rest pain or pain with exertional activities. I advised patient not to self-escalate painmedications without consulting with us. At each of patient's future visits we will try to taper pain medications, while adjusting the adjunct medications, and re-evaluating for Physical Therapy to improve spinal andjoint strength. We will continue to have discussions to decrease pain medications as tolerated.       Counseled patient on effects their pain medication and /or their medical condition mayhave on their  ability to drive or operate machinery. Instructed not to drive or operate machinery if drowsy     I also discussed with the patient regarding the dangers of combining narcotic pain medication with tranquilizers, alcohol or illegal drugs or taking the medication any way other than prescribed. The dangers were discussed  including respiratory depression and death. Patient was told to tell  all  physicians regarding the medications he is getting from pain clinic. Patient is warned not to take any unprescribed medications over-the-countermedications that can depress breathing . Patient is advised to talk to the pharmacist or physicians if planning to take any over-the-counter medications before  takeing them. Patient is strongly advised to avoid tranquilizers or  relaxants, illegal drugs  or any medications that can depress breathing  Patient is also advised to tell us if there is any changes in their medications from other physicians.       1. He states does not need refill right now on Butrans aptch      TREATMENT OPTIONS:       Medication Agreement Requirements Met  Continue Opioid therapy  Script written for  nucynta  Follow up appointment made

## 2021-06-14 ENCOUNTER — HOSPITAL ENCOUNTER (OUTPATIENT)
Dept: PAIN MANAGEMENT | Age: 36
Discharge: HOME | End: 2021-06-14
Payer: MEDICAID

## 2021-06-14 ENCOUNTER — TELEPHONE (OUTPATIENT)
Dept: PAIN MANAGEMENT | Age: 36
End: 2021-06-14

## 2021-06-14 DIAGNOSIS — R51.9 BILATERAL HEADACHES: ICD-10-CM

## 2021-06-14 DIAGNOSIS — G43.519 INTRACTABLE PERSISTENT MIGRAINE AURA WITHOUT CEREBRAL INFARCTION AND WITHOUT STATUS MIGRAINOSUS: ICD-10-CM

## 2021-06-14 DIAGNOSIS — R51.9 GENERALIZED HEADACHES: ICD-10-CM

## 2021-06-14 DIAGNOSIS — G43.919 INTRACTABLE MIGRAINE WITHOUT STATUS MIGRAINOSUS, UNSPECIFIED MIGRAINE TYPE: ICD-10-CM

## 2021-06-14 DIAGNOSIS — M54.2 CERVICALGIA: ICD-10-CM

## 2021-06-14 DIAGNOSIS — Z79.891 ENCOUNTER FOR LONG-TERM OPIATE ANALGESIC USE: ICD-10-CM

## 2021-06-14 DIAGNOSIS — Q98.4 KLINEFELTER SYNDROME: ICD-10-CM

## 2021-06-14 DIAGNOSIS — M47.812 CERVICAL SPONDYLOSIS WITHOUT MYELOPATHY: ICD-10-CM

## 2021-06-14 DIAGNOSIS — G43.819 OTHER MIGRAINE WITHOUT STATUS MIGRAINOSUS, INTRACTABLE: Primary | ICD-10-CM

## 2021-06-14 PROCEDURE — 99213 OFFICE O/P EST LOW 20 MIN: CPT

## 2021-06-14 PROCEDURE — 99212 OFFICE O/P EST SF 10 MIN: CPT | Performed by: NURSE PRACTITIONER

## 2021-06-14 RX ORDER — TAPENTADOL HYDROCHLORIDE 50 MG/1
50 TABLET, FILM COATED ORAL EVERY 8 HOURS
Qty: 90 TABLET | Refills: 0 | Status: SHIPPED | OUTPATIENT
Start: 2021-06-20 | End: 2021-07-15 | Stop reason: SDUPTHER

## 2021-06-14 ASSESSMENT — ENCOUNTER SYMPTOMS
CONSTIPATION: 0
COUGH: 0
SHORTNESS OF BREATH: 0

## 2021-06-14 NOTE — PROGRESS NOTES
Patient completed a telephone visit today to review medication contract. Chief Complaint: headache    PMH  He c/o headache left side which is unchanged. He is on Butrans and nucynta which help. He has long standing history of Migraines. He was seen at [facility] in [facility]. He tried Aimovig for two months with no benefit. He has tried accupressure, chiropractor and yoga for pain relief. He is opioid dependant for pain control and takes Nucynta and butrans patch. Headache   This is a chronic problem. The current episode started more than 1 year ago. The problem occurs constantly. The problem has been unchanged. The pain is located in the temporal region. The pain does not radiate. The quality of the pain is described as aching. The pain is at a severity of 6/10. The pain is moderate. Pertinent negatives include no coughing or fever. The symptoms are aggravated by bright light and noise. He has tried oral narcotics and darkened room for the symptoms. The treatment provided moderate relief. Patient denies any new neurological symptoms. No bowel or bladder incontinence, no weakness, and no falling. Pill count: appropriate due 6/20    Morphine equivalent: 60    Periodic Controlled Substance Monitoring: Possible medication side effects, risk of tolerance/dependence & alternative treatments discussed., No signs of potential drug abuse or diversion identified., Obtaining appropriate analgesic effect of treatment.  Kem Gerardo, KASSANDRA - CNP)      Past Medical History:   Diagnosis Date    Asthma     Depression     Headache(784.0)     Klinefelter syndrome        Past Surgical History:   Procedure Laterality Date    UPPP UVULOPALATOPHARYGOPLASTY         Allergies   Allergen Reactions    Food      Bananas, eggs, milk, cherries    Clams   Upset stomach    Seasonal      Hay fever      Shellfish-Derived Products      Stomach issues         Current Outpatient Medications:    tapentadol (NUCYNTA) 50 MG TABS, Take 1 tablet by mouth every 8 hours for 30 days. , Disp: 90 tablet, Rfl: 0    B-D 3CC LUER-BRANDI SYR 43KB7-7/2 22G X 1-1/2\" 3 ML MISC, USE AS DIRECTED WITH TESTOSTERONE, Disp: , Rfl:     divalproex (DEPAKOTE ER) 250 MG extended release tablet, TAKE ONE TABLET BY MOUTH TWICE DAILY, Disp: , Rfl:     testosterone cypionate (DEPOTESTOTERONE CYPIONATE) 200 MG/ML injection, INJECT 1.25 MILLILITER INTRAMUSCULARLY EVERY 2 WEEKS, Disp: , Rfl:     anastrozole (ARIMIDEX) 1 MG tablet, Take 1 mg by mouth daily takes 1/2 tab, Disp: , Rfl:     DULoxetine (CYMBALTA) 20 MG extended release capsule, TAKE 1 CAPSULE BY MOUTH ONCE A DAY, Disp: , Rfl: 2    nadolol (CORGARD) 40 MG tablet, 1/4-1/2 AS DIRECTED FOR TREMOR, Disp: , Rfl: 5    VENTOLIN  (90 Base) MCG/ACT inhaler, TWO SPRAYS BY MOUTH FOUR TIMES A DAY AS NEEDED, Disp: , Rfl: 5    loratadine-pseudoephedrine (CLARITIN-D 12 HOUR) 5-120 MG per extended release tablet, Take 1 tablet by mouth 2 times daily, Disp: , Rfl:     baclofen (LIORESAL) 10 MG tablet, Take 10 mg by mouth 3 times daily, Disp: , Rfl:     cyclobenzaprine (FLEXERIL) 10 MG tablet, , Disp: , Rfl:     Family History   Problem Relation Age of Onset    High Blood Pressure Father     Mental Illness Sister     High Blood Pressure Paternal Grandmother     Cancer Paternal Grandmother     Heart Disease Paternal Grandfather     High Blood Pressure Paternal Grandfather     Stroke Paternal Grandfather        Social History     Socioeconomic History    Marital status: Single     Spouse name: Not on file    Number of children: Not on file    Years of education: Not on file    Highest education level: Not on file   Occupational History    Occupation: disability   Tobacco Use    Smoking status: Never Smoker    Smokeless tobacco: Never Used   Vaping Use    Vaping Use: Never used   Substance and Sexual Activity    Alcohol use: Yes     Comment: rare , hard cider or wine    Drug use: No    Sexual activity: Never   Other Topics Concern    Not on file   Social History Narrative    Not on file     Social Determinants of Health     Financial Resource Strain:     Difficulty of Paying Living Expenses:    Food Insecurity:     Worried About Running Out of Food in the Last Year:     [address] in the Last Year:    Transportation Needs:     Lack of Transportation (Medical):  Lack of Transportation (Non-Medical):    Physical Activity:     Days of Exercise per Week:     Minutes of Exercise per Session:    Stress:     Feeling of Stress :    Social Connections:     Frequency of Communication with Friends and Family:     Frequency of Social Gatherings with Friends and Family:     Attends Advent Services:     Active Member of Clubs or Organizations:     Attends Club or Organization Meetings:     Marital Status:    Intimate Partner Violence:     Fear of Current or Ex-Partner:     Emotionally Abused:     Physically Abused:     Sexually Abused:        Review of Systems:  Review of Systems   Constitutional: Negative for chills and fever. Cardiovascular: Negative for chest pain. Respiratory: Negative for cough and shortness of breath. Gastrointestinal: Negative for constipation. Neurological: Positive for headaches. Negative for disturbances in coordination. Physical Exam:  There were no vitals taken for this visit. Physical Exam  Neurological:      Mental Status: He is alert.    Psychiatric:      Comments: Flat affect         Record/Diagnostics Review:    Last cipriano 2/2020 and was appropriate     ABERRANT BEHAVIORS SINCE LAST VISIT  Lost rx/pills:------------------------------------------ no  Taking  medication as prescribed: ----------- yes  Urine Drug Screen ---------------------------------  yes             Date------------------------------------------------- 2/21/2020              Results as expected ---------------------yes     Recent ER visits: -------------------------------------No  Pill count is appropriate: ---------------------------yes   Refills for prescriptions appropriate:---------- yes    Assessment:  Problem List Items Addressed This Visit     Intractable migraine without status migrainosus - Primary    Relevant Medications    tapentadol (NUCYNTA) 50 MG TABS (Start on 6/20/2021)    Other Relevant Orders    DRUG SCREEN, PAIN    Bilateral headaches    Relevant Medications    tapentadol (NUCYNTA) 50 MG TABS (Start on 6/20/2021)    Other Relevant Orders    DRUG SCREEN, PAIN    Encounter for long-term opiate analgesic use    Relevant Medications    tapentadol (NUCYNTA) 50 MG TABS (Start on 6/20/2021)    Other Relevant Orders    DRUG SCREEN, PAIN    Cervicalgia    Relevant Medications    tapentadol (NUCYNTA) 50 MG TABS (Start on 6/20/2021)    Cervical spondylosis without myelopathy    Relevant Medications    tapentadol (NUCYNTA) 50 MG TABS (Start on 6/20/2021)    Generalized headaches    Relevant Medications    tapentadol (NUCYNTA) 50 MG TABS (Start on 6/20/2021)    Klinefelter syndrome             Treatment Plan:  Patient relates current medications are helping the pain. Patient reports taking pain medications as prescribed, denies obtaining medications from different sources and denies use of illegal drugs. Patient denies side effects from medications like nausea, vomiting, constipation or drowsiness. Patient reports current activities of daily living are possible due to medications and would like to continue them. As always, we encourage daily stretching and strengthening exercises, and recommend minimizing use of pain medications unless patient cannot get through daily activities due to pain. Contract requirements met. Continue opioid therapy.  Script written for Nucynta - does not need Butrans  UDS - pt instructed to go to lab  Follow up appointment made for 4 weeks    Karin Nova, was evaluated through a synchronous (real-time) audio-video encounter. The patient (or guardian if applicable) is aware that this is a billable service. Verbal consent to proceed has been obtained within the past 12 months. The visit was conducted pursuant to the emergency declaration under the [address] [place] [address], [address] [place] [address] authority and the [employer] General Act. Patient identification was verified, and a caregiver was present when appropriate. The patient was located in a state where the provider was credentialed to provide care. Total time spent for this encounter: 10 minutes    --KASSANDRA Lomas CNP on 6/14/2021 at 1:56 PM    An electronic signature was used to authenticate this note.

## 2021-06-14 NOTE — TELEPHONE ENCOUNTER
Spoke with patient and advised him that Mariann Bloch CNP would like him to get a UDS within the next 24 hours at any [facility] location. Patient verbalized understanding.

## 2021-06-15 ENCOUNTER — HOSPITAL ENCOUNTER (OUTPATIENT)
Age: 36
Discharge: HOME | End: 2021-06-15
Payer: MEDICAID

## 2021-06-15 DIAGNOSIS — Z79.891 ENCOUNTER FOR LONG-TERM OPIATE ANALGESIC USE: ICD-10-CM

## 2021-06-15 DIAGNOSIS — G43.819 OTHER MIGRAINE WITHOUT STATUS MIGRAINOSUS, INTRACTABLE: ICD-10-CM

## 2021-06-15 DIAGNOSIS — R51.9 BILATERAL HEADACHES: ICD-10-CM

## 2021-06-15 PROCEDURE — 80307 DRUG TEST PRSMV CHEM ANLYZR: CPT

## 2021-06-19 LAB
6-ACETYLMORPHINE, UR: NOT DETECTED
7-AMINOCLONAZEPAM, URINE: NOT DETECTED
ALPHA-OH-ALPRAZ, URINE: NOT DETECTED
ALPHA-OH-MIDAZOLAM, URINE: NOT DETECTED
ALPRAZOLAM, URINE: NOT DETECTED
AMPHETAMINES, URINE: NOT DETECTED
BARBITURATES, URINE: NOT DETECTED
BENZOYLECGONINE, UR: NOT DETECTED
BUPRENORPHINE URINE: NOT DETECTED
CARISOPRODOL, UR: NOT DETECTED
CLONAZEPAM, URINE: NOT DETECTED
CODEINE, URINE: NOT DETECTED
CREATININE URINE: 311.4 MG/DL (ref 20–400)
DIAZEPAM, URINE: NOT DETECTED
DRUGS EXPECTED, UR: NORMAL
EER HI RES INTERP UR: NORMAL
ETHYL GLUCURONIDE UR: NOT DETECTED
FENTANYL URINE: NOT DETECTED
GABAPENTIN: NOT DETECTED
HYDROCODONE, URINE: NOT DETECTED
HYDROMORPHONE, URINE: NOT DETECTED
LORAZEPAM, URINE: NOT DETECTED
MARIJUANA METAB, UR: NOT DETECTED
MDA, UR: NOT DETECTED
MDEA, EVE, UR: NOT DETECTED
MDMA URINE: NOT DETECTED
MEPERIDINE METAB, UR: NOT DETECTED
METHADONE, URINE: NOT DETECTED
METHAMPHETAMINE, URINE: NOT DETECTED
METHYLPHENIDATE: NOT DETECTED
MIDAZOLAM, URINE: NOT DETECTED
MORPHINE URINE: NOT DETECTED
NALOXONE URINE: NOT DETECTED
NORBUPRENORPHINE, URINE: NOT DETECTED
NORDIAZEPAM, URINE: NOT DETECTED
NORFENTANYL, URINE: NOT DETECTED
NORHYDROCODONE, URINE: NOT DETECTED
NOROXYCODONE, URINE: NOT DETECTED
NOROXYMORPHONE, URINE: NOT DETECTED
OXAZEPAM, URINE: NOT DETECTED
OXYCODONE URINE: NOT DETECTED
OXYMORPHONE, URINE: NOT DETECTED
PAIN MANAGEMENT DRUG PANEL INTERP, URINE: NORMAL
PAIN MGT DRUG PANEL, HI RES, UR: NORMAL
PCP,URINE: NOT DETECTED
PHENTERMINE, UR: NOT DETECTED
PREGABALIN: NOT DETECTED
TAPENTADOL, URINE: PRESENT
TAPENTADOL-O-SULFATE, URINE: PRESENT
TEMAZEPAM, URINE: NOT DETECTED
TRAMADOL, URINE: NOT DETECTED
ZOLPIDEM METABOLITE (ZCA), URINE: NOT DETECTED
ZOLPIDEM, URINE: NOT DETECTED

## 2021-07-13 ENCOUNTER — CLINICAL DOCUMENTATION (OUTPATIENT)
Dept: PAIN MANAGEMENT | Age: 36
End: 2021-07-13

## 2021-07-15 ENCOUNTER — HOSPITAL ENCOUNTER (OUTPATIENT)
Dept: PAIN MANAGEMENT | Age: 36
Discharge: HOME | End: 2021-07-15
Payer: MEDICAID

## 2021-07-15 DIAGNOSIS — R51.9 GENERALIZED HEADACHES: ICD-10-CM

## 2021-07-15 DIAGNOSIS — M47.812 CERVICAL SPONDYLOSIS WITHOUT MYELOPATHY: ICD-10-CM

## 2021-07-15 DIAGNOSIS — Z79.891 ENCOUNTER FOR LONG-TERM OPIATE ANALGESIC USE: ICD-10-CM

## 2021-07-15 DIAGNOSIS — M54.2 CERVICALGIA: ICD-10-CM

## 2021-07-15 DIAGNOSIS — R51.9 BILATERAL HEADACHES: ICD-10-CM

## 2021-07-15 DIAGNOSIS — G43.919 INTRACTABLE MIGRAINE WITHOUT STATUS MIGRAINOSUS, UNSPECIFIED MIGRAINE TYPE: ICD-10-CM

## 2021-07-15 DIAGNOSIS — Q98.4 KLINEFELTER SYNDROME: ICD-10-CM

## 2021-07-15 DIAGNOSIS — G43.519 INTRACTABLE PERSISTENT MIGRAINE AURA WITHOUT CEREBRAL INFARCTION AND WITHOUT STATUS MIGRAINOSUS: Primary | ICD-10-CM

## 2021-07-15 PROCEDURE — 99213 OFFICE O/P EST LOW 20 MIN: CPT

## 2021-07-15 PROCEDURE — 99212 OFFICE O/P EST SF 10 MIN: CPT | Performed by: NURSE PRACTITIONER

## 2021-07-15 RX ORDER — TAPENTADOL HYDROCHLORIDE 50 MG/1
50 TABLET, FILM COATED ORAL EVERY 8 HOURS
Qty: 90 TABLET | Refills: 0 | Status: SHIPPED | OUTPATIENT
Start: 2021-07-20 | End: 2021-08-16 | Stop reason: SDUPTHER

## 2021-07-15 RX ORDER — BUPRENORPHINE 5 UG/H
1 PATCH, EXTENDED RELEASE TRANSDERMAL WEEKLY
Qty: 4 PATCH | Refills: 0 | Status: SHIPPED | OUTPATIENT
Start: 2021-07-15 | End: 2021-09-02 | Stop reason: SDUPTHER

## 2021-07-15 ASSESSMENT — ENCOUNTER SYMPTOMS
SHORTNESS OF BREATH: 1
GASTROINTESTINAL NEGATIVE: 1
PHOTOPHOBIA: 1

## 2021-07-15 NOTE — PROGRESS NOTES
[facility] PROGRESS NOTE      Patient  completed []  video visit   [x]   phone call:      8   Minutes :       [x]    to  review Medication Agreement    []  Follow up after procedure   []  Discuss treatment options      Location:  Provider:  working from    [x]    home    []   [facility] - LOURDES SIMMONS ,   patient at home       Chief Complaint: migraine headache      He c/o migraine headache which has not changed. The frequency of his headaches are unchanged. He has along standing history of migraine headaches since he was a teenager. He has tried multiple therapies and been to [facility] headache clinic in [facility]. He sleeps okay. He states has been more active, riding his bike. Migraine   This is a chronic problem. The problem occurs constantly. The problem has been unchanged. The pain is located in the bilateral (all over) region. The pain does not radiate. The pain quality is similar to prior headaches. The quality of the pain is described as aching. The pain is at a severity of 5/10. Associated symptoms include phonophobia and photophobia. The symptoms are aggravated by noise. He has tried cold packs and darkened room for the symptoms. His past medical history is significant for migraine headaches and migraines in the family.          Treatment goals:  Functional status: to do more      Aberrancy:   Any alcoholic beverages no           Any illegal drugs   no    Analgesia:    5                 Adverse  Effects :itching with nucynta    ADL;s :riding bike     Data:    When was thelast UDS:    6-        Was the UDS appropriate:  [] yes [x]   no  butrans did not show up but a script lasts him 2 months   Record/Diagnostics Review:      As above, I did review the imaging       6/19/2021 10:13 AM - Kash Gottlieb Incoming Lab Results From [facility]    Component Value Ref Range & Units Status Collected Lab   Pain Management Drug Panel Interp, Urine Inconsistent   Final 06/15/2021  1:00 PM [facility]   (NOTE) ________________________________________________________________   DRUGS EXPECTED:   NUCYNTA (TAPENTADOL) [TODAY]   BUTRANS (BUPRENORPHINE)   ________________________________________________________________   CONSISTENT with medications provided:   NUCYNTA (TAPENTADOL) : based on tapentadol, tapentadol-o-sulfate   ________________________________________________________________   INCONSISTENT with medications provided:   BUTRANS (BUPRENORPHINE): based on the absence of buprenorphine and   metabolites   ________________________________________________________________   INTERPRETIVE INFORMATION: Targeted drug profile Interp   Interpretation depends on accuracy and completeness of patient                  Pill count: appropriate    fill date :7- for nucynta    butrans patch last filled 4-    Morphine equivalent dose as reported on OARRS:  Periodic Controlled Substance Monitoring: Possible medication side effects, risk of tolerance/dependence & alternative treatments discussed., No signs of potential drug abuse or diversion identified. , Assessed functional status., Obtaining appropriate analgesic effect of treatment. Ly Almaguer APRN - CNP)  Review ofOARRS does not show any aberrant prescription behavior. Medication is helping the patient stay active. Patient denies any side effects and reports adequate analgesia. No sign of misuse/abuse. Past Medical History:   Diagnosis Date    Asthma     Depression     Headache(784.0)     Klinefelter syndrome        Past Surgical History:   Procedure Laterality Date    UPPP UVULOPALATOPHARYGOPLASTY         Allergies   Allergen Reactions    Food      Bananas, eggs, milk, cherries    Clams   Upset stomach    Seasonal      Hay fever      Shellfish-Derived Products      Stomach issues         Current Outpatient Medications:     tapentadol (NUCYNTA) 50 MG TABS, Take 1 tablet by mouth every 8 hours for 30 days. , Disp: 90 tablet, Rfl: 0    divalproex (DEPAKOTE ER) 250 MG extended release tablet, TAKE ONE TABLET BY MOUTH TWICE DAILY, Disp: , Rfl:     anastrozole (ARIMIDEX) 1 MG tablet, Take 1 mg by mouth daily takes 1/2 tab, Disp: , Rfl:     DULoxetine (CYMBALTA) 20 MG extended release capsule, TAKE 1 CAPSULE BY MOUTH ONCE A DAY, Disp: , Rfl: 2    loratadine-pseudoephedrine (CLARITIN-D 12 HOUR) 5-120 MG per extended release tablet, Take 1 tablet by mouth 2 times daily, Disp: , Rfl:     B-D 3CC LUER-BRANDI SYR 66DX0-1/2 22G X 1-1/2\" 3 ML MISC, USE AS DIRECTED WITH TESTOSTERONE, Disp: , Rfl:     testosterone cypionate (DEPOTESTOTERONE CYPIONATE) 200 MG/ML injection, INJECT 1.25 MILLILITER INTRAMUSCULARLY EVERY 2 WEEKS, Disp: , Rfl:     nadolol (CORGARD) 40 MG tablet, 1/4-1/2 AS DIRECTED FOR TREMOR, Disp: , Rfl: 5    VENTOLIN  (90 Base) MCG/ACT inhaler, TWO SPRAYS BY MOUTH FOUR TIMES A DAY AS NEEDED, Disp: , Rfl: 5    baclofen (LIORESAL) 10 MG tablet, Take 10 mg by mouth 3 times daily, Disp: , Rfl:     cyclobenzaprine (FLEXERIL) 10 MG tablet, , Disp: , Rfl:     Family History   Problem Relation Age of Onset    High Blood Pressure Father     Mental Illness Sister     High Blood Pressure Paternal Grandmother     Cancer Paternal Grandmother     Heart Disease Paternal Grandfather     High Blood Pressure Paternal Grandfather     Stroke Paternal Grandfather        Social History     Socioeconomic History    Marital status: Single     Spouse name: Not on file    Number of children: Not on file    Years of education: Not on file    Highest education level: Not on file   Occupational History    Occupation: disability   Tobacco Use    Smoking status: Never Smoker    Smokeless tobacco: Never Used   Vaping Use    Vaping Use: Never used   Substance and Sexual Activity    Alcohol use: Yes     Comment: rare , hard cider or wine    Drug use: No    Sexual activity: Never   Other Topics Concern    Not on file   Social History Narrative    Not on file Social Determinants of Health     Financial Resource Strain:     Difficulty of Paying Living Expenses:    Food Insecurity:     Worried About Running Out of Food in the Last Year:     [address] in the Last Year:    Transportation Needs:     Lack of Transportation (Medical):  Lack of Transportation (Non-Medical):    Physical Activity:     Days of Exercise per Week:     Minutes of Exercise per Session:    Stress:     Feeling of Stress :    Social Connections:     Frequency of Communication with Friends and Family:     Frequency of Social Gatherings with Friends and Family:     Attends Buddhist Services:     Active Member of Clubs or Organizations:     Attends Club or Organization Meetings:     Marital Status:    Intimate Partner Violence:     Fear of Current or Ex-Partner:     Emotionally Abused:     Physically Abused:     Sexually Abused:          Review of Systems:  Review of Systems   Constitutional: Negative. HENT: Negative. Eyes: Positive for photophobia. Cardiovascular: Negative. Respiratory: Positive for shortness of breath. Endocrine: Negative. Hematologic/Lymphatic: Negative. Skin: Negative. Musculoskeletal: Positive for joint pain. Gastrointestinal: Negative. Genitourinary: Negative. Neurological: Positive for headaches. Psychiatric/Behavioral: Positive for depression. The patient is nervous/anxious. Managing         Physical Exam:  There were no vitals taken for this visit. Physical Exam  HENT:      Head:     Neurological:      Mental Status: He is alert and oriented to person, place, and time. Psychiatric:         Mood and Affect: Mood normal.         Thought Content:  Thought content normal.           Assessment:    Problem List Items Addressed This Visit     Intractable migraine without status migrainosus - Primary    Encounter for long-term opiate analgesic use    Cervicalgia    Cervical spondylosis without myelopathy Bilateral headaches            Treatment Plan:  DISCUSSION: Treatment options discussed withpatient and all questions answered to patient's satisfaction. Possible side effects, risk of tolerance and or dependence and alternative treatments discussed    Obtaining appropriate analgesic effect of treatment   No signs of potential drug abuse or diversion identified    [x] Ill effects of being on chronic pain medications such as sleep disturbances, respiratory depression, hormonal changes, withdrawal symptoms, chronic opioid dependence and tolerance as well as risk of taking opioids with Benzodiazepines and taking opioids along with alcohol,  werediscussed with patient. I had asked the patient to minimize medication use and utilize pain medications only for uncontrolled rest pain or pain with exertional activities. I advised patient not to self-escalate painmedications without consulting with us. At each of patient's future visits we will try to taper pain medications, while adjusting the adjunct medications, and re-evaluating for Physical Therapy to improve spinal andjoint strength. We will continue to have discussions to decrease pain medications as tolerated. Counseled patient on effects their pain medication and /or their medical condition mayhave on their  ability to drive or operate machinery. Instructed not to drive or operate machinery if drowsy     I also discussed with the patient regarding the dangers of combining narcotic pain medication with tranquilizers, alcohol or illegal drugs or taking the medication any way other than prescribed. The dangers were discussed  including respiratory depression and death. Patient was told to tell  all  physicians regarding the medications he is getting from pain clinic. Patient is warned not to take any unprescribed medications over-the-countermedications that can depress breathing .  Patient is advised to talk to the pharmacist or physicians if planning to take any over-the-counter medications before  takeing them. Patient is strongly advised to avoid tranquilizers or  relaxants, illegal drugs  or any medications that can depress breathing  Patient is also advised to tell us if there is any changes in their medications from other physicians.             TREATMENT OPTIONS:       Medication Agreement Requirements Met  Continue Opioid therapy  Script written for  Nucynta, butrans patch  Follow up appointment made

## 2021-08-16 ENCOUNTER — HOSPITAL ENCOUNTER (OUTPATIENT)
Dept: PAIN MANAGEMENT | Age: 36
Discharge: HOME | End: 2021-08-16
Payer: MEDICAID

## 2021-08-16 DIAGNOSIS — Z79.891 ENCOUNTER FOR LONG-TERM OPIATE ANALGESIC USE: ICD-10-CM

## 2021-08-16 DIAGNOSIS — G43.919 INTRACTABLE MIGRAINE WITHOUT STATUS MIGRAINOSUS, UNSPECIFIED MIGRAINE TYPE: ICD-10-CM

## 2021-08-16 DIAGNOSIS — M47.812 CERVICAL SPONDYLOSIS WITHOUT MYELOPATHY: ICD-10-CM

## 2021-08-16 DIAGNOSIS — R51.9 BILATERAL HEADACHES: ICD-10-CM

## 2021-08-16 DIAGNOSIS — R51.9 GENERALIZED HEADACHES: ICD-10-CM

## 2021-08-16 DIAGNOSIS — M54.2 CERVICALGIA: ICD-10-CM

## 2021-08-16 DIAGNOSIS — G43.519 INTRACTABLE PERSISTENT MIGRAINE AURA WITHOUT CEREBRAL INFARCTION AND WITHOUT STATUS MIGRAINOSUS: Primary | ICD-10-CM

## 2021-08-16 PROCEDURE — 99212 OFFICE O/P EST SF 10 MIN: CPT | Performed by: NURSE PRACTITIONER

## 2021-08-16 PROCEDURE — 99213 OFFICE O/P EST LOW 20 MIN: CPT

## 2021-08-16 RX ORDER — TAPENTADOL HYDROCHLORIDE 50 MG/1
50 TABLET, FILM COATED ORAL EVERY 8 HOURS
Qty: 90 TABLET | Refills: 0 | Status: SHIPPED | OUTPATIENT
Start: 2021-08-19 | End: 2021-09-14 | Stop reason: SDUPTHER

## 2021-08-16 ASSESSMENT — ENCOUNTER SYMPTOMS
SHORTNESS OF BREATH: 1
GASTROINTESTINAL NEGATIVE: 1
BACK PAIN: 1
PHOTOPHOBIA: 1

## 2021-08-16 NOTE — PROGRESS NOTES
[facility] PROGRESS NOTE      Patient  completed []  video visit   [x]   phone call:     7    Minutes :       [x]    to  review Medication Agreement    []  Follow up after procedure   []  Discuss treatment options      Location:  Provider:  working from    [x]    home    []   [facility] - [place] ,   patient at  home       Chief Complaint:   migraine headache    He c/o migraine headache unilateral, left side. . He has long standing history of migraines since he was a teenager, He is on nucynta and butrans patch to manage his pain. [facility] was [facility] had been on morphine and methadone in 2017 and had been on topamax. He tried aimovig for 2 months which he states did not improve his headaches.  Prior treatments included  acupressure which was in [place] several years ( 15) years ago. Chiropractic treatment a few different Chiropractor visits, with neck adjustments x 15 years. He reports not sleeping as well due to headaches. He is trying to be more active. Migraine   This is a chronic problem. The problem occurs constantly. The problem has been unchanged. The pain is located in the left unilateral region. The pain does not radiate. The pain quality is similar to prior headaches. The quality of the pain is described as aching. The pain is at a severity of 6/10. Associated symptoms include back pain, phonophobia and photophobia. The symptoms are aggravated by bright light and noise. He has tried darkened room and cold packs for the symptoms.        Treatment goals:  Functional status: be more productive    Aberrancy:   Any alcoholic beverages       no     Any illegal drugs  no       Analgesia:     6                Adverse  Effects :some itching    ADL;s :rides a bike    Data:    When was thelast UDS:  6-          Was the UDS appropriate:  [] yes []   no    Positive for nucynta but negative for buprenorphine    Record/Diagnostics Review:      As above, I did review the imaging   6/19/2021 10:13 AM - Bull, [facility] Incoming Lab Results From [facility]    Component Value Ref Range & Units Status Collected Lab   Pain Management Drug Panel Interp, Urine Inconsistent   Final 06/15/2021  1:00 PM [facility]   (NOTE)   ________________________________________________________________   DRUGS EXPECTED:   NUCYNTA (TAPENTADOL) [TODAY]   BUTRANS (BUPRENORPHINE)   ________________________________________________________________   CONSISTENT with medications provided:   NUCYNTA (TAPENTADOL) : based on tapentadol, tapentadol-o-sulfate   ________________________________________________________________   INCONSISTENT with medications provided:   BUTRANS (BUPRENORPHINE): based on the absence of buprenorphine and   metabolites   ________________________________________________________________   INTERPRETIVE INFORMATION: Targeted drug profile Interp   Interpretation depends on accuracy and completeness of patient   medication information submitted by client. 6-Acetylmorphine, Ur Not Detected   Final 06/15/2021  1:00 PM [facility]   7-Aminoclonazepam, Urine Not Detected   Final              Cervical Spine:  9/21/2016.       Reason for study: Chronic migraines and neck pain        Comparison: None.       Technique: 8 images of the cervical spine were obtained.        Findings:  There is normal alignment without significant alignment shift upon flexion.  There is no acute fracture or subluxation.  The dens is intact.  Oblique views demonstrate the neural foramen to be widely patent bilaterally       The vertebral bodies demonstrate normal height.  The intervertebral disc spaces are well maintained.  There is no prevertebral soft tissue swelling.       Impression: Unremarkable cervical spine radiographs.       Final report electronically signed by Isabel Gay M.D. on 9/21/2016 4:21 PM       Order History                Pill count: appropriate fill date : 8-  Morphine equivalent dose as reported on OARRS:60.12     Review ofOARRS does not show any aberrant prescription behavior. Medication is helping the patient stay active. Patient denies any side effects and reports adequate analgesia. No sign of misuse/abuse. Past Medical History:   Diagnosis Date    Asthma     Depression     Headache(784.0)     Klinefelter syndrome        Past Surgical History:   Procedure Laterality Date    UPPP UVULOPALATOPHARYGOPLASTY         Allergies   Allergen Reactions    Food      Bananas, eggs, milk, cherries    Clams   Upset stomach    Seasonal      Hay fever      Shellfish-Derived Products      Stomach issues         Current Outpatient Medications:     tapentadol (NUCYNTA) 50 MG TABS, Take 1 tablet by mouth every 8 hours for 30 days. , Disp: 90 tablet, Rfl: 0    divalproex (DEPAKOTE ER) 250 MG extended release tablet, TAKE ONE TABLET BY MOUTH TWICE DAILY, Disp: , Rfl:     testosterone cypionate (DEPOTESTOTERONE CYPIONATE) 200 MG/ML injection, INJECT 1.25 MILLILITER INTRAMUSCULARLY EVERY 2 WEEKS, Disp: , Rfl:     anastrozole (ARIMIDEX) 1 MG tablet, Take 1 mg by mouth daily takes 1/2 tab, Disp: , Rfl:     DULoxetine (CYMBALTA) 20 MG extended release capsule, TAKE 1 CAPSULE BY MOUTH ONCE A DAY, Disp: , Rfl: 2    loratadine-pseudoephedrine (CLARITIN-D 12 HOUR) 5-120 MG per extended release tablet, Take 1 tablet by mouth 2 times daily, Disp: , Rfl:     B-D 3CC LUER-BRANDI SYR 41JA9-6/2 22G X 1-1/2\" 3 ML MISC, USE AS DIRECTED WITH TESTOSTERONE, Disp: , Rfl:     nadolol (CORGARD) 40 MG tablet, 1/4-1/2 AS DIRECTED FOR TREMOR, Disp: , Rfl: 5    VENTOLIN  (90 Base) MCG/ACT inhaler, TWO SPRAYS BY MOUTH FOUR TIMES A DAY AS NEEDED, Disp: , Rfl: 5    baclofen (LIORESAL) 10 MG tablet, Take 10 mg by mouth 3 times daily, Disp: , Rfl:     cyclobenzaprine (FLEXERIL) 10 MG tablet, , Disp: , Rfl:     Family History   Problem Relation Age of Onset    High Endocrine: Negative. Hematologic/Lymphatic: Negative. Skin: Negative. Musculoskeletal: Positive for back pain. Gastrointestinal: Negative. Genitourinary: Negative. Neurological: Positive for headaches. Psychiatric/Behavioral: Positive for depression. Managing         Physical Exam:  There were no vitals taken for this visit. Physical Exam  HENT:      Head:     Neurological:      Mental Status: He is alert and oriented to person, place, and time. Psychiatric:         Mood and Affect: Mood normal.         Thought Content: Thought content normal.           Assessment:      Problem List Items Addressed This Visit     Intractable migraine without status migrainosus - Primary    Generalized headaches    Encounter for long-term opiate analgesic use    Cervicalgia    Cervical spondylosis without myelopathy    Bilateral headaches          Treatment Plan:  DISCUSSION: Treatment options discussed withpatient and all questions answered to patient's satisfaction. Possible side effects, risk of tolerance and or dependence and alternative treatments discussed    Obtaining appropriate analgesic effect of treatment   No signs of potential drug abuse or diversion identified    [x] Ill effects of being on chronic pain medications such as sleep disturbances, respiratory depression, hormonal changes, withdrawal symptoms, chronic opioid dependence and tolerance as well as risk of taking opioids with Benzodiazepines and taking opioids along with alcohol,  werediscussed with patient. I had asked the patient to minimize medication use and utilize pain medications only for uncontrolled rest pain or pain with exertional activities. I advised patient not to self-escalate painmedications without consulting with us. At each of patient's future visits we will try to taper pain medications, while adjusting the adjunct medications, and re-evaluating for Physical Therapy to improve spinal andjoint strength.  We will continue to have discussions to decrease pain medications as tolerated. Counseled patient on effects their pain medication and /or their medical condition mayhave on their  ability to drive or operate machinery. Instructed not to drive or operate machinery if drowsy     I also discussed with the patient regarding the dangers of combining narcotic pain medication with tranquilizers, alcohol or illegal drugs or taking the medication any way other than prescribed. The dangers were discussed  including respiratory depression and death. Patient was told to tell  all  physicians regarding the medications he is getting from pain clinic. Patient is warned not to take any unprescribed medications over-the-countermedications that can depress breathing . Patient is advised to talk to the pharmacist or physicians if planning to take any over-the-counter medications before  takeing them. Patient is strongly advised to avoid tranquilizers or  relaxants, illegal drugs  or any medications that can depress breathing  Patient is also advised to tell us if there is any changes in their medications from other physicians.     1, states will need butrans patch on 9-8-2021 will e-prescribe closer to refill date        TREATMENT OPTIONS:       Medication Agreement Requirements Met  Continue Opioid therapy  Script written for  nucynta  Follow up appointment made

## 2021-09-14 ENCOUNTER — HOSPITAL ENCOUNTER (OUTPATIENT)
Dept: PAIN MANAGEMENT | Age: 36
Discharge: HOME | End: 2021-09-14
Payer: MEDICAID

## 2021-09-14 DIAGNOSIS — R51.9 BILATERAL HEADACHES: ICD-10-CM

## 2021-09-14 DIAGNOSIS — G43.519 INTRACTABLE PERSISTENT MIGRAINE AURA WITHOUT CEREBRAL INFARCTION AND WITHOUT STATUS MIGRAINOSUS: ICD-10-CM

## 2021-09-14 DIAGNOSIS — Z79.891 ENCOUNTER FOR LONG-TERM OPIATE ANALGESIC USE: ICD-10-CM

## 2021-09-14 DIAGNOSIS — R51.9 GENERALIZED HEADACHES: ICD-10-CM

## 2021-09-14 DIAGNOSIS — G43.919 INTRACTABLE MIGRAINE WITHOUT STATUS MIGRAINOSUS, UNSPECIFIED MIGRAINE TYPE: ICD-10-CM

## 2021-09-14 DIAGNOSIS — M54.2 CERVICALGIA: ICD-10-CM

## 2021-09-14 DIAGNOSIS — M47.812 CERVICAL SPONDYLOSIS WITHOUT MYELOPATHY: ICD-10-CM

## 2021-09-14 PROCEDURE — 99212 OFFICE O/P EST SF 10 MIN: CPT | Performed by: NURSE PRACTITIONER

## 2021-09-14 PROCEDURE — 99213 OFFICE O/P EST LOW 20 MIN: CPT

## 2021-09-14 RX ORDER — TAPENTADOL HYDROCHLORIDE 50 MG/1
50 TABLET, FILM COATED ORAL EVERY 8 HOURS
Qty: 90 TABLET | Refills: 0 | Status: SHIPPED | OUTPATIENT
Start: 2021-09-17 | End: 2021-10-14 | Stop reason: SDUPTHER

## 2021-09-14 ASSESSMENT — ENCOUNTER SYMPTOMS
SHORTNESS OF BREATH: 1
GASTROINTESTINAL NEGATIVE: 1
EYES NEGATIVE: 1

## 2021-09-14 NOTE — PROGRESS NOTES
[facility] PROGRESS NOTE      Patient  completed []  video visit   []   phone call:     7    Minutes :       [x]    to  review Medication Agreement    []  Follow up after procedure   []  Discuss treatment options      Location:  Provider:  working from    [x]    home    []   [facility] [place] ,   patient at   home    Chief Complaint:  headache    He c/o migraine headache right temporal area which is unchanged,  It is similar to his previous headaches, He has a long standing history of migraines since he was a teenager. He was previously  seen [facility] had been on morphine and methadone in 2017 and had been on topamax. He tried aimovig for 2 months which he states did not  Improve his headaches.  Prior treatments included  acupressure which was in [facility] several years ( 15) years ago. Chiropractic treatment a few different Chiropractor visits, with neck adjustments x 15 years   He is on tapentadol and Butrans patch, for the pain. He reports sleep is okay. He is tying o be more active. Migraine   This is a chronic problem. The current episode started more than 1 year ago. The problem occurs constantly. The problem has been unchanged. The pain is located in the right unilateral region. The pain does not radiate. The pain quality is similar to prior headaches. The quality of the pain is described as aching. The pain is at a severity of 6/10. Associated symptoms comments: aura. The symptoms are aggravated by noise (light). He has tried darkened room and oral narcotics (ice) for the symptoms.                  Treatment goals:  Functional status: be more active      Aberrancy:   Any alcoholic beverages      no      Any illegal drugs  no       Analgesia:           6          Adverse  Effects :some itching with nucynta    ADL;s :trying to be active    Data:    When was thelast UDS:    6-        Was the UDS appropriate:  [] yes [] no  Buprenorphine did not show up    Record/Diagnostics Review:      As above, I did review the imaging /19/2021 10:13 AM - Bull, Kash Incoming Lab Results From [facility]    Component Value Ref Range & Units Status Collected Lab   Pain Management Drug Panel Interp, Urine Inconsistent   Final 06/15/2021  1:00 PM [facility]   (NOTE)   ________________________________________________________________   DRUGS EXPECTED:   NUCYNTA (TAPENTADOL) [TODAY]   BUTRANS (BUPRENORPHINE)   ________________________________________________________________   CONSISTENT with medications provided:   NUCYNTA (TAPENTADOL) : based on tapentadol, tapentadol-o-sulfate   ________________________________________________________________   INCONSISTENT with medications provided:   BUTRANS (BUPRENORPHINE): based on the absence of buprenorphine and   metabolites   ________________________________________________________________   INTERPRETIVE INFORMATION: Targeted drug profile Interp   Interpretation depends on accuracy and completeness of patient   medication information submitted by client. Pill count: appropriate    fill date :9-    Morphine equivalent dose as reported on OARRS:60.12  Periodic Controlled Substance Monitoring: Possible medication side effects, risk of tolerance/dependence & alternative treatments discussed., No signs of potential drug abuse or diversion identified. , Assessed functional status., Obtaining appropriate analgesic effect of treatment. KASSANDRA Webster - CNP)  Acute Pain Prescriptions: Severe pain not adequately treated with lower dose. Jocelyn Lao, KASSANDRA - CNP)  Review ofOARRS does not show any aberrant prescription behavior. Medication is helping the patient stay active. Patient denies any side effects and reports adequate analgesia. No sign of misuse/abuse.             Past Medical History:   Diagnosis Date    Asthma     Depression     Headache(784.0)     Klinefelter syndrome        Past Surgical History:   Procedure Laterality Date    UPPP UVULOPALATOPHARYGOPLASTY         Allergies   Allergen Reactions    Food      Bananas, eggs, milk, cherries    Clams   Upset stomach    Seasonal      Hay fever      Shellfish-Derived Products      Stomach issues         Current Outpatient Medications:     BUTRANS 5 MCG/HR PTWK, Place 1 patch onto the skin once a week for 28 days. , Disp: 4 patch, Rfl: 0    tapentadol (NUCYNTA) 50 MG TABS, Take 1 tablet by mouth every 8 hours for 30 days. , Disp: 90 tablet, Rfl: 0    divalproex (DEPAKOTE ER) 250 MG extended release tablet, TAKE ONE TABLET BY MOUTH TWICE DAILY, Disp: , Rfl:     anastrozole (ARIMIDEX) 1 MG tablet, Take 1 mg by mouth daily takes 1/2 tab, Disp: , Rfl:     DULoxetine (CYMBALTA) 20 MG extended release capsule, TAKE 1 CAPSULE BY MOUTH ONCE A DAY, Disp: , Rfl: 2    loratadine-pseudoephedrine (CLARITIN-D 12 HOUR) 5-120 MG per extended release tablet, Take 1 tablet by mouth 2 times daily, Disp: , Rfl:     B-D 3CC LUER-BRANDI SYR 88NU5-2/2 22G X 1-1/2\" 3 ML MISC, USE AS DIRECTED WITH TESTOSTERONE, Disp: , Rfl:     testosterone cypionate (DEPOTESTOTERONE CYPIONATE) 200 MG/ML injection, INJECT 1.25 MILLILITER INTRAMUSCULARLY EVERY 2 WEEKS, Disp: , Rfl:     nadolol (CORGARD) 40 MG tablet, 1/4-1/2 AS DIRECTED FOR TREMOR, Disp: , Rfl: 5    VENTOLIN  (90 Base) MCG/ACT inhaler, TWO SPRAYS BY MOUTH FOUR TIMES A DAY AS NEEDED, Disp: , Rfl: 5    baclofen (LIORESAL) 10 MG tablet, Take 10 mg by mouth 3 times daily, Disp: , Rfl:     cyclobenzaprine (FLEXERIL) 10 MG tablet, , Disp: , Rfl:     Family History   Problem Relation Age of Onset    High Blood Pressure Father     Mental Illness Sister     High Blood Pressure Paternal Grandmother     Cancer Paternal Grandmother     Heart Disease Paternal Grandfather     High Blood Pressure Paternal Grandfather     Stroke Paternal Grandfather        Social History     Socioeconomic History    Marital status: Single     Spouse name: Not on file    Number of children: Not on file    Years of education: Not on file    Highest education level: Not on file   Occupational History    Occupation: disability   Tobacco Use    Smoking status: Never Smoker    Smokeless tobacco: Never Used   Vaping Use    Vaping Use: Never used   Substance and Sexual Activity    Alcohol use: Yes     Comment: rare , hard cider or wine    Drug use: No    Sexual activity: Never   Other Topics Concern    Not on file   Social History Narrative    Not on file     Social Determinants of Health     Financial Resource Strain:     Difficulty of Paying Living Expenses:    Food Insecurity:     Worried About Running Out of Food in the Last Year:     [address] in the Last Year:    Transportation Needs:     Lack of Transportation (Medical):  Lack of Transportation (Non-Medical):    Physical Activity:     Days of Exercise per Week:     Minutes of Exercise per Session:    Stress:     Feeling of Stress :    Social Connections:     Frequency of Communication with Friends and Family:     Frequency of Social Gatherings with Friends and Family:     Attends Sikhism Services:     Active Member of Clubs or Organizations:     Attends Club or Organization Meetings:     Marital Status:    Intimate Partner Violence:     Fear of Current or Ex-Partner:     Emotionally Abused:     Physically Abused:     Sexually Abused:          Review of Systems:  Review of Systems   Constitutional: Negative. HENT: Negative. Eyes: Negative. Cardiovascular: Negative. Respiratory: Positive for shortness of breath. Endocrine: Negative. Hematologic/Lymphatic: Negative. Skin: Negative. Musculoskeletal: Negative. Gastrointestinal: Negative. Genitourinary: Negative. Neurological: Positive for headaches. Psychiatric/Behavioral: Positive for depression.         Managing         Physical Exam:  There were no vitals taken for this visit. Physical Exam  HENT:      Head:     Neurological:      Mental Status: He is alert and oriented to person, place, and time. Psychiatric:         Mood and Affect: Mood normal.         Behavior: Behavior normal.         Thought Content: Thought content normal.           Assessment:        Problem List Items Addressed This Visit     Intractable migraine without status migrainosus    Relevant Medications    tapentadol (NUCYNTA) 50 MG TABS (Start on 9/17/2021)    Generalized headaches    Relevant Medications    tapentadol (NUCYNTA) 50 MG TABS (Start on 9/17/2021)    Encounter for long-term opiate analgesic use    Relevant Medications    tapentadol (NUCYNTA) 50 MG TABS (Start on 9/17/2021)    Cervicalgia    Relevant Medications    tapentadol (NUCYNTA) 50 MG TABS (Start on 9/17/2021)    Cervical spondylosis without myelopathy    Relevant Medications    tapentadol (NUCYNTA) 50 MG TABS (Start on 9/17/2021)    Bilateral headaches    Relevant Medications    tapentadol (NUCYNTA) 50 MG TABS (Start on 9/17/2021)          Treatment Plan:  DISCUSSION: Treatment options discussed withpatient and all questions answered to patient's satisfaction. Possible side effects, risk of tolerance and or dependence and alternative treatments discussed    Obtaining appropriate analgesic effect of treatment   No signs of potential drug abuse or diversion identified    [x] Ill effects of being on chronic pain medications such as sleep disturbances, respiratory depression, hormonal changes, withdrawal symptoms, chronic opioid dependence and tolerance as well as risk of taking opioids with Benzodiazepines and taking opioids along with alcohol,  werediscussed with patient. I had asked the patient to minimize medication use and utilize pain medications only for uncontrolled rest pain or pain with exertional activities. I advised patient not to self-escalate painmedications without consulting with us.   At each of patient's future visits we will try to taper pain medications, while adjusting the adjunct medications, and re-evaluating for Physical Therapy to improve spinal andjoint strength. We will continue to have discussions to decrease pain medications as tolerated. Counseled patient on effects their pain medication and /or their medical condition mayhave on their  ability to drive or operate machinery. Instructed not to drive or operate machinery if drowsy     I also discussed with the patient regarding the dangers of combining narcotic pain medication with tranquilizers, alcohol or illegal drugs or taking the medication any way other than prescribed. The dangers were discussed  including respiratory depression and death. Patient was told to tell  all  physicians regarding the medications he is getting from pain clinic. Patient is warned not to take any unprescribed medications over-the-countermedications that can depress breathing . Patient is advised to talk to the pharmacist or physicians if planning to take any over-the-counter medications before  takeing them. Patient is strongly advised to avoid tranquilizers or  relaxants, illegal drugs  or any medications that can depress breathing  Patient is also advised to tell us if there is any changes in their medications from other physicians.             TREATMENT OPTIONS:       Medication Agreement Requirements Met  Continue Opioid therapy  Script written for  nucynta  Follow up appointment made

## 2021-10-14 ENCOUNTER — HOSPITAL ENCOUNTER (OUTPATIENT)
Dept: PAIN MANAGEMENT | Age: 36
Discharge: HOME | End: 2021-10-14
Payer: MEDICAID

## 2021-10-14 DIAGNOSIS — Z79.891 ENCOUNTER FOR LONG-TERM OPIATE ANALGESIC USE: ICD-10-CM

## 2021-10-14 DIAGNOSIS — M47.812 CERVICAL SPONDYLOSIS WITHOUT MYELOPATHY: ICD-10-CM

## 2021-10-14 DIAGNOSIS — R51.9 GENERALIZED HEADACHES: ICD-10-CM

## 2021-10-14 DIAGNOSIS — R51.9 BILATERAL HEADACHES: ICD-10-CM

## 2021-10-14 DIAGNOSIS — M54.2 CERVICALGIA: ICD-10-CM

## 2021-10-14 DIAGNOSIS — G43.519 INTRACTABLE PERSISTENT MIGRAINE AURA WITHOUT CEREBRAL INFARCTION AND WITHOUT STATUS MIGRAINOSUS: ICD-10-CM

## 2021-10-14 DIAGNOSIS — G43.919 INTRACTABLE MIGRAINE WITHOUT STATUS MIGRAINOSUS, UNSPECIFIED MIGRAINE TYPE: ICD-10-CM

## 2021-10-14 DIAGNOSIS — Q98.4 KLINEFELTER SYNDROME: ICD-10-CM

## 2021-10-14 PROCEDURE — 99214 OFFICE O/P EST MOD 30 MIN: CPT | Performed by: PAIN MEDICINE

## 2021-10-14 PROCEDURE — 99213 OFFICE O/P EST LOW 20 MIN: CPT

## 2021-10-14 RX ORDER — BUPRENORPHINE 5 UG/H
1 PATCH, EXTENDED RELEASE TRANSDERMAL WEEKLY
Qty: 4 PATCH | Refills: 0 | Status: SHIPPED | OUTPATIENT
Start: 2021-10-17 | End: 2021-11-11 | Stop reason: SDUPTHER

## 2021-10-14 RX ORDER — TAPENTADOL HYDROCHLORIDE 50 MG/1
50 TABLET, FILM COATED ORAL EVERY 8 HOURS
Qty: 90 TABLET | Refills: 0 | Status: SHIPPED | OUTPATIENT
Start: 2021-10-17 | End: 2021-11-11 | Stop reason: SDUPTHER

## 2021-10-14 ASSESSMENT — ENCOUNTER SYMPTOMS
COUGH: 0
BACK PAIN: 0
EYE REDNESS: 0
VOMITING: 0
BLURRED VISION: 0
EYE PAIN: 0
SHORTNESS OF BREATH: 0
CONSTIPATION: 0
NAUSEA: 0
SORE THROAT: 0
ABDOMINAL PAIN: 0
PHOTOPHOBIA: 1
EYE WATERING: 0

## 2021-10-14 NOTE — PROGRESS NOTES
Jeff Guadalupe is a 31 y.o. male evaluated on 10/14/2021. Modality of virtual service provided -via telephone   Consent:  Patient and/or health care decision maker is aware that that patient may receive a bill for this telephone service, depending on one's insurance coverage, and has provided verbal consent to proceed: Yes    Patient identification was verified at the start of the visit: Yes    Chief complaint: Jeff Guadalupe is 31 y.o.,  male, with  with chief complaint of  headache. .    Patient is complaining of pain involving entire scalp. He is headache is constant patient has been diagnosed with chronic migraine headaches. He tried several medications without much avail at present he is on Butrans and Nucynta which is helping the pain to a certain extent. He reports there is not much change. Rarely the headache is associated with nausea but frequently with the photophobia and phonophobia. Migraine   This is a chronic problem. The current episode started more than 1 year ago. The problem occurs constantly. The problem has been gradually worsening (Somewhat worse since he is off medications). The pain is located in the bilateral, occipital, frontal, parietal, temporal and retro-orbital region. The pain quality is similar to prior headaches. The quality of the pain is described as aching, throbbing, shooting, boring and squeezing. The pain is at a severity of 4/10 (2-8). The pain is moderate (Moderate to severe). Associated symptoms include phonophobia and photophobia. Pertinent negatives include no abdominal pain, back pain, blurred vision, coughing, eye pain, eye redness, eye watering, fever, hearing loss, nausea, neck pain, numbness, sore throat, tingling, tinnitus, vomiting or weakness. The symptoms are aggravated by bright light and noise.  He has tried acetaminophen, darkened room, oral narcotics, antidepressants, beta blockers, triptans and NSAIDs (CGRP antagonists, anticonvulsants) for the symptoms. The treatment provided no relief. Alleviating factors:ice, rest and dark room  Lifestyle changes experienced with pain: Wakes from sleep, Prevents or limits ADLs  Mood changes,none  Patient currently disability. Physical therapy did not help the pain. Are you under psychological counseling at present: No  Goals for treatment include:  Decrease in pain  Enjoy daily and recreational activities, return to previous status. Last procedure was      Patient relates current medications are helping the pain. Patient reports taking pain medications as prescribed, denies obtaining medications from different sources and denies use of illegal drugs. Patient denies side effects from medications like nausea, vomiting, constipation or drowsiness. Patient reports current activities of daily living ar possible due to medications and would like to continue them.        ACTIVITY/SOCIAL/EMOTIONAL:  Sleep Pattern: 7 hours per night. nightime awakenings and generally restful sleep  Home Exercises:  walking and yoga  Activity:unchanged  Emotional Issues: Depression  Currently seeing a Psychiatrist or Psychologist:  No     ADVERSE MEDICATION EFFECTS:   Nausea and vomiting: no   Constipation: no-Undercontrol-: yes  Dizziness/drowsy/sleepy--no  Urinary Retention: no    ABERRANT BEHAVIORS SINCE LAST VISIT  Lost rx/pills:------------------------------------------ no  Taking  medication as prescribed: ----------- yes  Urine Drug Screen ---------------------------------  yes             Date------------------------------------------------ 6/19/2021              Results as expected ---------------------yes    Recent ER visits: -------------------------------------No  Pill count is appropriate: ---------------------------yes   Refills for prescriptions appropriate:---------- yes      Past Medical History:   Diagnosis Date    Asthma     Depression     Headache(784.0)     Klinefelter syndrome        Past Surgical History:   Procedure Laterality Date    UPPP UVULOPALATOPHARYGOPLASTY         Family History   Problem Relation Age of Onset    High Blood Pressure Father     Mental Illness Sister     High Blood Pressure Paternal Grandmother     Cancer Paternal Grandmother     Heart Disease Paternal Grandfather     High Blood Pressure Paternal Grandfather     Stroke Paternal Grandfather        Social History     Socioeconomic History    Marital status: Single     Spouse name: Not on file    Number of children: Not on file    Years of education: Not on file    Highest education level: Not on file   Occupational History    Occupation: disability   Tobacco Use    Smoking status: Never Smoker    Smokeless tobacco: Never Used   Vaping Use    Vaping Use: Never used   Substance and Sexual Activity    Alcohol use: Yes     Comment: rare , hard cider or wine    Drug use: No    Sexual activity: Never   Other Topics Concern    Not on file   Social History Narrative    Not on file     Social Determinants of Health     Financial Resource Strain:     Difficulty of Paying Living Expenses:    Food Insecurity:     Worried About Running Out of Food in the Last Year:     Ran Out of Food in the Last Year:    Transportation Needs:     Lack of Transportation (Medical):  Lack of Transportation (Non-Medical):    Physical Activity:     Days of Exercise per Week:     Minutes of Exercise per Session:    Stress:     Feeling of Stress :    Social Connections:     Frequency of Communication with Friends and Family:     Frequency of Social Gatherings with Friends and Family:     Attends [employer] Services:     Active Member of Clubs or Organizations:     Attends Club or Organization Meetings:     Marital Status:    Intimate Partner Violence:     Fear of Current or Ex-Partner:     Emotionally Abused:     Physically Abused:     Sexually Abused:         Allergies   Allergen Reactions    Food      Bananas, eggs, milk, cherries    Clams   Upset stomach    Seasonal      Hay fever      Shellfish-Derived Products      Stomach issues       Current Outpatient Medications on File Prior to Encounter   Medication Sig Dispense Refill    B-D 3CC LUER-BRANDI SYR 78GZ9-3/2 22G X 1-1/2\" 3 ML MISC USE AS DIRECTED WITH TESTOSTERONE      divalproex (DEPAKOTE ER) 250 MG extended release tablet TAKE ONE TABLET BY MOUTH TWICE DAILY      testosterone cypionate (DEPOTESTOTERONE CYPIONATE) 200 MG/ML injection INJECT 1.25 MILLILITER INTRAMUSCULARLY EVERY 2 WEEKS      anastrozole (ARIMIDEX) 1 MG tablet Take 1 mg by mouth daily takes 1/2 tab      DULoxetine (CYMBALTA) 20 MG extended release capsule TAKE 1 CAPSULE BY MOUTH ONCE A DAY  2    nadolol (CORGARD) 40 MG tablet 1/4-1/2 AS DIRECTED FOR TREMOR  5    VENTOLIN  (90 Base) MCG/ACT inhaler TWO SPRAYS BY MOUTH FOUR TIMES A DAY AS NEEDED  5    loratadine-pseudoephedrine (CLARITIN-D 12 HOUR) 5-120 MG per extended release tablet Take 1 tablet by mouth 2 times daily      baclofen (LIORESAL) 10 MG tablet Take 10 mg by mouth 3 times daily      cyclobenzaprine (FLEXERIL) 10 MG tablet        No current facility-administered medications on file prior to encounter. Review of Systems   Constitutional: Negative for activity change, appetite change and fever. HENT: Negative for congestion, hearing loss, sore throat and tinnitus. Eyes: Positive for photophobia. Negative for blurred vision, pain and redness. Respiratory: Negative for cough and shortness of breath. Cardiovascular: Negative for chest pain and palpitations. Gastrointestinal: Negative for abdominal pain, constipation, nausea and vomiting. Endocrine: Negative for cold intolerance and polyuria. Genitourinary: Negative for dysuria and hematuria. Musculoskeletal: Negative for back pain and neck pain. Neurological: Negative for tingling, weakness and numbness.    Psychiatric/Behavioral: Negative for hallucinations and suicidal ideas. The patient is not nervous/anxious. Physical Exam  Skin:         Neurological:      Mental Status: He is alert and oriented to person, place, and time. Psychiatric:         Mood and Affect: Mood normal.        Ortho Exam     DATA:  LAB.:  6/19/2021 10:13 AM - Bull, Kash Incoming Lab Results From [facility]    Component Value Ref Range & Units Status Collected Lab   Pain Management Drug Panel Interp, Urine Inconsistent   Final 06/15/2021  1:00 PM [facility]   (NOTE)   ________________________________________________________________   DRUGS EXPECTED:   NUCYNTA (TAPENTADOL) [TODAY]   BUTRANS (BUPRENORPHINE)   ________________________________________________________________   CONSISTENT with medications provided:   Robinson Pruett (TAPENTADOL) : based on tapentadol, tapentadol-o-sulfate   ________________________________________________________________   INCONSISTENT with medications provided:   BUTRANS (BUPRENORPHINE): based on the absence of buprenorphine and   metabolites        X-Ray reports:  Cervical Spine:  9/21/2016. Reason for study: Chronic migraines and neck pain  Comparison: None. Technique: 8 images of the cervical spine were obtained. Findings: There is normal alignment without significant alignment shift upon flexion. There is no acute fracture or subluxation. The dens is intact. Oblique views demonstrate the neural foramen to be widely patent bilaterally  The vertebral bodies demonstrate normal height. The intervertebral disc spaces are well maintained. There is no prevertebral soft tissue swelling. Impression: Unremarkable cervical spine radiographs. Final report electronically signed by Remonia Sicard, M.D. on 9/21/2016     Clinical  impression:  1. Intractable persistent migraine aura without cerebral infarction and without status migrainosus    2. Encounter for long-term opiate analgesic use    3. Cervical spondylosis without myelopathy    4. Cervicalgia    5.  Generalized headaches 6. Bilateral headaches    7. Intractable migraine without status migrainosus, unspecified migraine type    8. Klinefelter syndrome        Plan of care: We will continue current pain medications  Current medications are being tolerated without any Adverse side effects. Orders Placed This Encounter   Medications    tapentadol (NUCYNTA) 50 MG TABS     Sig: Take 1 tablet by mouth every 8 hours for 30 days. Dispense:  90 tablet     Refill:  0     Reduce doses taken as pain becomes manageable    BUTRANS 5 MCG/HR PTWK     Sig: Place 1 patch onto the skin once a week for 28 days. Dispense:  4 patch     Refill:  0     Fill on 9-8-2021     Urine drug screens have been appropriate. No aberrant activity noted. Analgesia is achieved. Activities of daily living are possible because of medications. Safe use of medications explained to patient. PDMP Monitoring:    Last PDMP Lois Augustin as Reviewed [facility]):  Review User Review Instant Review Result   Gerardo Carrera 9/14/2021 11:40 AM Reviewed PDMP [1]     Counselling/Preventive measures for pain  Control:    [x]  Spine strengthening exercises are discussed with patient in detail. [x] Ill effects of being on chronic pain medications such as sleep disturbances, hormonal changes, withdrawal symptoms,  chronic opioid dependence and tolerance were discussed with patient. I had asked the patient to minimize medication use and utilize pain medications only for uncontrolled rest pain or pain with exertional activities. I advised patient not to self escalate pain medications without consulting with us. At each of patient's future visits we will try to taper pain medications, while adjusting the adjunct medications, and re-evaluating for Physical Therapy to improve spinal and joint strength. We will continue to have discussions to decrease pain medications as tolerated.    I also discussed with the patient regarding the dangers of combining narcotic pain medication with tranquilizers, alcohol or illegal drugs or taking the medication any other than prescribed. The dangers including the respiratory depression and death. Patient was told to tell  to all  physicians regarding the medications he is getting from pain clinic. Patient is warned not to take any unprescribed medications over-the-counter medications that can depress breathing . Patient is advised to talk to the pharmacist or physicians if planning to take any over-the-counter medications before  takeing them. Patient is strongly advised to avoid tranquilizers or  Relaxants for any medications that can depress breathing or recreational drugs. Patient is also advised to tell us if there is any changes in his medications from other physicians. We discussed the same at today's visit and have not been to implement it, as the patient's pain is not under control with current medications. Decision Making Process : Patient's health history and referral records thoroughly reviewed before focused physical examination and discussion with patient. Over 50% of today's visit is spent on examining the patient and counseling and coordinating the care. Level of complexity of date to be reviewed is Moderate. The chart date reviewed include the following: Imaging Reports. Summary of Care. Time spent reviewing with patient the below reports:   Medication safety, Treatment options. Level of diagnosis and management options of this case is multiple: involving the following management options: Interventions as needed, medication management as appropriate, future visits, activity modification, heat/ice as needed, Urine drug screen as required. [x]The patient's questions were answered to the best of my abilities. This note was created using voice recognition software. There may be inaccuracies of transcription  that are inadvertently overlooked prior to the signature.   There is any questions about the transcription please contact me. Return in  4 weeks  with physician / CNP  for further plan of treatment. Due to the COVID-19 pandemic and the appropriate interventions by Savannah Williamson, our non-urgent pain management patients will not be seen in the office at this time for their protection and the protection of our staff. To offer continuity of care, their prescriptions will be escribed this month after a careful chart review and review of their OARRS report  Pursuant to the emergency declaration under the [employer] and [facility] [place], operated by [facility], 113 waiver authority and the [employer] and [employer] Act, this Virtual Visit was conducted, with patient's consent, to reduce the patient's risk of exposure to COVID-19 and provide continuity of care for an established patient. Services were provided through a video synchronous discussion virtually to substitute for in-person appointment. \"  Documentation:  I communicated with the patient and/or health care decision maker about plan of care  Details of this discussion including any medical advice provided: Total Time: minutes: 21-30 minutes    I affirm this is a Patient Initiated Episode with an Established Patient who has not had a related appointment within my department in the past 7 days or scheduled within the next 24 hours.     Electronically signed by María Browne MD on 10/14/2021 at 9:31 PM

## 2021-11-11 ENCOUNTER — HOSPITAL ENCOUNTER (OUTPATIENT)
Dept: PAIN MANAGEMENT | Age: 36
Discharge: HOME | End: 2021-11-11
Payer: MEDICAID

## 2021-11-11 DIAGNOSIS — G43.919 INTRACTABLE MIGRAINE WITHOUT STATUS MIGRAINOSUS, UNSPECIFIED MIGRAINE TYPE: ICD-10-CM

## 2021-11-11 DIAGNOSIS — G43.519 INTRACTABLE PERSISTENT MIGRAINE AURA WITHOUT CEREBRAL INFARCTION AND WITHOUT STATUS MIGRAINOSUS: ICD-10-CM

## 2021-11-11 DIAGNOSIS — M54.2 CERVICALGIA: ICD-10-CM

## 2021-11-11 DIAGNOSIS — R51.9 BILATERAL HEADACHES: Primary | ICD-10-CM

## 2021-11-11 DIAGNOSIS — Z79.891 ENCOUNTER FOR LONG-TERM OPIATE ANALGESIC USE: ICD-10-CM

## 2021-11-11 DIAGNOSIS — Q98.4 KLINEFELTER SYNDROME: ICD-10-CM

## 2021-11-11 DIAGNOSIS — R51.9 GENERALIZED HEADACHES: ICD-10-CM

## 2021-11-11 DIAGNOSIS — M47.812 CERVICAL SPONDYLOSIS WITHOUT MYELOPATHY: ICD-10-CM

## 2021-11-11 PROCEDURE — 99212 OFFICE O/P EST SF 10 MIN: CPT | Performed by: NURSE PRACTITIONER

## 2021-11-11 PROCEDURE — 99213 OFFICE O/P EST LOW 20 MIN: CPT

## 2021-11-11 RX ORDER — TAPENTADOL HYDROCHLORIDE 50 MG/1
50 TABLET, FILM COATED ORAL EVERY 8 HOURS
Qty: 90 TABLET | Refills: 0 | Status: SHIPPED | OUTPATIENT
Start: 2021-11-17 | End: 2021-12-10 | Stop reason: SDUPTHER

## 2021-11-11 RX ORDER — BUPRENORPHINE 5 UG/H
1 PATCH, EXTENDED RELEASE TRANSDERMAL WEEKLY
Qty: 4 PATCH | Refills: 0 | Status: SHIPPED | OUTPATIENT
Start: 2021-11-11 | End: 2021-12-21 | Stop reason: SDUPTHER

## 2021-11-11 ASSESSMENT — ENCOUNTER SYMPTOMS
SHORTNESS OF BREATH: 1
GASTROINTESTINAL NEGATIVE: 1
BACK PAIN: 1

## 2021-11-11 NOTE — PROGRESS NOTES
[facility] PROGRESS NOTE      Patient  completed []  video visit   [x]   phone call:   7      Minutes :       [x]    to  review Medication Agreement    []  Follow up after procedure   []  Discuss treatment options      Location:  Provider:  working from    [x]    home    []   [facility] - [facility] ,   patient at home       Chief Complaint: headache    He c/o migraine headache which has not changed. He reports he gets an aura sometimes before the start of a migraine. His headaches are similar to previous headaches, He has a long standing history of migraines, since he was a teenager. He has been to a headache clinic in the past and tried accupuncture, He is on Butrans patch and nucynta,which helps. He has been doing yoga and some walking. Migraine   This is a chronic problem. The current episode started more than 1 year ago. The problem occurs constantly. The problem has been unchanged. The pain is located in the left unilateral region. The pain does not radiate. The pain quality is similar to prior headaches. The quality of the pain is described as aching and throbbing. The pain is at a severity of 5/10. The pain is moderate. Associated symptoms include back pain. The symptoms are aggravated by noise. He has tried darkened room, cold packs and oral narcotics for the symptoms. His past medical history is significant for migraines in the family.        Treatment goals:  Functional status:  Make pain more manageale      Aberrancy:   Any alcoholic beverages  no          Any illegal drugs   no      Analgesia:        5             Adverse  Effects :some itching,  Some drowsiness      ADL;s : yoga, walking    Data:    When was thelast UDS:            Was the UDS appropriate:  [] yes []   no      Record/Diagnostics Review:      As above, I did review the imaging       DRUG SCREEN, PAIN  Order: [id number]   Status: Final result     Visible to patient: Yes (seen)     Next appt: 12/10/2021 at 01:40 PM in Pain Management Chika OCHOA, APRN - CNP)     Dx: Bilateral headaches; Encounter for lo. ..     1 Result Note     Ref Range & Units 6/15/21 1300 Resulting Agency   Pain Management Drug Panel Interp, Urine  Inconsistent  [facility]   Comment: (NOTE)   ________________________________________________________________   DRUGS EXPECTED:   NUCYNTA (TAPENTADOL) [TODAY]   BUTRANS (BUPRENORPHINE)   ________________________________________________________________   CONSISTENT with medications provided:   NUCYNTA (TAPENTADOL) : based on tapentadol, tapentadol-o-sulfate   ________________________________________________________________   INCONSISTENT with medications provided:   BUTRANS (BUPRENORPHINE): based on the absence of buprenorphine and   metabolites   ________________________________________________________________   INTERPRETIVE INFORMATION: Targeted drug profile Interp   Interpretation depends on accuracy and completeness of patient   medication information submitted by client. 6-Acetylmorphine, Ur  Not Detected             Cervical Spine:  9/21/2016.       Reason for study: Chronic migraines and neck pain        Comparison: None.       Technique: 8 images of the cervical spine were obtained.        Findings:  There is normal alignment without significant alignment shift upon flexion.  There is no acute fracture or subluxation.  The dens is intact.  Oblique views demonstrate the neural foramen to be widely patent bilaterally       The vertebral bodies demonstrate normal height.  The intervertebral disc spaces are well maintained.  There is no prevertebral soft tissue swelling.       Impression: Unremarkable cervical spine radiographs.       Final report electronically signed by Remonia Sicard, M.D. on 9/21/2016 4:21 PM                       Pill count: appropriate    fill date : 11- for nucynta and 11- for butrans patch    Morphine equivalent dose as reported on OARRS: 60.12  Periodic Controlled Substance 12 HOUR) 5-120 MG per extended release tablet, Take 1 tablet by mouth 2 times daily, Disp: , Rfl:     baclofen (LIORESAL) 10 MG tablet, Take 10 mg by mouth 3 times daily, Disp: , Rfl:     cyclobenzaprine (FLEXERIL) 10 MG tablet, , Disp: , Rfl:     Family History   Problem Relation Age of Onset    High Blood Pressure Father     Mental Illness Sister     High Blood Pressure Paternal Grandmother     Cancer Paternal Grandmother     Heart Disease Paternal Grandfather     High Blood Pressure Paternal Grandfather     Stroke Paternal Grandfather        Social History     Socioeconomic History    Marital status: Single     Spouse name: Not on file    Number of children: Not on file    Years of education: Not on file    Highest education level: Not on file   Occupational History    Occupation: disability   Tobacco Use    Smoking status: Never Smoker    Smokeless tobacco: Never Used   Vaping Use    Vaping Use: Never used   Substance and Sexual Activity    Alcohol use: Yes     Comment: rare , hard cider or wine    Drug use: No    Sexual activity: Never   Other Topics Concern    Not on file   Social History Narrative    Not on file     Social Determinants of Health     Financial Resource Strain:     Difficulty of Paying Living Expenses: Not on file   Food Insecurity:     Worried About Running Out of Food in the Last Year: Not on file    [place] of Food in the Last Year: Not on file   Transportation Needs:     Lack of Transportation (Medical): Not on file    Lack of Transportation (Non-Medical):  Not on file   Physical Activity:     Days of Exercise per Week: Not on file    Minutes of Exercise per Session: Not on file   Stress:     Feeling of Stress : Not on file   Social Connections:     Frequency of Communication with Friends and Family: Not on file    Frequency of Social Gatherings with Friends and Family: Not on file    Attends Restorationist Services: Not on file   [employer] or Organizations: Not on file    Attends Club or Organization Meetings: Not on file    Marital Status: Not on file   Intimate Partner Violence:     Fear of Current or Ex-Partner: Not on file    Emotionally Abused: Not on file    Physically Abused: Not on file    Sexually Abused: Not on file   Housing Stability:     Unable to Pay for Housing in the Last Year: Not on file    Number of [place] in the Last Year: Not on file    Unstable Housing in the Last Year: Not on file         Review of Systems:  Review of Systems   Constitutional: Negative. HENT: Negative. Cardiovascular: Negative. Respiratory: Positive for shortness of breath. Endocrine: Negative. Hematologic/Lymphatic: Negative. Skin: Negative. Musculoskeletal: Positive for back pain and joint pain. Gastrointestinal: Negative. Genitourinary: Negative. Neurological: Positive for headaches. Psychiatric/Behavioral: Positive for depression. Managing         Physical Exam:  There were no vitals taken for this visit. Physical Exam  HENT:      Head:     Neurological:      Mental Status: He is alert and oriented to person, place, and time. Psychiatric:         Mood and Affect: Mood normal.         Thought Content: Thought content normal.           Assessment:    Problem List Items Addressed This Visit     Klinefelter syndrome    Intractable migraine without status migrainosus    Encounter for long-term opiate analgesic use    Cervicalgia    Cervical spondylosis without myelopathy    Bilateral headaches - Primary            Treatment Plan:  DISCUSSION: Treatment options discussed withpatient and all questions answered to patient's satisfaction.      Possible side effects, risk of tolerance and or dependence and alternative treatments discussed    Obtaining appropriate analgesic effect of treatment   No signs of potential drug abuse or diversion identified    [x] Ill effects of being on chronic pain medications such as sleep disturbances, respiratory depression, hormonal changes, withdrawal symptoms, chronic opioid dependence and tolerance as well as risk of taking opioids with Benzodiazepines and taking opioids along with alcohol,  werediscussed with patient. I had asked the patient to minimize medication use and utilize pain medications only for uncontrolled rest pain or pain with exertional activities. I advised patient not to self-escalate painmedications without consulting with us. At each of patient's future visits we will try to taper pain medications, while adjusting the adjunct medications, and re-evaluating for Physical Therapy to improve spinal andjoint strength. We will continue to have discussions to decrease pain medications as tolerated. Counseled patient on effects their pain medication and /or their medical condition mayhave on their  ability to drive or operate machinery. Instructed not to drive or operate machinery if drowsy     I also discussed with the patient regarding the dangers of combining narcotic pain medication with tranquilizers, alcohol or illegal drugs or taking the medication any way other than prescribed. The dangers were discussed  including respiratory depression and death. Patient was told to tell  all  physicians regarding the medications he is getting from pain clinic. Patient is warned not to take any unprescribed medications over-the-countermedications that can depress breathing . Patient is advised to talk to the pharmacist or physicians if planning to take any over-the-counter medications before  takeing them. Patient is strongly advised to avoid tranquilizers or  relaxants, illegal drugs  or any medications that can depress breathing  Patient is also advised to tell us if there is any changes in their medications from other physicians.             TREATMENT OPTIONS:       Medication Agreement Requirements Met  Continue Opioid therapy  Script written for  Aston Callahan patch  Follow up appointment made

## 2021-12-10 ENCOUNTER — HOSPITAL ENCOUNTER (OUTPATIENT)
Dept: PAIN MANAGEMENT | Age: 36
Discharge: HOME | End: 2021-12-10
Payer: MEDICAID

## 2021-12-10 DIAGNOSIS — G43.519 INTRACTABLE PERSISTENT MIGRAINE AURA WITHOUT CEREBRAL INFARCTION AND WITHOUT STATUS MIGRAINOSUS: ICD-10-CM

## 2021-12-10 DIAGNOSIS — M47.812 CERVICAL SPONDYLOSIS WITHOUT MYELOPATHY: ICD-10-CM

## 2021-12-10 DIAGNOSIS — G43.919 INTRACTABLE MIGRAINE WITHOUT STATUS MIGRAINOSUS, UNSPECIFIED MIGRAINE TYPE: ICD-10-CM

## 2021-12-10 DIAGNOSIS — R51.9 BILATERAL HEADACHES: Primary | ICD-10-CM

## 2021-12-10 DIAGNOSIS — Z79.891 ENCOUNTER FOR LONG-TERM OPIATE ANALGESIC USE: ICD-10-CM

## 2021-12-10 DIAGNOSIS — R51.9 GENERALIZED HEADACHES: ICD-10-CM

## 2021-12-10 DIAGNOSIS — M54.2 CERVICALGIA: ICD-10-CM

## 2021-12-10 PROCEDURE — 99212 OFFICE O/P EST SF 10 MIN: CPT | Performed by: NURSE PRACTITIONER

## 2021-12-10 PROCEDURE — 99213 OFFICE O/P EST LOW 20 MIN: CPT

## 2021-12-10 RX ORDER — TAPENTADOL HYDROCHLORIDE 50 MG/1
50 TABLET, FILM COATED ORAL EVERY 8 HOURS
Qty: 90 TABLET | Refills: 0 | Status: SHIPPED | OUTPATIENT
Start: 2021-12-17 | End: 2022-01-11 | Stop reason: SDUPTHER

## 2021-12-10 ASSESSMENT — ENCOUNTER SYMPTOMS
GASTROINTESTINAL NEGATIVE: 1
SHORTNESS OF BREATH: 1
EYES NEGATIVE: 1

## 2021-12-10 NOTE — PROGRESS NOTES
________________________________________________________________   CONSISTENT with medications provided:   NUCYNTA (TAPENTADOL) : based on tapentadol, tapentadol-o-sulfate   ________________________________________________________________   INCONSISTENT with medications provided:   BUTRANS (BUPRENORPHINE): based on the absence of buprenorphine and   metabolites   ________________________________________________________________   INTERPRETIVE INFORMATION: Targeted drug profile Interp   Interpretation depends on accuracy and completeness of patient   medication information submitted by client. Pill count: appropriate    fill date : 12- for nucynta    Morphine equivalent dose as reported on OARRS: 60.12  Periodic Controlled Substance Monitoring: Possible medication side effects, risk of tolerance/dependence & alternative treatments discussed., No signs of potential drug abuse or diversion identified. , Assessed functional status., Obtaining appropriate analgesic effect of treatment. Nallely Vance, APRN - CNP)  Review ofOARRS does not show any aberrant prescription behavior. Medication is helping the patient stay active. Patient denies any side effects and reports adequate analgesia. No sign of misuse/abuse. Past Medical History:   Diagnosis Date    Asthma     Depression     Headache(784.0)     Klinefelter syndrome        Past Surgical History:   Procedure Laterality Date    UPPP UVULOPALATOPHARYGOPLASTY         Allergies   Allergen Reactions    Food      Bananas, eggs, milk, cherries    Clams   Upset stomach    Seasonal      Hay fever      Shellfish-Derived Products      Stomach issues         Current Outpatient Medications:     tapentadol (NUCYNTA) 50 MG TABS, Take 1 tablet by mouth every 8 hours for 30 days. , Disp: 90 tablet, Rfl: 0    B-D 3CC LUER-BRANDI SYR 59KY7-2/2 22G X 1-1/2\" 3 ML MISC, USE AS DIRECTED WITH TESTOSTERONE, Disp: , Rfl:     divalproex (DEPAKOTE ER) 250 MG extended release tablet, TAKE ONE TABLET BY MOUTH TWICE DAILY, Disp: , Rfl:     testosterone cypionate (DEPOTESTOTERONE CYPIONATE) 200 MG/ML injection, INJECT 1.25 MILLILITER INTRAMUSCULARLY EVERY 2 WEEKS, Disp: , Rfl:     anastrozole (ARIMIDEX) 1 MG tablet, Take 1 mg by mouth daily takes 1/2 tab, Disp: , Rfl:     DULoxetine (CYMBALTA) 20 MG extended release capsule, TAKE 1 CAPSULE BY MOUTH ONCE A DAY, Disp: , Rfl: 2    nadolol (CORGARD) 40 MG tablet, 1/4-1/2 AS DIRECTED FOR TREMOR, Disp: , Rfl: 5    VENTOLIN  (90 Base) MCG/ACT inhaler, TWO SPRAYS BY MOUTH FOUR TIMES A DAY AS NEEDED, Disp: , Rfl: 5    loratadine-pseudoephedrine (CLARITIN-D 12 HOUR) 5-120 MG per extended release tablet, Take 1 tablet by mouth 2 times daily, Disp: , Rfl:     baclofen (LIORESAL) 10 MG tablet, Take 10 mg by mouth 3 times daily, Disp: , Rfl:     cyclobenzaprine (FLEXERIL) 10 MG tablet, , Disp: , Rfl:     Family History   Problem Relation Age of Onset    High Blood Pressure Father     Mental Illness Sister     High Blood Pressure Paternal Grandmother     Cancer Paternal Grandmother     Heart Disease Paternal Grandfather     High Blood Pressure Paternal Grandfather     Stroke Paternal Grandfather        Social History     Socioeconomic History    Marital status: Single     Spouse name: Not on file    Number of children: Not on file    Years of education: Not on file    Highest education level: Not on file   Occupational History    Occupation: disability   Tobacco Use    Smoking status: Never Smoker    Smokeless tobacco: Never Used   Vaping Use    Vaping Use: Never used   Substance and Sexual Activity    Alcohol use: Yes     Comment: rare , hard cider or wine    Drug use: No    Sexual activity: Never   Other Topics Concern    Not on file   Social History Narrative    Not on file     Social Determinants of Health     Financial Resource Strain:     Difficulty of Paying Living Expenses: Not on file Food Insecurity:     Worried About Running Out of Food in the Last Year: Not on file    [place] of Food in the Last Year: Not on file   Transportation Needs:     Lack of Transportation (Medical): Not on file    Lack of Transportation (Non-Medical): Not on file   Physical Activity:     Days of Exercise per Week: Not on file    Minutes of Exercise per Session: Not on file   Stress:     Feeling of Stress : Not on file   Social Connections:     Frequency of Communication with Friends and Family: Not on file    Frequency of Social Gatherings with Friends and Family: Not on file    Attends Restorationist Services: Not on file    Active Member of [address] [place] [address] [employer] or Organizations: Not on file    Attends Club or Organization Meetings: Not on file    Marital Status: Not on file   Intimate Partner Violence:     Fear of Current or Ex-Partner: Not on file    Emotionally Abused: Not on file    Physically Abused: Not on file    Sexually Abused: Not on file   Housing Stability:     Unable to Pay for Housing in the Last Year: Not on file    Number of [place] in the Last Year: Not on file    Unstable Housing in the Last Year: Not on file         Review of Systems:  Review of Systems   Constitutional: Negative. HENT: Negative. Eyes: Negative. Cardiovascular: Negative. Respiratory: Positive for shortness of breath. Endocrine: Negative. Hematologic/Lymphatic: Negative. Skin: Negative. Musculoskeletal: Negative. Gastrointestinal: Negative. Genitourinary: Negative. Neurological: Positive for headaches. Psychiatric/Behavioral: Positive for depression. Managing         Physical Exam:  There were no vitals taken for this visit. Physical Exam  HENT:      Head:     Neurological:      Mental Status: He is alert and oriented to person, place, and time. Psychiatric:         Mood and Affect: Mood normal.         Thought Content:  Thought content normal.           Assessment:    Problem List getting from pain clinic. Patient is warned not to take any unprescribed medications over-the-countermedications that can depress breathing . Patient is advised to talk to the pharmacist or physicians if planning to take any over-the-counter medications before  takeing them. Patient is strongly advised to avoid tranquilizers or  relaxants, illegal drugs  or any medications that can depress breathing  Patient is also advised to tell us if there is any changes in their medications from other physicians.     1. He was instructed to call  to his refill date for butran patch        TREATMENT OPTIONS:       Medication Agreement Requirements Met  Continue Opioid therapy  Script written for  nucynta  Follow up appointment made

## 2021-12-21 DIAGNOSIS — M47.812 CERVICAL SPONDYLOSIS WITHOUT MYELOPATHY: ICD-10-CM

## 2021-12-21 DIAGNOSIS — Q98.4 KLINEFELTER SYNDROME: ICD-10-CM

## 2021-12-21 DIAGNOSIS — M54.2 CERVICALGIA: ICD-10-CM

## 2021-12-21 DIAGNOSIS — R51.9 GENERALIZED HEADACHES: ICD-10-CM

## 2021-12-21 DIAGNOSIS — R51.9 BILATERAL HEADACHES: ICD-10-CM

## 2021-12-21 RX ORDER — BUPRENORPHINE 5 UG/H
1 PATCH, EXTENDED RELEASE TRANSDERMAL WEEKLY
Qty: 4 PATCH | Refills: 0 | Status: SHIPPED | OUTPATIENT
Start: 2021-12-21 | End: 2022-02-11 | Stop reason: SDUPTHER

## 2022-01-10 NOTE — PROGRESS NOTES
[facility] [address] PROGRESS NOTE      Patient  completed []  video visit   []   phone call:         Minutes :   [x] in person visit to pain clinic    [x]    to  review Medication Agreement    []  Follow up after procedure   []  Discuss treatment options      Location:  Provider:  working from    []    home    []   [facility] - [place] [facility] ,   patient at   [x] pain clinic     []  home         Chief Complaint: headache. He c/o headache, Right temporal area. He has long standing history of migraines since he was a teenager. His headaches are unchanged. He uses  A butarns 5 mcg patch, 2 weeks on and 2 weeks off. He is on  nucynta prn. Prior treatments included  acupressure which was in [facility] several years ( 15) years ago. Chiropractic treatment a few different Chiropractor visits, with neck adjustments x 15 years. He was previously  seen at [facility] in [facility]. He syaytes is doing yoga      Migraine   This is a chronic problem. The current episode started more than 1 year ago. The problem occurs daily. The problem has been unchanged. The pain is located in the right unilateral region. The pain quality is similar to prior headaches. The quality of the pain is described as throbbing. The pain is at a severity of 6/10. The pain is moderate. Associated symptoms include back pain, phonophobia and photophobia. The symptoms are aggravated by noise. He has tried cold packs, darkened room and oral narcotics for the symptoms. The treatment provided mild relief.          Treatment goals:  Functional status: be more active      Aberrancy:   Any alcoholic beverages    no        Any illegal drugs   no      Analgesia:    6                 Adverse  Effects :nucynta, itching      ADL;s :doing yoga      Data:    When was thelast UDS:   6-         Was the UDS appropriate:  [] yes []   no  Butrans did not show up  Record/Diagnostics Review:      As above, I did review the fill date :1=17-22 nucynta # 18 pills and  Butrans patch # 2 patches left    Morphine equivalent dose as reported on OARRS:60.12  Periodic Controlled Substance Monitoring: No signs of potential drug abuse or diversion identified. ,Possible medication side effects, risk of tolerance/dependence & alternative treatments discussed. ,Assessed functional status. ,Obtaining appropriate analgesic effect of treatment. Keila Johnson, APRN - CNP)  Review ofOARRS does not show any aberrant prescription behavior. Medication is helping the patient stay active. Patient denies any side effects and reports adequate analgesia. No sign of misuse/abuse. Past Medical History:   Diagnosis Date    Asthma     Depression     Headache(784.0)     Klinefelter syndrome        Past Surgical History:   Procedure Laterality Date    UPPP UVULOPALATOPHARYGOPLASTY         Allergies   Allergen Reactions    Food      Bananas, eggs, milk, cherries    Clams   Upset stomach    Seasonal      Hay fever      Shellfish-Derived Products      Stomach issues         Current Outpatient Medications:     [START ON 1/17/2022] tapentadol (NUCYNTA) 50 MG TABS, Take 1 tablet by mouth every 8 hours for 30 days. , Disp: 90 tablet, Rfl: 0    BUTRANS 5 MCG/HR PTWK, Place 1 patch onto the skin once a week for 28 days. , Disp: 4 patch, Rfl: 0    divalproex (DEPAKOTE ER) 250 MG extended release tablet, TAKE ONE TABLET BY MOUTH TWICE DAILY, Disp: , Rfl:     anastrozole (ARIMIDEX) 1 MG tablet, Take 1 mg by mouth daily takes 1/2 tab, Disp: , Rfl:     DULoxetine (CYMBALTA) 20 MG extended release capsule, TAKE 1 CAPSULE BY MOUTH ONCE A DAY, Disp: , Rfl: 2    loratadine-pseudoephedrine (CLARITIN-D 12 HOUR) 5-120 MG per extended release tablet, Take 1 tablet by mouth 2 times daily, Disp: , Rfl:     B-D 3CC LUER-BRANDI SYR 13HQ3-0/2 22G X 1-1/2\" 3 ML MISC, USE AS DIRECTED WITH TESTOSTERONE, Disp: , Rfl:     testosterone cypionate (DEPOTESTOTERONE CYPIONATE) 200 MG/ML injection, INJECT 1.25 MILLILITER INTRAMUSCULARLY EVERY 2 WEEKS, Disp: , Rfl:     nadolol (CORGARD) 40 MG tablet, 1/4-1/2 AS DIRECTED FOR TREMOR, Disp: , Rfl: 5    VENTOLIN  (90 Base) MCG/ACT inhaler, TWO SPRAYS BY MOUTH FOUR TIMES A DAY AS NEEDED, Disp: , Rfl: 5    baclofen (LIORESAL) 10 MG tablet, Take 10 mg by mouth 3 times daily, Disp: , Rfl:     cyclobenzaprine (FLEXERIL) 10 MG tablet, , Disp: , Rfl:     Family History   Problem Relation Age of Onset    High Blood Pressure Father     Mental Illness Sister     High Blood Pressure Paternal Grandmother     Cancer Paternal Grandmother     Heart Disease Paternal Grandfather     High Blood Pressure Paternal Grandfather     Stroke Paternal Grandfather        Social History     Socioeconomic History    Marital status: Single     Spouse name: Not on file    Number of children: Not on file    Years of education: Not on file    Highest education level: Not on file   Occupational History    Occupation: disability   Tobacco Use    Smoking status: Never Smoker    Smokeless tobacco: Never Used   Vaping Use    Vaping Use: Never used   Substance and Sexual Activity    Alcohol use: Yes     Comment: rare , hard cider or wine    Drug use: No    Sexual activity: Never   Other Topics Concern    Not on file   Social History Narrative    Not on file     Social Determinants of Health     Financial Resource Strain:     Difficulty of Paying Living Expenses: Not on file   Food Insecurity:     Worried About Running Out of Food in the Last Year: Not on file    Lencho of Food in the Last Year: Not on file   Transportation Needs:     Lack of Transportation (Medical): Not on file    Lack of Transportation (Non-Medical):  Not on file   Physical Activity:     Days of Exercise per Week: Not on file    Minutes of Exercise per Session: Not on file   Stress:     Feeling of Stress : Not on file   Social Connections:     Frequency of Communication with Friends and Family: Not on file    Frequency of Social Gatherings with Friends and Family: Not on file    Attends Religion Services: Not on file    Active Member of Clubs or Organizations: Not on file    Attends Club or Organization Meetings: Not on file    Marital Status: Not on file   Intimate Partner Violence:     Fear of Current or Ex-Partner: Not on file    Emotionally Abused: Not on file    Physically Abused: Not on file    Sexually Abused: Not on file   Housing Stability:     Unable to Pay for Housing in the Last Year: Not on file    Number of [place] in the Last Year: Not on file    Unstable Housing in the Last Year: Not on file         Review of Systems:  Review of Systems   Constitutional: Negative. HENT: Negative. Eyes: Positive for photophobia. Cardiovascular: Negative. Respiratory: Positive for shortness of breath. Endocrine: Negative. Skin: Negative. Musculoskeletal: Positive for back pain. Gastrointestinal: Negative. Genitourinary: Negative. Neurological: Positive for headaches. Psychiatric/Behavioral: Positive for depression. Managing         Physical Exam:  BP (!) 150/88   Pulse 91   Temp 96.5 °F (35.8 °C) (Skin)   Resp 18   SpO2 96%     Physical Exam  HENT:      Head: Normocephalic. Eyes:      Comments: Perrla   Neurological:      Mental Status: He is alert and oriented to person, place, and time. Psychiatric:         Mood and Affect: Mood normal.         Thought Content:  Thought content normal.           Assessment:    Problem List Items Addressed This Visit     Intractable migraine without status migrainosus    Relevant Medications    tapentadol (NUCYNTA) 50 MG TABS (Start on 1/17/2022)    Generalized headaches    Relevant Medications    tapentadol (NUCYNTA) 50 MG TABS (Start on 1/17/2022)    Encounter for long-term opiate analgesic use    Relevant Medications    tapentadol (NUCYNTA) 50 MG TABS (Start on 1/17/2022)    Cervicalgia Relevant Medications    tapentadol (NUCYNTA) 50 MG TABS (Start on 1/17/2022)    Cervical spondylosis without myelopathy    Relevant Medications    tapentadol (NUCYNTA) 50 MG TABS (Start on 1/17/2022)    Bilateral headaches - Primary    Relevant Medications    tapentadol (NUCYNTA) 50 MG TABS (Start on 1/17/2022)              Treatment Plan:  DISCUSSION: Treatment options discussed withpatient and all questions answered to patient's satisfaction. Possible side effects, risk of tolerance and or dependence and alternative treatments discussed    Obtaining appropriate analgesic effect of treatment   No signs of potential drug abuse or diversion identified    [x] Ill effects of being on chronic pain medications such as sleep disturbances, respiratory depression, hormonal changes, withdrawal symptoms, chronic opioid dependence and tolerance as well as risk of taking opioids with Benzodiazepines and taking opioids along with alcohol,  werediscussed with patient. I had asked the patient to minimize medication use and utilize pain medications only for uncontrolled rest pain or pain with exertional activities. I advised patient not to self-escalate painmedications without consulting with us. At each of patient's future visits we will try to taper pain medications, while adjusting the adjunct medications, and re-evaluating for Physical Therapy to improve spinal andjoint strength. We will continue to have discussions to decrease pain medications as tolerated. Counseled patient on effects their pain medication and /or their medical condition mayhave on their  ability to drive or operate machinery. Instructed not to drive or operate machinery if drowsy     I also discussed with the patient regarding the dangers of combining narcotic pain medication with tranquilizers, alcohol or illegal drugs or taking the medication any way other than prescribed. The dangers were discussed  including respiratory depression and death. Patient was told to tell  all  physicians regarding the medications he is getting from pain clinic. Patient is warned not to take any unprescribed medications over-the-countermedications that can depress breathing . Patient is advised to talk to the pharmacist or physicians if planning to take any over-the-counter medications before  takeing them. Patient is strongly advised to avoid tranquilizers or  relaxants, illegal drugs  or any medications that can depress breathing  Patient is also advised to tell us if there is any changes in their medications from other physicians.         Did not need refill on butrans patch, he does 2 weeks on and 2 weeks off    TREATMENT OPTIONS:       Medication Agreement Requirements Met  Continue Opioid therapy  Script written for  nucynta  Follow up appointment made

## 2022-01-11 ENCOUNTER — HOSPITAL ENCOUNTER (OUTPATIENT)
Dept: PAIN MANAGEMENT | Age: 37
Discharge: HOME | End: 2022-01-11
Payer: MEDICAID

## 2022-01-11 VITALS
TEMPERATURE: 96.5 F | RESPIRATION RATE: 18 BRPM | DIASTOLIC BLOOD PRESSURE: 88 MMHG | OXYGEN SATURATION: 96 % | HEART RATE: 91 BPM | SYSTOLIC BLOOD PRESSURE: 150 MMHG

## 2022-01-11 DIAGNOSIS — M47.812 CERVICAL SPONDYLOSIS WITHOUT MYELOPATHY: ICD-10-CM

## 2022-01-11 DIAGNOSIS — M54.2 CERVICALGIA: ICD-10-CM

## 2022-01-11 DIAGNOSIS — Z79.891 ENCOUNTER FOR LONG-TERM OPIATE ANALGESIC USE: ICD-10-CM

## 2022-01-11 DIAGNOSIS — R51.9 BILATERAL HEADACHES: Primary | ICD-10-CM

## 2022-01-11 DIAGNOSIS — G43.519 INTRACTABLE PERSISTENT MIGRAINE AURA WITHOUT CEREBRAL INFARCTION AND WITHOUT STATUS MIGRAINOSUS: ICD-10-CM

## 2022-01-11 DIAGNOSIS — G43.919 INTRACTABLE MIGRAINE WITHOUT STATUS MIGRAINOSUS, UNSPECIFIED MIGRAINE TYPE: ICD-10-CM

## 2022-01-11 DIAGNOSIS — R51.9 GENERALIZED HEADACHES: ICD-10-CM

## 2022-01-11 PROCEDURE — 99213 OFFICE O/P EST LOW 20 MIN: CPT

## 2022-01-11 PROCEDURE — 99213 OFFICE O/P EST LOW 20 MIN: CPT | Performed by: NURSE PRACTITIONER

## 2022-01-11 RX ORDER — TAPENTADOL HYDROCHLORIDE 50 MG/1
50 TABLET, FILM COATED ORAL EVERY 8 HOURS
Qty: 90 TABLET | Refills: 0 | Status: SHIPPED | OUTPATIENT
Start: 2022-01-17 | End: 2022-02-11 | Stop reason: SDUPTHER

## 2022-01-11 ASSESSMENT — ENCOUNTER SYMPTOMS
GASTROINTESTINAL NEGATIVE: 1
BACK PAIN: 1
PHOTOPHOBIA: 1
SHORTNESS OF BREATH: 1

## 2022-01-11 ASSESSMENT — PAIN SCALES - GENERAL: PAINLEVEL_OUTOF10: 6

## 2022-02-11 ENCOUNTER — HOSPITAL ENCOUNTER (OUTPATIENT)
Dept: PAIN MANAGEMENT | Age: 37
Discharge: HOME | End: 2022-02-11
Payer: MEDICAID

## 2022-02-11 VITALS
WEIGHT: 310 LBS | BODY MASS INDEX: 38.54 KG/M2 | DIASTOLIC BLOOD PRESSURE: 108 MMHG | OXYGEN SATURATION: 94 % | TEMPERATURE: 96.2 F | SYSTOLIC BLOOD PRESSURE: 159 MMHG | HEIGHT: 75 IN | HEART RATE: 96 BPM | RESPIRATION RATE: 18 BRPM

## 2022-02-11 DIAGNOSIS — M47.812 CERVICAL SPONDYLOSIS WITHOUT MYELOPATHY: ICD-10-CM

## 2022-02-11 DIAGNOSIS — Z79.891 ENCOUNTER FOR LONG-TERM OPIATE ANALGESIC USE: ICD-10-CM

## 2022-02-11 DIAGNOSIS — M54.2 CERVICALGIA: ICD-10-CM

## 2022-02-11 DIAGNOSIS — G43.919 INTRACTABLE MIGRAINE WITHOUT STATUS MIGRAINOSUS, UNSPECIFIED MIGRAINE TYPE: ICD-10-CM

## 2022-02-11 DIAGNOSIS — R51.9 BILATERAL HEADACHES: Primary | ICD-10-CM

## 2022-02-11 DIAGNOSIS — Q98.4 KLINEFELTER SYNDROME: ICD-10-CM

## 2022-02-11 DIAGNOSIS — G43.519 INTRACTABLE PERSISTENT MIGRAINE AURA WITHOUT CEREBRAL INFARCTION AND WITHOUT STATUS MIGRAINOSUS: ICD-10-CM

## 2022-02-11 DIAGNOSIS — R51.9 GENERALIZED HEADACHES: ICD-10-CM

## 2022-02-11 PROCEDURE — 99213 OFFICE O/P EST LOW 20 MIN: CPT | Performed by: NURSE PRACTITIONER

## 2022-02-11 PROCEDURE — 99213 OFFICE O/P EST LOW 20 MIN: CPT

## 2022-02-11 RX ORDER — TAPENTADOL HYDROCHLORIDE 50 MG/1
50 TABLET, FILM COATED ORAL EVERY 8 HOURS
Qty: 90 TABLET | Refills: 0 | Status: SHIPPED | OUTPATIENT
Start: 2022-02-17 | End: 2022-03-14 | Stop reason: SDUPTHER

## 2022-02-11 RX ORDER — BUPRENORPHINE 5 UG/H
1 PATCH, EXTENDED RELEASE TRANSDERMAL WEEKLY
Qty: 4 PATCH | Refills: 0 | Status: SHIPPED | OUTPATIENT
Start: 2022-02-11 | End: 2022-04-19 | Stop reason: SDUPTHER

## 2022-02-11 NOTE — PROGRESS NOTES
[facility] [address] PROGRESS NOTE      Patient  completed []  video visit   []   phone call:         Minutes :   [x] in person visit to pain clinic    [x]    to  review Medication Agreement    []  Follow up after procedure   []  Discuss treatment options      Location:  Provider:  working from    []    home    [x]   [facility] - [facility] ,   patient at   [x] pain clinic     []  home         Chief Complaint:  Headache      He c/o headache, all over, His headache has not changed. Alec Still He has long standing history of migraines since he was a teenager. He uses  a butarns 5 mcg patch, 2 weeks on.and 2 weeks off. He gets an aura at times before the start of his migraibne. He has had an array of treatments accupuncture ,chiroparactic treatment, seen at the [facility] [place] had been on morphine and methadone in 2017 and had been on topamax.  He tried aimovig for 2 months which did not improve his headaches. . He reports he is sleeping okay. He does yoga a few times a week,      Migraine   This is a chronic problem. The current episode started more than 1 year ago. The problem occurs constantly. The problem has been unchanged. The pain is located in the frontal, right unilateral, occipital, parietal and temporal region. The pain does not radiate. The pain quality is similar to prior headaches. The quality of the pain is described as throbbing. The pain is at a severity of 5/10. The pain is moderate. Associated symptoms include weight loss. Associated symptoms comments: Aura, visual changes, yesterday a double  . The symptoms are aggravated by bright light and noise. He has tried darkened room (ice) for the symptoms.            Treatment goals:  Functional status: for headaches to be manageable       Aberrancy:   Any alcoholic beverages            Any illegal drugs         Analgesia:       5             Adverse  Effects :itching, nucynta      ADL;s :does yoga      Data:    When was thelast UDS: 6-15-21       Was the UDS appropriate:  [] yes [x]   no    ( he does 2 weeks on and 2 weeks off butrans patch)  Record/Diagnostics Review:      As above, I did review the imaging     DRUG SCREEN, PAIN  Order: [id number]   Status: Final result     Visible to patient: Yes (seen)     Next appt: 02/11/2022 at 01:40 PM in Pain Management Karin OCHOA, APRN - CNP)     Dx: Bilateral headaches; Encounter for lo. ..     1 Result Note    Component Ref Range & Units 6/15/21 1300 2/17/20 1315 4/26/19 1445 7/10/18 1500 7/10/18 1500 8/8/17 1330 9/23/16 1820   Pain Management Drug Panel Interp, Urine  Inconsistent ARUP  Inconsistent CMARUP  Consistent CM  Inconsistent CMARUP   Consistent CM  Inconsistent CM    Comment: (NOTE)   ________________________________________________________________   DRUGS EXPECTED:   NUCYNTA (TAPENTADOL) [TODAY]   BUTRANS (BUPRENORPHINE)   ________________________________________________________________   CONSISTENT with medications provided:   NUCYNTA (TAPENTADOL) : based on tapentadol, tapentadol-o-sulfate   ________________________________________________________________   INCONSISTENT with medications provided:   BUTRANS (BUPRENORPHINE): based on the absence of buprenorphine and   metabolites   ________________________________________________________________   INTERPRETIVE INFORMATION: Targeted drug profile Interp   Interpretation depends on accuracy and completeness of patient   medication information submitted by client. Pill count: appropriate    fill date :    Morphine equivalent dose as reported on OARRS:     Review ofOARRS does not show any aberrant prescription behavior. Medication is helping the patient stay active. Patient denies any side effects and reports adequate analgesia. No sign of misuse/abuse.             Past Medical History:   Diagnosis Date    Asthma     Depression     Headache(784.0)     Klinefelter syndrome        Past Surgical History:   Procedure Laterality Date    UPPP UVULOPALATOPHARYGOPLASTY         Allergies   Allergen Reactions    Food      Bananas, eggs, milk, cherries    Clams   Upset stomach    Seasonal      Hay fever      Shellfish-Derived Products      Stomach issues         Current Outpatient Medications:     tapentadol (NUCYNTA) 50 MG TABS, Take 1 tablet by mouth every 8 hours for 30 days. , Disp: 90 tablet, Rfl: 0    B-D 3CC LUER-BRANDI SYR 11TG3-7/2 22G X 1-1/2\" 3 ML MISC, USE AS DIRECTED WITH TESTOSTERONE, Disp: , Rfl:     divalproex (DEPAKOTE ER) 250 MG extended release tablet, TAKE ONE TABLET BY MOUTH TWICE DAILY, Disp: , Rfl:     testosterone cypionate (DEPOTESTOTERONE CYPIONATE) 200 MG/ML injection, INJECT 1.25 MILLILITER INTRAMUSCULARLY EVERY 2 WEEKS, Disp: , Rfl:     anastrozole (ARIMIDEX) 1 MG tablet, Take 1 mg by mouth daily takes 1/2 tab, Disp: , Rfl:     DULoxetine (CYMBALTA) 20 MG extended release capsule, TAKE 1 CAPSULE BY MOUTH ONCE A DAY, Disp: , Rfl: 2    nadolol (CORGARD) 40 MG tablet, 1/4-1/2 AS DIRECTED FOR TREMOR, Disp: , Rfl: 5    VENTOLIN  (90 Base) MCG/ACT inhaler, TWO SPRAYS BY MOUTH FOUR TIMES A DAY AS NEEDED, Disp: , Rfl: 5    loratadine-pseudoephedrine (CLARITIN-D 12 HOUR) 5-120 MG per extended release tablet, Take 1 tablet by mouth 2 times daily, Disp: , Rfl:     baclofen (LIORESAL) 10 MG tablet, Take 10 mg by mouth 3 times daily, Disp: , Rfl:     cyclobenzaprine (FLEXERIL) 10 MG tablet, , Disp: , Rfl:     Family History   Problem Relation Age of Onset    High Blood Pressure Father     Mental Illness Sister     High Blood Pressure Paternal Grandmother     Cancer Paternal Grandmother     Heart Disease Paternal Grandfather     High Blood Pressure Paternal Grandfather     Stroke Paternal Grandfather        Social History     Socioeconomic History    Marital status: Single     Spouse name: Not on file    Number of children: Not on file    Years of education: Not on file    Highest education level: Not on file   Occupational History    Occupation: disability   Tobacco Use    Smoking status: Never Smoker    Smokeless tobacco: Never Used   Vaping Use    Vaping Use: Never used   Substance and Sexual Activity    Alcohol use: Yes     Comment: rare , hard cider or wine    Drug use: No    Sexual activity: Never   Other Topics Concern    Not on file   Social History Narrative    Not on file     Social Determinants of Health     Financial Resource Strain:     Difficulty of Paying Living Expenses: Not on file   Food Insecurity:     Worried About Running Out of Food in the Last Year: Not on file    Lencho of Food in the Last Year: Not on file   Transportation Needs:     Lack of Transportation (Medical): Not on file    Lack of Transportation (Non-Medical): Not on file   Physical Activity:     Days of Exercise per Week: Not on file    Minutes of Exercise per Session: Not on file   Stress:     Feeling of Stress : Not on file   Social Connections:     Frequency of Communication with Friends and Family: Not on file    Frequency of Social Gatherings with Friends and Family: Not on file    Attends Sabianist Services: Not on file    Active Member of [address] [place] [address] or Organizations: Not on file    Attends Club or Organization Meetings: Not on file    Marital Status: Not on file   Intimate Partner Violence:     Fear of Current or Ex-Partner: Not on file    Emotionally Abused: Not on file    Physically Abused: Not on file    Sexually Abused: Not on file   Housing Stability:     Unable to Pay for Housing in the Last Year: Not on file    Number of [place] in the Last Year: Not on file    Unstable Housing in the Last Year: Not on file         Review of Systems:  Review of Systems   Constitutional: Positive for weight loss. Physical Exam:  There were no vitals taken for this visit. Physical Exam  HENT:      Head: Normocephalic. Comments: No scalp tenderness.  No cervical tenderness  Pulmonary:      Effort: Pulmonary effort is normal.   Neurological:      Mental Status: He is alert and oriented to person, place, and time. Gait: Gait is intact. Comments: Strength 5/5   Psychiatric:         Mood and Affect: Mood normal.         Thought Content: Thought content normal.           Assessment:    Problem List Items Addressed This Visit     Intractable migraine without status migrainosus    Generalized headaches    Encounter for long-term opiate analgesic use    Cervicalgia    Cervical spondylosis without myelopathy    Bilateral headaches - Primary            Treatment Plan:  DISCUSSION: Treatment options discussed withpatient and all questions answered to patient's satisfaction. Possible side effects, risk of tolerance and or dependence and alternative treatments discussed    Obtaining appropriate analgesic effect of treatment   No signs of potential drug abuse or diversion identified    [x] Ill effects of being on chronic pain medications such as sleep disturbances, respiratory depression, hormonal changes, withdrawal symptoms, chronic opioid dependence and tolerance as well as risk of taking opioids with Benzodiazepines and taking opioids along with alcohol,  werediscussed with patient. I had asked the patient to minimize medication use and utilize pain medications only for uncontrolled rest pain or pain with exertional activities. I advised patient not to self-escalate painmedications without consulting with us. At each of patient's future visits we will try to taper pain medications, while adjusting the adjunct medications, and re-evaluating for Physical Therapy to improve spinal andjoint strength. We will continue to have discussions to decrease pain medications as tolerated. Counseled patient on effects their pain medication and /or their medical condition mayhave on their  ability to drive or operate machinery.  Instructed not to drive or operate machinery if drowsy     I also discussed with the patient regarding the dangers of combining narcotic pain medication with tranquilizers, alcohol or illegal drugs or taking the medication any way other than prescribed. The dangers were discussed  including respiratory depression and death. Patient was told to tell  all  physicians regarding the medications he is getting from pain clinic. Patient is warned not to take any unprescribed medications over-the-countermedications that can depress breathing . Patient is advised to talk to the pharmacist or physicians if planning to take any over-the-counter medications before  takeing them. Patient is strongly advised to avoid tranquilizers or  relaxants, illegal drugs  or any medications that can depress breathing  Patient is also advised to tell us if there is any changes in their medications from other physicians.       TREATMENT OPTIONS:       Medication Agreement Requirements Met  Continue Opioid therapy  Script written for  Nucynta, butrans patch  Follow up appointment made

## 2022-03-13 NOTE — PROGRESS NOTES
[facility] PROGRESS NOTE      Patient  completed []  video visit   []   phone call:         Minutes :   [x] in person visit to pain clinic    [x]    to  review Medication Agreement    []  Follow up after procedure   []  Discuss treatment options      Location:  Provider:  working from    []    home    [x]   [facility] - LOURDES SIMMONS ,   patient at   [x] pain clinic     []  home         Chief Complaint:  migraine    He c/o headach right temporal area, unchanged. He has long standing history of migraines since he was a teenager. His headaches are unchanged. He uses  A butrans 5 mcg patch, 2 weeks on and 2 weeks off. He is on  nucynta, he gets an aura sometimes, vision changem like a kaleidoscope prn. Prior treatments included  acupressure which was in [place] several years ( 15) years ago. Chiropractic treatment a few different Chiropractor visits, with neck adjustments x 15 years. He was previously  seen at [facility] in [facility]. He had been on morphine and methadone in 2017 and had been on topamax.  He tried aimovig for 2 months which did not improve his headaches. .  He  is doing yoga       Migraine   This is a chronic problem. The current episode started more than 1 year ago. The problem occurs constantly. The problem has been unchanged. The pain is located in the temporal (right) region. The pain does not radiate. The pain quality is similar to prior headaches. Quality: sharp. The pain is at a severity of 6/10. The pain is moderate. Associated symptoms include blurred vision and nausea. The symptoms are aggravated by noise and bright light. He has tried darkened room (pain meds,) for the symptoms.            Treatment goals:  Functional status: headaches not to get worse      Aberrancy:   Any alcoholic beverages    6        Any illegal drugs   itching      Analgesia:       6              Adverse  Effects :no    ADL;s :doing yoga    Data:    When was thelast UDS: 6-15-21           Was the UDS appropriate:  [] yes []   no    Record/Diagnostics Review:      As above, I did review the imaging     Not on Butrans continuously  DRUG SCREEN, PAIN  Order: [id number]   Status: Final result     Visible to patient: Yes (seen)     Next appt: 03/14/2022 at 02:00 PM in [facility]  DON, APRN - CNP)     Dx: Bilateral headaches; Encounter for lo. ..     1 Result Note    Component Ref Range & Units 6/15/21 1300 2/17/20 1315 4/26/19 1445 7/10/18 1500 7/10/18 1500 8/8/17 1330 9/23/16 1820   Pain Management Drug Panel Interp, Urine  Inconsistent ARUP  Inconsistent CMARUP  Consistent CM  Inconsistent CMARUP   Consistent CM  Inconsistent CM    Comment: (NOTE)   ________________________________________________________________   DRUGS EXPECTED:   NUCYNTA (TAPENTADOL) [TODAY]   BUTRANS (BUPRENORPHINE)   ________________________________________________________________   CONSISTENT with medications provided:   NUCYNTA (TAPENTADOL) : based on tapentadol, tapentadol-o-sulfate   ________________________________________________________________   INCONSISTENT with medications provided:   BUTRANS (BUPRENORPHINE): based on the absence of buprenorphine and   metabolites   ________________________________________________________________   INTERPRETIVE INFORMATION: Targeted drug profile Interp   Interpretation depends on accuracy and completeness of patient   medication information submitted by client. Pill count: appropriate    fill date : fill date is3-19-22 for nucynta , had butrans patch filled on  2-11-22      Morphine equivalent dose as reported on OARRS: 60.12 Periodic Controlled Substance Monitoring: Possible medication side effects, risk of tolerance/dependence & alternative treatments discussed. ,No signs of potential drug abuse or diversion identified. ,Assessed functional status. ,Obtaining appropriate analgesic effect of treatment.  Martin Banuelos, APRN - CNP)  Review ofOARRS does not show any aberrant prescription behavior. Medication is helping the patient stay active. Patient denies any side effects and reports adequate analgesia. No sign of misuse/abuse. Past Medical History:   Diagnosis Date    Asthma     Depression     Headache(784.0)     Klinefelter syndrome        Past Surgical History:   Procedure Laterality Date    UPPP UVULOPALATOPHARYGOPLASTY         Allergies   Allergen Reactions    Food      Bananas, eggs, milk, cherries    Clams   Upset stomach    Seasonal      Hay fever      Shellfish-Derived Products      Stomach issues         Current Outpatient Medications:     [START ON 3/19/2022] tapentadol (NUCYNTA) 50 MG TABS, Take 1 tablet by mouth every 8 hours for 30 days. , Disp: 90 tablet, Rfl: 0    B-D 3CC LUER-BRANDI SYR 11TF3-4/2 22G X 1-1/2\" 3 ML MISC, USE AS DIRECTED WITH TESTOSTERONE, Disp: , Rfl:     divalproex (DEPAKOTE ER) 250 MG extended release tablet, TAKE ONE TABLET BY MOUTH TWICE DAILY, Disp: , Rfl:     testosterone cypionate (DEPOTESTOTERONE CYPIONATE) 200 MG/ML injection, INJECT 1.25 MILLILITER INTRAMUSCULARLY EVERY 2 WEEKS, Disp: , Rfl:     anastrozole (ARIMIDEX) 1 MG tablet, Take 1 mg by mouth daily takes 1/2 tab, Disp: , Rfl:     DULoxetine (CYMBALTA) 20 MG extended release capsule, TAKE 1 CAPSULE BY MOUTH ONCE A DAY, Disp: , Rfl: 2    nadolol (CORGARD) 40 MG tablet, 1/4-1/2 AS DIRECTED FOR TREMOR, Disp: , Rfl: 5    VENTOLIN  (90 Base) MCG/ACT inhaler, TWO SPRAYS BY MOUTH FOUR TIMES A DAY AS NEEDED, Disp: , Rfl: 5    loratadine-pseudoephedrine (CLARITIN-D 12 HOUR) 5-120 MG per extended release tablet, Take 1 tablet by mouth 2 times daily, Disp: , Rfl:     baclofen (LIORESAL) 10 MG tablet, Take 10 mg by mouth 3 times daily, Disp: , Rfl:     cyclobenzaprine (FLEXERIL) 10 MG tablet, , Disp: , Rfl:     Family History   Problem Relation Age of Onset    High Blood Pressure Father     Mental Illness Sister  High Blood Pressure Paternal Grandmother     Cancer Paternal Grandmother     Heart Disease Paternal Grandfather     High Blood Pressure Paternal Grandfather     Stroke Paternal Grandfather        Social History     Socioeconomic History    Marital status: Single     Spouse name: Not on file    Number of children: Not on file    Years of education: Not on file    Highest education level: Not on file   Occupational History    Occupation: disability   Tobacco Use    Smoking status: Never Smoker    Smokeless tobacco: Never Used   Vaping Use    Vaping Use: Never used   Substance and Sexual Activity    Alcohol use: Yes     Comment: rare , hard cider or wine    Drug use: No    Sexual activity: Never   Other Topics Concern    Not on file   Social History Narrative    Not on file     Social Determinants of Health     Financial Resource Strain:     Difficulty of Paying Living Expenses: Not on file   Food Insecurity:     Worried About Running Out of Food in the Last Year: Not on file    Lencho of Food in the Last Year: Not on file   Transportation Needs:     Lack of Transportation (Medical): Not on file    Lack of Transportation (Non-Medical):  Not on file   Physical Activity:     Days of Exercise per Week: Not on file    Minutes of Exercise per Session: Not on file   Stress:     Feeling of Stress : Not on file   Social Connections:     Frequency of Communication with Friends and Family: Not on file    Frequency of Social Gatherings with Friends and Family: Not on file    Attends [employer] Services: Not on file    Active Member of Clubs or Organizations: Not on file    Attends Club or Organization Meetings: Not on file    Marital Status: Not on file   Intimate Partner Violence:     Fear of Current or Ex-Partner: Not on file    Emotionally Abused: Not on file    Physically Abused: Not on file    Sexually Abused: Not on file   Housing Stability:     Unable to Pay for Housing in the Last Year: Not on file    Number of Places Lived in the Last Year: Not on file    Unstable Housing in the Last Year: Not on file         Review of Systems:  Review of Systems   HENT: Negative. Eyes: Positive for blurred vision. Cardiovascular: Negative. Respiratory: Positive for shortness of breath. Endocrine: Negative. Hematologic/Lymphatic: Negative. Musculoskeletal: Negative. Gastrointestinal: Positive for nausea. Genitourinary: Negative. Neurological: Positive for headaches. Psychiatric/Behavioral: Negative. Physical Exam:  BP (!) 160/104   Pulse 90   Resp 18   Ht 6' 3\" (1.905 m)   Wt (!) 310 lb (140.6 kg)   SpO2 97%   BMI 38.75 kg/m²     Physical Exam  HENT:      Head: Normocephalic. Comments: Tender right temporal area, normal ROM cervical spine  Neurological:      Mental Status: He is alert. Motor: Motor function is intact. Gait: Gait is intact. Deep Tendon Reflexes:      Reflex Scores:       Tricep reflexes are 2+ on the right side and 2+ on the left side. Bicep reflexes are 2+ on the right side and 2+ on the left side. Brachioradialis reflexes are 2+ on the right side and 2+ on the left side.           Assessment:    Problem List Items Addressed This Visit     Intractable migraine without status migrainosus    Relevant Medications    tapentadol (NUCYNTA) 50 MG TABS (Start on 3/19/2022)    Generalized headaches - Primary    Relevant Medications    tapentadol (NUCYNTA) 50 MG TABS (Start on 3/19/2022)    Encounter for long-term opiate analgesic use    Relevant Medications    tapentadol (NUCYNTA) 50 MG TABS (Start on 3/19/2022)    Cervicalgia    Relevant Medications    tapentadol (NUCYNTA) 50 MG TABS (Start on 3/19/2022)    Cervical spondylosis without myelopathy    Relevant Medications    tapentadol (NUCYNTA) 50 MG TABS (Start on 3/19/2022)    Bilateral headaches    Relevant Medications    tapentadol (NUCYNTA) 50 MG TABS (Start on 3/19/2022)              Treatment Plan:  DISCUSSION: Treatment options discussed withpatient and all questions answered to patient's satisfaction. Possible side effects, risk of tolerance and or dependence and alternative treatments discussed    Obtaining appropriate analgesic effect of treatment   No signs of potential drug abuse or diversion identified    [x] Ill effects of being on chronic pain medications such as sleep disturbances, respiratory depression, hormonal changes, withdrawal symptoms, chronic opioid dependence and tolerance as well as risk of taking opioids with Benzodiazepines and taking opioids along with alcohol,  werediscussed with patient. I had asked the patient to minimize medication use and utilize pain medications only for uncontrolled rest pain or pain with exertional activities. I advised patient not to self-escalate painmedications without consulting with us. At each of patient's future visits we will try to taper pain medications, while adjusting the adjunct medications, and re-evaluating for Physical Therapy to improve spinal andjoint strength. We will continue to have discussions to decrease pain medications as tolerated. Counseled patient on effects their pain medication and /or their medical condition mayhave on their  ability to drive or operate machinery. Instructed not to drive or operate machinery if drowsy     I also discussed with the patient regarding the dangers of combining narcotic pain medication with tranquilizers, alcohol or illegal drugs or taking the medication any way other than prescribed. The dangers were discussed  including respiratory depression and death. Patient was told to tell  all  physicians regarding the medications he is getting from pain clinic. Patient is warned not to take any unprescribed medications over-the-countermedications that can depress breathing .  Patient is advised to talk to the pharmacist or physicians if planning to take any over-the-counter medications before  takeing them. Patient is strongly advised to avoid tranquilizers or  relaxants, illegal drugs  or any medications that can depress breathing  Patient is also advised to tell us if there is any changes in their medications from other physicians.             TREATMENT OPTIONS:       Medication Agreement Requirements Met  Continue Opioid therapy  Script written for  nucynta  Follow up appointment made

## 2022-03-14 ENCOUNTER — HOSPITAL ENCOUNTER (OUTPATIENT)
Dept: PAIN MANAGEMENT | Age: 37
Discharge: HOME | End: 2022-03-14
Payer: MEDICAID

## 2022-03-14 VITALS
SYSTOLIC BLOOD PRESSURE: 160 MMHG | DIASTOLIC BLOOD PRESSURE: 104 MMHG | BODY MASS INDEX: 38.54 KG/M2 | OXYGEN SATURATION: 97 % | HEART RATE: 90 BPM | WEIGHT: 310 LBS | RESPIRATION RATE: 18 BRPM | HEIGHT: 75 IN

## 2022-03-14 DIAGNOSIS — R51.9 BILATERAL HEADACHES: ICD-10-CM

## 2022-03-14 DIAGNOSIS — G43.519 INTRACTABLE PERSISTENT MIGRAINE AURA WITHOUT CEREBRAL INFARCTION AND WITHOUT STATUS MIGRAINOSUS: ICD-10-CM

## 2022-03-14 DIAGNOSIS — Z79.891 ENCOUNTER FOR LONG-TERM OPIATE ANALGESIC USE: ICD-10-CM

## 2022-03-14 DIAGNOSIS — M54.2 CERVICALGIA: ICD-10-CM

## 2022-03-14 DIAGNOSIS — R51.9 GENERALIZED HEADACHES: Primary | ICD-10-CM

## 2022-03-14 DIAGNOSIS — G43.919 INTRACTABLE MIGRAINE WITHOUT STATUS MIGRAINOSUS, UNSPECIFIED MIGRAINE TYPE: ICD-10-CM

## 2022-03-14 DIAGNOSIS — M47.812 CERVICAL SPONDYLOSIS WITHOUT MYELOPATHY: ICD-10-CM

## 2022-03-14 PROCEDURE — 99213 OFFICE O/P EST LOW 20 MIN: CPT | Performed by: NURSE PRACTITIONER

## 2022-03-14 PROCEDURE — 99213 OFFICE O/P EST LOW 20 MIN: CPT

## 2022-03-14 RX ORDER — TAPENTADOL HYDROCHLORIDE 50 MG/1
50 TABLET, FILM COATED ORAL EVERY 8 HOURS
Qty: 90 TABLET | Refills: 0 | Status: SHIPPED | OUTPATIENT
Start: 2022-03-19 | End: 2022-04-19 | Stop reason: SDUPTHER

## 2022-03-14 ASSESSMENT — ENCOUNTER SYMPTOMS
BLURRED VISION: 1
NAUSEA: 1
SHORTNESS OF BREATH: 1

## 2022-03-14 ASSESSMENT — PAIN SCALES - GENERAL: PAINLEVEL_OUTOF10: 6

## 2022-04-19 ENCOUNTER — HOSPITAL ENCOUNTER (OUTPATIENT)
Dept: PAIN MANAGEMENT | Age: 37
Discharge: HOME | End: 2022-04-19
Payer: MEDICAID

## 2022-04-19 VITALS
OXYGEN SATURATION: 93 % | HEART RATE: 101 BPM | WEIGHT: 310 LBS | SYSTOLIC BLOOD PRESSURE: 127 MMHG | BODY MASS INDEX: 38.54 KG/M2 | HEIGHT: 75 IN | DIASTOLIC BLOOD PRESSURE: 85 MMHG

## 2022-04-19 DIAGNOSIS — M54.2 CERVICALGIA: ICD-10-CM

## 2022-04-19 DIAGNOSIS — M47.812 CERVICAL SPONDYLOSIS WITHOUT MYELOPATHY: ICD-10-CM

## 2022-04-19 DIAGNOSIS — R51.9 BILATERAL HEADACHES: ICD-10-CM

## 2022-04-19 DIAGNOSIS — G43.519 INTRACTABLE PERSISTENT MIGRAINE AURA WITHOUT CEREBRAL INFARCTION AND WITHOUT STATUS MIGRAINOSUS: Primary | ICD-10-CM

## 2022-04-19 DIAGNOSIS — G43.919 INTRACTABLE MIGRAINE WITHOUT STATUS MIGRAINOSUS, UNSPECIFIED MIGRAINE TYPE: ICD-10-CM

## 2022-04-19 DIAGNOSIS — Z79.891 ENCOUNTER FOR LONG-TERM OPIATE ANALGESIC USE: ICD-10-CM

## 2022-04-19 DIAGNOSIS — Q98.4 KLINEFELTER SYNDROME: ICD-10-CM

## 2022-04-19 DIAGNOSIS — R51.9 GENERALIZED HEADACHES: ICD-10-CM

## 2022-04-19 PROCEDURE — 99213 OFFICE O/P EST LOW 20 MIN: CPT | Performed by: NURSE PRACTITIONER

## 2022-04-19 PROCEDURE — 99213 OFFICE O/P EST LOW 20 MIN: CPT

## 2022-04-19 RX ORDER — BUPRENORPHINE 5 UG/H
1 PATCH, EXTENDED RELEASE TRANSDERMAL WEEKLY
Qty: 4 PATCH | Refills: 0 | Status: SHIPPED | OUTPATIENT
Start: 2022-04-19 | End: 2022-05-17 | Stop reason: SDUPTHER

## 2022-04-19 RX ORDER — TAPENTADOL HYDROCHLORIDE 50 MG/1
50 TABLET, FILM COATED ORAL EVERY 8 HOURS
Qty: 90 TABLET | Refills: 0 | Status: SHIPPED | OUTPATIENT
Start: 2022-04-19 | End: 2022-05-17 | Stop reason: SDUPTHER

## 2022-04-19 ASSESSMENT — PAIN DESCRIPTION - PAIN TYPE: TYPE: CHRONIC PAIN

## 2022-04-19 ASSESSMENT — ENCOUNTER SYMPTOMS
NAUSEA: 0
VOMITING: 0
BACK PAIN: 1
PHOTOPHOBIA: 1
CONSTIPATION: 0
COUGH: 0
SHORTNESS OF BREATH: 0

## 2022-04-19 ASSESSMENT — PAIN DESCRIPTION - FREQUENCY: FREQUENCY: CONTINUOUS

## 2022-04-19 ASSESSMENT — PAIN DESCRIPTION - DESCRIPTORS: DESCRIPTORS: STABBING

## 2022-04-19 ASSESSMENT — PAIN DESCRIPTION - DIRECTION: RADIATING_TOWARDS: DOES NOT RADIATE

## 2022-04-19 ASSESSMENT — PAIN SCALES - GENERAL: PAINLEVEL_OUTOF10: 6

## 2022-04-19 ASSESSMENT — PAIN DESCRIPTION - LOCATION: LOCATION: HEAD

## 2022-04-19 ASSESSMENT — PAIN DESCRIPTION - PROGRESSION: CLINICAL_PROGRESSION: NOT CHANGED

## 2022-04-19 NOTE — PROGRESS NOTES
Chief Complaint:    PMH     He c/o headache left side which is unchanged. He is on Butrans and nucynta which help. He has long standing history of Migraines. He was seen at [facility] in [facility]. He tried Aimovig for two months with no benefit. He has tried accupressure, chiropractor and yoga for pain relief. He is opioid dependant for pain control and takes Nucynta and butrans patch . He says he has HA daily that will last most of the day, pain scale ranging from 5-10. No specific triggers identified. Does wear sunglasses in bright lights. He takes nucynta BID and uses butrans patch on for 2 weeks and off for 2 weeks to help control the pain. HPI:   Migraine   This is a chronic problem. The current episode started more than 1 year ago. The problem occurs constantly. The problem has been unchanged. The pain is located in the right unilateral and frontal region. The pain does not radiate. The quality of the pain is described as aching and stabbing. The pain is at a severity of 6/10. The pain is moderate. Associated symptoms include back pain, phonophobia and photophobia. Pertinent negatives include no coughing, fever, nausea or vomiting. Nothing aggravates the symptoms. He has tried acetaminophen, antidepressants, beta blockers, cold packs, darkened room, ergotamines, Excedrin, NSAIDs, oral narcotics and triptans for the symptoms. The treatment provided mild relief. His past medical history is significant for migraine headaches.      Medication Refill:     Pain score Today:  6  Adverse effects (Constipation / Nausea / Sedation / sexual Dysfunction / others) : sedation, itching with the nucynta  Mood: fair  Sleep pattern and quality: poor  Activity level: fair    Pill count Today: Nucynta 0 tabs was due 4/18  Last dose taken 04/19/2022  OARRS report reviewed today: yes  Morphine equivalent: 60  ER/Hospitalizations/PCP visit related to pain since last visit:no   Any legal problems e.g. DUI etc.:No  Satisfied with current management: Yes    Opioid Contract:01/14/2022  Last Urine Dug screen dated:06/15/2021    No results found for: LABA1C  No results found for: EAG    Past Medical History, Past Surgical History, Social History, Allergies and Medications reviewed and updated in EPIC as indicated    Family History reviewed and is noncontributory. Controlled Substance Monitoring:    Acute and Chronic Pain Monitoring:   RX Monitoring 4/19/2022   Attestation -   Acute Pain Prescriptions -   Periodic Controlled Substance Monitoring Possible medication side effects, risk of tolerance/dependence & alternative treatments discussed. ;No signs of potential drug abuse or diversion identified. ;Assessed functional status. ;Obtaining appropriate analgesic effect of treatment. Chronic Pain > 50 MEDD -   Chronic Pain > 80 MEDD -             Periodic Controlled Substance Monitoring: Possible medication side effects, risk of tolerance/dependence & alternative treatments discussed. ,No signs of potential drug abuse or diversion identified. ,Assessed functional status. ,Obtaining appropriate analgesic effect of treatment. Po Crowe, APRN - CNP)      Past Medical History:   Diagnosis Date    Asthma     Depression     Headache(784.0)     Klinefelter syndrome        Past Surgical History:   Procedure Laterality Date    UPPP UVULOPALATOPHARYGOPLASTY         Allergies   Allergen Reactions    Food      Bananas, eggs, milk, cherries    Clams   Upset stomach    Seasonal      Hay fever      Shellfish-Derived Products      Stomach issues         Current Outpatient Medications:     tapentadol (NUCYNTA) 50 MG TABS, Take 1 tablet by mouth every 8 hours for 30 days. , Disp: 90 tablet, Rfl: 0    BUTRANS 5 MCG/HR PTWK, Place 1 patch onto the skin once a week for 28 days. , Disp: 4 patch, Rfl: 0    B-D 3CC LUER-BRANDI SYR 44ZQ2-3/2 22G X 1-1/2\" 3 ML MISC, USE AS DIRECTED WITH TESTOSTERONE, Disp: , Rfl:     divalproex (DEPAKOTE ER) 250 MG extended release tablet, TAKE ONE TABLET BY MOUTH TWICE DAILY, Disp: , Rfl:     testosterone cypionate (DEPOTESTOTERONE CYPIONATE) 200 MG/ML injection, INJECT 1.25 MILLILITER INTRAMUSCULARLY EVERY 2 WEEKS, Disp: , Rfl:     anastrozole (ARIMIDEX) 1 MG tablet, Take 1 mg by mouth daily takes 1/2 tab, Disp: , Rfl:     DULoxetine (CYMBALTA) 20 MG extended release capsule, TAKE 1 CAPSULE BY MOUTH ONCE A DAY, Disp: , Rfl: 2    nadolol (CORGARD) 40 MG tablet, 1/4-1/2 AS DIRECTED FOR TREMOR, Disp: , Rfl: 5    VENTOLIN  (90 Base) MCG/ACT inhaler, TWO SPRAYS BY MOUTH FOUR TIMES A DAY AS NEEDED, Disp: , Rfl: 5    loratadine-pseudoephedrine (CLARITIN-D 12 HOUR) 5-120 MG per extended release tablet, Take 1 tablet by mouth 2 times daily, Disp: , Rfl:     baclofen (LIORESAL) 10 MG tablet, Take 10 mg by mouth 3 times daily, Disp: , Rfl:     cyclobenzaprine (FLEXERIL) 10 MG tablet, , Disp: , Rfl:     Family History   Problem Relation Age of Onset    High Blood Pressure Father     Mental Illness Sister     High Blood Pressure Paternal Grandmother     Cancer Paternal Grandmother     Heart Disease Paternal Grandfather     High Blood Pressure Paternal Grandfather     Stroke Paternal Grandfather        Social History     Socioeconomic History    Marital status: Single     Spouse name: Not on file    Number of children: Not on file    Years of education: Not on file    Highest education level: Not on file   Occupational History    Occupation: disability   Tobacco Use    Smoking status: Never Smoker    Smokeless tobacco: Never Used   Vaping Use    Vaping Use: Never used   Substance and Sexual Activity    Alcohol use: Yes     Comment: rare , hard cider or wine    Drug use: No    Sexual activity: Never   Other Topics Concern    Not on file   Social History Narrative    Not on file     Social Determinants of Health     Financial Resource Strain:     Difficulty of Paying Living Expenses: Not on file Food Insecurity:     Worried About Running Out of Food in the Last Year: Not on file    Lencho of Food in the Last Year: Not on file   Transportation Needs:     Lack of Transportation (Medical): Not on file    Lack of Transportation (Non-Medical): Not on file   Physical Activity:     Days of Exercise per Week: Not on file    Minutes of Exercise per Session: Not on file   Stress:     Feeling of Stress : Not on file   Social Connections:     Frequency of Communication with Friends and Family: Not on file    Frequency of Social Gatherings with Friends and Family: Not on file    Attends Caoda[employer] Services: Not on file    Active Member of [address] [place] [address] [place] or Organizations: Not on file    Attends Club or Organization Meetings: Not on file    Marital Status: Not on file   Intimate Partner Violence:     Fear of Current or Ex-Partner: Not on file    Emotionally Abused: Not on file    Physically Abused: Not on file    Sexually Abused: Not on file   Housing Stability:     Unable to Pay for Housing in the Last Year: Not on file    Number of [place] in the Last Year: Not on file    Unstable Housing in the Last Year: Not on file       Review of Systems:  Review of Systems   Constitutional: Negative for chills and fever. Eyes: Positive for photophobia. Cardiovascular: Negative for chest pain. Respiratory: Negative for cough and shortness of breath. Musculoskeletal: Positive for back pain. Gastrointestinal: Negative for constipation, nausea and vomiting. Physical Exam:  /85   Pulse 101   Ht 6' 3\" (1.905 m)   Wt (!) 310 lb (140.6 kg)   SpO2 93%   BMI 38.75 kg/m²     Physical Exam  Cardiovascular:      Rate and Rhythm: Normal rate. Pulmonary:      Effort: Pulmonary effort is normal.   Musculoskeletal:         General: Normal range of motion. Skin:     General: Skin is warm and dry. Neurological:      Mental Status: He is alert and oriented to person, place, and time. Assessment:  Problem List Items Addressed This Visit     Intractable migraine without status migrainosus - Primary    Relevant Medications    tapentadol (NUCYNTA) 50 MG TABS    BUTRANS 5 MCG/HR PTWK    Bilateral headaches    Relevant Medications    tapentadol (NUCYNTA) 50 MG TABS    BUTRANS 5 MCG/HR PTWK    Encounter for long-term opiate analgesic use    Relevant Medications    tapentadol (NUCYNTA) 50 MG TABS    Cervicalgia    Relevant Medications    tapentadol (NUCYNTA) 50 MG TABS    BUTRANS 5 MCG/HR PTWK    Cervical spondylosis without myelopathy    Relevant Medications    tapentadol (NUCYNTA) 50 MG TABS    BUTRANS 5 MCG/HR PTWK    Generalized headaches    Relevant Medications    tapentadol (NUCYNTA) 50 MG TABS    BUTRANS 5 MCG/HR PTWK    Klinefelter syndrome    Relevant Medications    BUTRANS 5 MCG/HR PTWK             Treatment Plan:  Patient relates current medications are helping the pain. Patient reports taking pain medications as prescribed, denies obtaining medications from different sources and denies use of illegal drugs. Patient denies side effects from medications like nausea, vomiting, constipation or drowsiness. Patient reports current activities of daily living are possible due to medications and would like to continue them. As always, we encourage daily stretching and strengthening exercises, and recommend minimizing use of pain medications unless patient cannot get through daily activities due to pain. Contract requirements met. Continue opioid therapy. Script written for nucynta and butrans  Follow up appointment made for 4 weeks    I have reviewed the chief complaint and history of present illness (including ROS and [address]) and vital documentation by my staff and I agree with their documentation and have added where applicable.

## 2022-05-17 ENCOUNTER — HOSPITAL ENCOUNTER (OUTPATIENT)
Dept: PAIN MANAGEMENT | Age: 37
Discharge: HOME | End: 2022-05-17
Payer: MEDICAID

## 2022-05-17 VITALS
SYSTOLIC BLOOD PRESSURE: 148 MMHG | HEIGHT: 75 IN | DIASTOLIC BLOOD PRESSURE: 90 MMHG | WEIGHT: 310 LBS | OXYGEN SATURATION: 95 % | TEMPERATURE: 97.3 F | BODY MASS INDEX: 38.54 KG/M2 | HEART RATE: 78 BPM | RESPIRATION RATE: 18 BRPM

## 2022-05-17 DIAGNOSIS — R51.9 BILATERAL HEADACHES: ICD-10-CM

## 2022-05-17 DIAGNOSIS — G43.519 INTRACTABLE PERSISTENT MIGRAINE AURA WITHOUT CEREBRAL INFARCTION AND WITHOUT STATUS MIGRAINOSUS: Primary | ICD-10-CM

## 2022-05-17 DIAGNOSIS — M54.2 CERVICALGIA: ICD-10-CM

## 2022-05-17 DIAGNOSIS — R51.9 GENERALIZED HEADACHES: ICD-10-CM

## 2022-05-17 DIAGNOSIS — Z79.891 ENCOUNTER FOR LONG-TERM OPIATE ANALGESIC USE: ICD-10-CM

## 2022-05-17 DIAGNOSIS — G43.919 INTRACTABLE MIGRAINE WITHOUT STATUS MIGRAINOSUS, UNSPECIFIED MIGRAINE TYPE: ICD-10-CM

## 2022-05-17 DIAGNOSIS — M47.812 CERVICAL SPONDYLOSIS WITHOUT MYELOPATHY: ICD-10-CM

## 2022-05-17 DIAGNOSIS — Q98.4 KLINEFELTER SYNDROME: ICD-10-CM

## 2022-05-17 PROCEDURE — 99213 OFFICE O/P EST LOW 20 MIN: CPT

## 2022-05-17 PROCEDURE — 99213 OFFICE O/P EST LOW 20 MIN: CPT | Performed by: NURSE PRACTITIONER

## 2022-05-17 PROCEDURE — 80307 DRUG TEST PRSMV CHEM ANLYZR: CPT

## 2022-05-17 RX ORDER — BUPRENORPHINE 5 UG/H
1 PATCH, EXTENDED RELEASE TRANSDERMAL WEEKLY
Qty: 4 PATCH | Refills: 0 | Status: SHIPPED | OUTPATIENT
Start: 2022-05-21 | End: 2022-06-16 | Stop reason: SDUPTHER

## 2022-05-17 RX ORDER — TAPENTADOL HYDROCHLORIDE 50 MG/1
50 TABLET, FILM COATED ORAL EVERY 8 HOURS
Qty: 90 TABLET | Refills: 0 | Status: SHIPPED | OUTPATIENT
Start: 2022-05-21 | End: 2022-06-16 | Stop reason: SDUPTHER

## 2022-05-17 ASSESSMENT — PAIN SCALES - GENERAL: PAINLEVEL_OUTOF10: 5

## 2022-05-17 ASSESSMENT — ENCOUNTER SYMPTOMS
VOMITING: 0
NAUSEA: 0
BLURRED VISION: 0

## 2022-05-21 LAB
6-ACETYLMORPHINE, UR: NOT DETECTED
7-AMINOCLONAZEPAM, URINE: NOT DETECTED
ALPHA-OH-ALPRAZ, URINE: NOT DETECTED
ALPHA-OH-MIDAZOLAM, URINE: NOT DETECTED
ALPRAZOLAM, URINE: NOT DETECTED
AMPHETAMINES, URINE: NOT DETECTED
BARBITURATES, URINE: NOT DETECTED
BENZOYLECGONINE, UR: NOT DETECTED
BUPRENORPHINE URINE: NOT DETECTED
CARISOPRODOL, UR: NOT DETECTED
CLONAZEPAM, URINE: NOT DETECTED
CODEINE, URINE: NOT DETECTED
CREATININE URINE: 286.1 MG/DL (ref 20–400)
DIAZEPAM, URINE: NOT DETECTED
DRUGS EXPECTED, UR: NORMAL
EER HI RES INTERP UR: NORMAL
ETHYL GLUCURONIDE UR: NOT DETECTED
FENTANYL URINE: NOT DETECTED
GABAPENTIN: NOT DETECTED
HYDROCODONE, URINE: NOT DETECTED
HYDROMORPHONE, URINE: NOT DETECTED
LORAZEPAM, URINE: NOT DETECTED
MARIJUANA METAB, UR: NOT DETECTED
MDA, UR: NOT DETECTED
MDEA, EVE, UR: NOT DETECTED
MDMA URINE: NOT DETECTED
MEPERIDINE METAB, UR: NOT DETECTED
METHADONE, URINE: NOT DETECTED
METHAMPHETAMINE, URINE: NOT DETECTED
METHYLPHENIDATE: NOT DETECTED
MIDAZOLAM, URINE: NOT DETECTED
MORPHINE URINE: NOT DETECTED
NALOXONE URINE: NOT DETECTED
NORBUPRENORPHINE, URINE: NOT DETECTED
NORDIAZEPAM, URINE: NOT DETECTED
NORFENTANYL, URINE: NOT DETECTED
NORHYDROCODONE, URINE: NOT DETECTED
NOROXYCODONE, URINE: NOT DETECTED
NOROXYMORPHONE, URINE: NOT DETECTED
OXAZEPAM, URINE: NOT DETECTED
OXYCODONE URINE: NOT DETECTED
OXYMORPHONE, URINE: NOT DETECTED
PAIN MANAGEMENT DRUG PANEL INTERP, URINE: NORMAL
PAIN MGT DRUG PANEL, HI RES, UR: NORMAL
PCP,URINE: NOT DETECTED
PHENTERMINE, UR: NOT DETECTED
PREGABALIN: NOT DETECTED
TAPENTADOL, URINE: PRESENT
TAPENTADOL-O-SULFATE, URINE: PRESENT
TEMAZEPAM, URINE: NOT DETECTED
TRAMADOL, URINE: NOT DETECTED
ZOLPIDEM METABOLITE (ZCA), URINE: NOT DETECTED
ZOLPIDEM, URINE: NOT DETECTED

## 2022-06-16 ENCOUNTER — HOSPITAL ENCOUNTER (OUTPATIENT)
Dept: PAIN MANAGEMENT | Age: 37
Discharge: HOME | End: 2022-06-16
Payer: MEDICAID

## 2022-06-16 VITALS
BODY MASS INDEX: 38.54 KG/M2 | RESPIRATION RATE: 18 BRPM | TEMPERATURE: 97.3 F | SYSTOLIC BLOOD PRESSURE: 140 MMHG | DIASTOLIC BLOOD PRESSURE: 98 MMHG | HEART RATE: 95 BPM | OXYGEN SATURATION: 96 % | WEIGHT: 310 LBS | HEIGHT: 75 IN

## 2022-06-16 DIAGNOSIS — G43.519 INTRACTABLE PERSISTENT MIGRAINE AURA WITHOUT CEREBRAL INFARCTION AND WITHOUT STATUS MIGRAINOSUS: ICD-10-CM

## 2022-06-16 DIAGNOSIS — M47.812 CERVICAL SPONDYLOSIS WITHOUT MYELOPATHY: ICD-10-CM

## 2022-06-16 DIAGNOSIS — R51.9 GENERALIZED HEADACHES: ICD-10-CM

## 2022-06-16 DIAGNOSIS — Z79.891 ENCOUNTER FOR LONG-TERM OPIATE ANALGESIC USE: ICD-10-CM

## 2022-06-16 DIAGNOSIS — M54.2 CERVICALGIA: ICD-10-CM

## 2022-06-16 DIAGNOSIS — R51.9 BILATERAL HEADACHES: ICD-10-CM

## 2022-06-16 DIAGNOSIS — G43.919 INTRACTABLE MIGRAINE WITHOUT STATUS MIGRAINOSUS, UNSPECIFIED MIGRAINE TYPE: ICD-10-CM

## 2022-06-16 PROCEDURE — 99214 OFFICE O/P EST MOD 30 MIN: CPT | Performed by: ANESTHESIOLOGY

## 2022-06-16 PROCEDURE — 99213 OFFICE O/P EST LOW 20 MIN: CPT

## 2022-06-16 RX ORDER — TAPENTADOL HYDROCHLORIDE 50 MG/1
50 TABLET, FILM COATED ORAL 2 TIMES DAILY PRN
Qty: 60 TABLET | Refills: 0 | Status: SHIPPED | OUTPATIENT
Start: 2022-06-16 | End: 2022-07-16

## 2022-06-16 RX ORDER — BUPRENORPHINE 5 UG/H
1 PATCH, EXTENDED RELEASE TRANSDERMAL WEEKLY
Qty: 4 PATCH | Refills: 1 | Status: SHIPPED | OUTPATIENT
Start: 2022-06-16 | End: 2022-07-14

## 2022-06-16 ASSESSMENT — PAIN SCALES - GENERAL: PAINLEVEL_OUTOF10: 7

## 2022-06-16 ASSESSMENT — ENCOUNTER SYMPTOMS
EYES NEGATIVE: 1
RESPIRATORY NEGATIVE: 1

## 2022-06-16 NOTE — PROGRESS NOTES
The patient is a 31 y. o. Non- / non  male. Chief Complaint   Patient presents with    Migraine    Medication Refill        HPI    Medication Refill:    Butrans patch   Nucynta    Pain score Today:  7  Adverse effects (Constipation / Nausea / Sedation / sexual Dysfunction / others) :  Tired   Mood: fair  Sleep pattern and quality: fair  Activity level: poor    Pill count Today:  Butrans patch - 0  Nucynta - 32    Last dose taken 06/16/2022 afternoon  OARRS report reviewed today: yes  ER/Hospitalizations/PCP visit related to pain since last visit:no   Any legal problems e.g. DUI etc.:No  Satisfied with current management: Yes    Opioid Contract: 01/11/2022  Last Urine Dug screen dated: 05/17/2022    No results found for: LABA1C  No results found for: EAG    Past Medical History, Past Surgical History, Social History, Allergies and Medications reviewed and updated in EPIC as indicated    Family History reviewed and is noncontributory.         Past Medical History:   Diagnosis Date    Asthma     Depression     Headache(784.0)     Klinefelter syndrome         Past Surgical History:   Procedure Laterality Date    UPPP UVULOPALATOPHARYGOPLASTY         Social History     Socioeconomic History    Marital status: Single     Spouse name: None    Number of children: None    Years of education: None    Highest education level: None   Occupational History    Occupation: disability   Tobacco Use    Smoking status: Never Smoker    Smokeless tobacco: Never Used   Vaping Use    Vaping Use: Never used   Substance and Sexual Activity    Alcohol use: Yes     Comment: rare , hard cider or wine    Drug use: No    Sexual activity: Never   Other Topics Concern    None   Social History Narrative    None     Social Determinants of Health     Financial Resource Strain:     Difficulty of Paying Living Expenses: Not on file   Food Insecurity:     Worried About Running Out of Food in the Last Year: Not on file    [address] in the Last Year: Not on file   Transportation Needs:     Lack of Transportation (Medical): Not on file    Lack of Transportation (Non-Medical): Not on file   Physical Activity:     Days of Exercise per Week: Not on file    Minutes of Exercise per Session: Not on file   Stress:     Feeling of Stress : Not on file   Social Connections:     Frequency of Communication with Friends and Family: Not on file    Frequency of Social Gatherings with Friends and Family: Not on file    Attends Zoroastrian Services: Not on file    Active Member of [address] [place] [address] [employer] or Organizations: Not on file    Attends Club or Organization Meetings: Not on file    Marital Status: Not on file   Intimate Partner Violence:     Fear of Current or Ex-Partner: Not on file    Emotionally Abused: Not on file    Physically Abused: Not on file    Sexually Abused: Not on file   Housing Stability:     Unable to Pay for Housing in the Last Year: Not on file    Number of [place] in the Last Year: Not on file    Unstable Housing in the Last Year: Not on file       Family History   Problem Relation Age of Onset    High Blood Pressure Father     Mental Illness Sister     High Blood Pressure Paternal Grandmother     Cancer Paternal Grandmother     Heart Disease Paternal Grandfather     High Blood Pressure Paternal Grandfather     Stroke Paternal Grandfather        Allergies   Allergen Reactions    Food      Bananas, eggs, milk, cherries    Clams   Upset stomach    Seasonal      Hay fever      Shellfish-Derived Products      Stomach issues       There were no vitals filed for this visit. Current Outpatient Medications   Medication Sig Dispense Refill    tapentadol (NUCYNTA) 50 MG TABS Take 1 tablet by mouth every 8 hours for 30 days. 90 tablet 0    BUTRANS 5 MCG/HR PTWK Place 1 patch onto the skin once a week for 28 days.  4 patch 0    B-D 3CC LUER-BRANDI SYR 23OI4-5/2 22G X 1-1/2\" 3 ML MISC USE AS DIRECTED WITH TESTOSTERONE      divalproex (DEPAKOTE ER) 250 MG extended release tablet TAKE ONE TABLET BY MOUTH TWICE DAILY      testosterone cypionate (DEPOTESTOTERONE CYPIONATE) 200 MG/ML injection INJECT 1.25 MILLILITER INTRAMUSCULARLY EVERY 2 WEEKS      anastrozole (ARIMIDEX) 1 MG tablet Take 1 mg by mouth daily takes 1/2 tab      DULoxetine (CYMBALTA) 20 MG extended release capsule TAKE 1 CAPSULE BY MOUTH ONCE A DAY  2    nadolol (CORGARD) 40 MG tablet 1/4-1/2 AS DIRECTED FOR TREMOR  5    VENTOLIN  (90 Base) MCG/ACT inhaler TWO SPRAYS BY MOUTH FOUR TIMES A DAY AS NEEDED  5    loratadine-pseudoephedrine (CLARITIN-D 12 HOUR) 5-120 MG per extended release tablet Take 1 tablet by mouth 2 times daily      baclofen (LIORESAL) 10 MG tablet Take 10 mg by mouth 3 times daily      cyclobenzaprine (FLEXERIL) 10 MG tablet        No current facility-administered medications for this encounter. Review of Systems   Constitutional: Positive for fatigue. HENT: Negative. Eyes: Negative. Respiratory: Negative. Cardiovascular: Negative. Neurological: Positive for headaches. Objective  Assessment & Plan  No diagnosis found. No orders of the defined types were placed in this encounter. No orders of the defined types were placed in this encounter.            Electronically signed by Artem Jaramillo MA on 6/16/2022 at 2:25 PM

## 2022-06-16 NOTE — PROGRESS NOTES
The patient is a 31 y. o. Non- / non  male. Chief Complaint   Patient presents with    Migraine    Medication Refill        Migraine   Pertinent negatives include no fever. 53-year-old man with history of chronic headaches  Currently not seeing any new neurologist  In past been evaluated by headache clinic in [facility]  Never tried Botox injection  On chronic opioid therapy for headaches in this clinic  Currently prescribed Nucynta and Butrans patch  Continue to have severe headaches  I concern for medication overuse headaches arm  Discussed with patient that I not comfortable managing chronic headaches I will refer him to neurology  Long-term chronic use of opioids is not associated with any benefit for chronic headaches  Discussed gradual weaning off opioids  He denies any side effects  Agreed to decrease Nucynta to twice a day  Consider discontinuation of Butrans in future    Medication Refill:    Butrans patch   Nucynta    Pain score Today:  7  Adverse effects (Constipation / Nausea / Sedation / sexual Dysfunction / others) :  Tired   Mood: fair  Sleep pattern and quality: fair  Activity level: poor    Pill count Today:  Butrans patch - 0  Nucynta - 32    Last dose taken 06/16/2022 afternoon  OARRS report reviewed today: yes  ER/Hospitalizations/PCP visit related to pain since last visit:no   Any legal problems e.g. DUI etc.:No  Satisfied with current management: Yes    Opioid Contract: 01/11/2022  Last Urine Dug screen dated: 05/17/2022    No results found for: LABA1C  No results found for: EAG    Past Medical History, Past Surgical History, Social History, Allergies and Medications reviewed and updated in EPIC as indicated    Family History reviewed and is noncontributory.         Past Medical History:   Diagnosis Date    Asthma     Depression     Headache(784.0)     Klinefelter syndrome         Past Surgical History:   Procedure Laterality Date    UPPP UVULOPALATOPHARYGOPLASTY Social History     Socioeconomic History    Marital status: Single     Spouse name: None    Number of children: None    Years of education: None    Highest education level: None   Occupational History    Occupation: disability   Tobacco Use    Smoking status: Never Smoker    Smokeless tobacco: Never Used   Vaping Use    Vaping Use: Never used   Substance and Sexual Activity    Alcohol use: Yes     Comment: rare , hard cider or wine    Drug use: No    Sexual activity: Never   Other Topics Concern    None   Social History Narrative    None     Social Determinants of Health     Financial Resource Strain:     Difficulty of Paying Living Expenses: Not on file   Food Insecurity:     Worried About Running Out of Food in the Last Year: Not on file    [place] of Food in the Last Year: Not on file   Transportation Needs:     Lack of Transportation (Medical): Not on file    Lack of Transportation (Non-Medical):  Not on file   Physical Activity:     Days of Exercise per Week: Not on file    Minutes of Exercise per Session: Not on file   Stress:     Feeling of Stress : Not on file   Social Connections:     Frequency of Communication with Friends and Family: Not on file    Frequency of Social Gatherings with Friends and Family: Not on file    Attends Restorationism Services: Not on file    Active Member of [address] [place] [address] or Organizations: Not on file    Attends Club or Organization Meetings: Not on file    Marital Status: Not on file   Intimate Partner Violence:     Fear of Current or Ex-Partner: Not on file    Emotionally Abused: Not on file    Physically Abused: Not on file    Sexually Abused: Not on file   Housing Stability:     Unable to Pay for Housing in the Last Year: Not on file    Number of [place] in the Last Year: Not on file    Unstable Housing in the Last Year: Not on file       Family History   Problem Relation Age of Onset    High Blood Pressure Father     Mental Illness Sister    [facility] High Blood Pressure Paternal Grandmother     Cancer Paternal Grandmother     Heart Disease Paternal Grandfather     High Blood Pressure Paternal Grandfather     Stroke Paternal Grandfather        Allergies   Allergen Reactions    Food      Bananas, eggs, milk, cherries    Clams   Upset stomach    Seasonal      Hay fever      Shellfish-Derived Products      Stomach issues       Vitals:    06/16/22 1432   BP: (!) 140/98   Pulse: 95   Resp: 18   Temp: 97.3 °F (36.3 °C)   SpO2: 96%       Current Outpatient Medications   Medication Sig Dispense Refill    tapentadol (NUCYNTA) 50 MG TABS Take 1 tablet by mouth 2 times daily as needed for Pain for up to 30 days. 60 tablet 0    BUTRANS 5 MCG/HR PTWK Place 1 patch onto the skin once a week for 28 days. 4 patch 1    B-D 3CC LUER-BRANDI SYR 57UQ8-1/2 22G X 1-1/2\" 3 ML MISC USE AS DIRECTED WITH TESTOSTERONE      divalproex (DEPAKOTE ER) 250 MG extended release tablet TAKE ONE TABLET BY MOUTH TWICE DAILY      testosterone cypionate (DEPOTESTOTERONE CYPIONATE) 200 MG/ML injection INJECT 1.25 MILLILITER INTRAMUSCULARLY EVERY 2 WEEKS      anastrozole (ARIMIDEX) 1 MG tablet Take 1 mg by mouth daily takes 1/2 tab      DULoxetine (CYMBALTA) 20 MG extended release capsule TAKE 1 CAPSULE BY MOUTH ONCE A DAY  2    nadolol (CORGARD) 40 MG tablet 1/4-1/2 AS DIRECTED FOR TREMOR  5    VENTOLIN  (90 Base) MCG/ACT inhaler TWO SPRAYS BY MOUTH FOUR TIMES A DAY AS NEEDED  5    loratadine-pseudoephedrine (CLARITIN-D 12 HOUR) 5-120 MG per extended release tablet Take 1 tablet by mouth 2 times daily      baclofen (LIORESAL) 10 MG tablet Take 10 mg by mouth 3 times daily      cyclobenzaprine (FLEXERIL) 10 MG tablet        No current facility-administered medications for this encounter. Review of Systems   Constitutional: Positive for fatigue. Negative for fever. HENT: Negative. Eyes: Negative. Respiratory: Negative. Cardiovascular: Negative.     Neurological: Positive for headaches. Objective:  General Appearance:  Uncomfortable, in pain, well-appearing and in no acute distress. Vital signs: (most recent): Blood pressure (!) 140/98, pulse 95, temperature 97.3 °F (36.3 °C), resp. rate 18, height 6' 3\" (1.905 m), weight (!) 310 lb (140.6 kg), SpO2 96 %. Vital signs are normal.  No fever. Output: Producing urine and producing stool. HEENT: Normal HEENT exam.    Lungs:  Normal effort and normal respiratory rate. He is not in respiratory distress. Heart: Normal rate. Extremities: Normal range of motion. There is no deformity. Neurological: Patient is alert and oriented to person, place and time. Patient has normal coordination. Pupils:  Pupils are equal, round, and reactive to light. Pupils are equal.   Skin:  Warm and dry. No rash or cyanosis. Assessment & Plan   37-year-old man with history of chronic headaches  Currently not seeing any new neurologist  In past been evaluated by headache clinic in [facility]  Never tried Botox injection  On chronic opioid therapy for headaches in this clinic  Currently prescribed Nucynta and Butrans patch  Continue to have severe headaches  I concern for medication overuse headaches arm  Discussed with patient that I not comfortable managing chronic headaches I will refer him to neurology  Long-term chronic use of opioids is not associated with any benefit for chronic headaches  Discussed gradual weaning off opioids  He denies any side effects  Agreed to decrease Nucynta to twice a day  Consider discontinuation of Butrans in future    1. Intractable persistent migraine aura without cerebral infarction and without status migrainosus    2. Encounter for long-term opiate analgesic use    3. Generalized headaches    4. Cervicalgia    5. Cervical spondylosis without myelopathy    6. Bilateral headaches    7.  Intractable migraine without status migrainosus, unspecified migraine type        Orders Placed This Encounter   Procedures   Mayra Pennington MD, Neurology, [place]      Orders Placed This Encounter   Medications    tapentadol (NUCYNTA) 50 MG TABS     Sig: Take 1 tablet by mouth 2 times daily as needed for Pain for up to 30 days. Dispense:  60 tablet     Refill:  0     Reduce doses taken as pain becomes manageable    BUTRANS 5 MCG/HR PTWK     Sig: Place 1 patch onto the skin once a week for 28 days. Dispense:  4 patch     Refill:  1     Fill on  2-11-22        Controlled Substance Monitoring:    Acute and Chronic Pain Monitoring:   RX Monitoring 6/16/2022   Attestation -   Acute Pain Prescriptions -   Periodic Controlled Substance Monitoring Possible medication side effects, risk of tolerance/dependence & alternative treatments discussed. Chronic Pain > 50 MEDD Obtained or confirmed \"Consent for Opioid Use\" on file.    Chronic Pain > 80 MEDD -               Electronically signed by Daya Coffman MD on 6/16/2022 at 2:51 PM

## 2022-06-22 DIAGNOSIS — M47.812 CERVICAL SPONDYLOSIS WITHOUT MYELOPATHY: ICD-10-CM

## 2022-06-22 DIAGNOSIS — M54.2 CERVICALGIA: ICD-10-CM

## 2022-06-22 DIAGNOSIS — R51.9 BILATERAL HEADACHES: ICD-10-CM

## 2022-06-22 DIAGNOSIS — R51.9 GENERALIZED HEADACHES: ICD-10-CM

## 2022-06-23 RX ORDER — BUPRENORPHINE 5 UG/H
PATCH, EXTENDED RELEASE TRANSDERMAL
Qty: 4 PATCH | OUTPATIENT
Start: 2022-06-23

## 2022-10-24 NOTE — PROGRESS NOTES
Chief Complaint   Patient presents with    Migraine    Medication Refill         PMH     He c/o headache left side which is unchanged. He is on Butrans and nucynta which help. He has long standing history of Migraines. He was seen at [facility] in [facility]. He tried Aimovig for two months with no benefit. He has tried accupressure, chiropractor and yoga for pain relief. He is opioid dependant for pain control and takes Nucynta and butrans patch . He says he has HA daily that will last most of the day, pain scale ranging from 5-10. No specific triggers identified. Does wear sunglasses in bright lights. He takes nucynta BID and uses butrans patch on for 2 weeks and off for 2 weeks to help control the pain.      Despite of significant amount of opioid use patient continues to complain severe chronic pain issues. I have explained MME to the patient and that the CDC recommends avoiding MME over 90 with goal to be less than 50. Explained to patient that there is a higher risk for hyperalgesia, respiratory depression and accidental overdose with chronic use of high dose opioids. Assured patient we will work with them to slowly titrate to a lower dose while at the same time offering non opioid options and interventions when applicable. Pt has next month's appt with MD and can discuss options    HPI:     Migraine   This is a chronic problem. The current episode started more than 1 year ago. The problem occurs constantly. The problem has been unchanged. The pain does not radiate. The quality of the pain is described as stabbing. The pain is at a severity of 5/10. Pertinent negatives include no blurred vision, dizziness, nausea, neck pain, numbness, tingling, vomiting or weakness. The symptoms are aggravated by bright light and noise. He has tried Excedrin, acetaminophen and oral narcotics for the symptoms. Patient denies any new neurological symptoms.  No bowel or bladder incontinence, no weakness, and no falling. Pill count: appropriate  Nucynta - 8 5/21  Butrans - 2 5/20 fills every other month     Morphine equivalent: 60     Controlled Substance Monitoring:    Acute and Chronic Pain Monitoring:   RX Monitoring 5/17/2022   Attestation -   Acute Pain Prescriptions -   Periodic Controlled Substance Monitoring Possible medication side effects, risk of tolerance/dependence & alternative treatments discussed. ;No signs of potential drug abuse or diversion identified. ;Assessed functional status. ;Obtaining appropriate analgesic effect of treatment. Chronic Pain > 50 MEDD -   Chronic Pain > 80 MEDD -         Periodic Controlled Substance Monitoring: Possible medication side effects, risk of tolerance/dependence & alternative treatments discussed. ,No signs of potential drug abuse or diversion identified. ,Assessed functional status. ,Obtaining appropriate analgesic effect of treatment. Austen Cm APRN - CNP)      Past Medical History:   Diagnosis Date    Asthma     Depression     Headache(784.0)     Klinefelter syndrome        Past Surgical History:   Procedure Laterality Date    UPPP UVULOPALATOPHARYGOPLASTY         Allergies   Allergen Reactions    Food      Bananas, eggs, milk, cherries    Clams   Upset stomach    Seasonal      Hay fever      Shellfish-Derived Products      Stomach issues         Current Outpatient Medications:     tapentadol (NUCYNTA) 50 MG TABS, Take 1 tablet by mouth every 8 hours for 30 days. , Disp: 90 tablet, Rfl: 0    BUTRANS 5 MCG/HR PTWK, Place 1 patch onto the skin once a week for 28 days. , Disp: 4 patch, Rfl: 0    B-D 3CC LUER-BRANDI SYR 62UF9-7/2 22G X 1-1/2\" 3 ML MISC, USE AS DIRECTED WITH TESTOSTERONE, Disp: , Rfl:     divalproex (DEPAKOTE ER) 250 MG extended release tablet, TAKE ONE TABLET BY MOUTH TWICE DAILY, Disp: , Rfl:     testosterone cypionate (DEPOTESTOTERONE CYPIONATE) 200 MG/ML injection, INJECT 1.25 MILLILITER INTRAMUSCULARLY EVERY 2 WEEKS, Disp: , Rfl:     anastrozole (ARIMIDEX) 1 MG tablet, Take 1 mg by mouth daily takes 1/2 tab, Disp: , Rfl:     DULoxetine (CYMBALTA) 20 MG extended release capsule, TAKE 1 CAPSULE BY MOUTH ONCE A DAY, Disp: , Rfl: 2    nadolol (CORGARD) 40 MG tablet, 1/4-1/2 AS DIRECTED FOR TREMOR, Disp: , Rfl: 5    VENTOLIN  (90 Base) MCG/ACT inhaler, TWO SPRAYS BY MOUTH FOUR TIMES A DAY AS NEEDED, Disp: , Rfl: 5    loratadine-pseudoephedrine (CLARITIN-D 12 HOUR) 5-120 MG per extended release tablet, Take 1 tablet by mouth 2 times daily, Disp: , Rfl:     baclofen (LIORESAL) 10 MG tablet, Take 10 mg by mouth 3 times daily, Disp: , Rfl:     cyclobenzaprine (FLEXERIL) 10 MG tablet, , Disp: , Rfl:     Family History   Problem Relation Age of Onset    High Blood Pressure Father     Mental Illness Sister     High Blood Pressure Paternal Grandmother     Cancer Paternal Grandmother     Heart Disease Paternal Grandfather     High Blood Pressure Paternal Grandfather     Stroke Paternal Grandfather        Social History     Socioeconomic History    Marital status: Single     Spouse name: Not on file    Number of children: Not on file    Years of education: Not on file    Highest education level: Not on file   Occupational History    Occupation: disability   Tobacco Use    Smoking status: Never Smoker    Smokeless tobacco: Never Used   Vaping Use    Vaping Use: Never used   Substance and Sexual Activity    Alcohol use: Yes     Comment: rare , hard cider or wine    Drug use: No    Sexual activity: Never   Other Topics Concern    Not on file   Social History Narrative    Not on file     Social Determinants of Health     Financial Resource Strain:     Difficulty of Paying Living Expenses: Not on file   Food Insecurity:     Worried About Running Out of Food in the Last Year: Not on file    Lencho of Food in the Last Year: Not on file   Transportation Needs:     Lack of Transportation (Medical):  Not on file    Lack of Transportation (Non-Medical): Not on file   Physical Activity:     Days of Exercise per Week: Not on file    Minutes of Exercise per Session: Not on file   Stress:     Feeling of Stress : Not on file   Social Connections:     Frequency of Communication with Friends and Family: Not on file    Frequency of Social Gatherings with Friends and Family: Not on file    Attends Latter-day Services: Not on file    Active Member of [address] [place] [address] or Organizations: Not on file    Attends Club or Organization Meetings: Not on file    Marital Status: Not on file   Intimate Partner Violence:     Fear of Current or Ex-Partner: Not on file    Emotionally Abused: Not on file    Physically Abused: Not on file    Sexually Abused: Not on file   Housing Stability:     Unable to Pay for Housing in the Last Year: Not on file    Number of [place] in the Last Year: Not on file    Unstable Housing in the Last Year: Not on file       Review of Systems:  Review of Systems   Eyes: Negative for blurred vision. Musculoskeletal: Negative for neck pain. Gastrointestinal: Negative for nausea and vomiting. Neurological: Negative for dizziness, numbness, tingling and weakness. Physical Exam:  BP (!) 148/90   Pulse 78   Temp 97.3 °F (36.3 °C)   Resp 18   Ht 6' 3\" (1.905 m)   Wt (!) 310 lb (140.6 kg)   SpO2 95%   BMI 38.75 kg/m²     Physical Exam    Record/Diagnostics Review:    Last cipriano 6/2021  and was appropriate     Assessment:  Problem List Items Addressed This Visit     Intractable migraine without status migrainosus - Primary    Encounter for long-term opiate analgesic use             Treatment Plan:  Patient relates current medications are helping the pain. Patient reports taking pain medications as prescribed, denies obtaining medications from different sources and denies use of illegal drugs. Patient denies side effects from medications like nausea, vomiting, constipation or drowsiness.  Patient reports current activities of daily living are possible due to medications and would like to continue them. As always, we encourage daily stretching and strengthening exercises, and recommend minimizing use of pain medications unless patient cannot get through daily activities due to pain. Contract requirements met. Continue opioid therapy. Script written for nucynta and butrans  YANIRA obtained today for monitoring purposes. Last dose of medication was today for nucynta and 5/9 for butrans  Follow up appointment made for 4 weeks    I have reviewed the chief complaint and history of present illness (including ROS and [facility]) and vital documentation by my staff and I agree with their documentation and have added where applicable. There are no Wet Read(s) to document.